# Patient Record
Sex: FEMALE | Race: BLACK OR AFRICAN AMERICAN | NOT HISPANIC OR LATINO | Employment: FULL TIME | URBAN - METROPOLITAN AREA
[De-identification: names, ages, dates, MRNs, and addresses within clinical notes are randomized per-mention and may not be internally consistent; named-entity substitution may affect disease eponyms.]

---

## 2019-01-03 ENCOUNTER — HOSPITAL ENCOUNTER (OUTPATIENT)
Dept: RADIOLOGY | Facility: HOSPITAL | Age: 49
Discharge: HOME | End: 2019-01-03
Attending: NURSE PRACTITIONER
Payer: COMMERCIAL

## 2019-01-03 ENCOUNTER — OFFICE VISIT (OUTPATIENT)
Dept: URGENT CARE | Facility: CLINIC | Age: 49
End: 2019-01-03
Payer: COMMERCIAL

## 2019-01-03 VITALS
WEIGHT: 174.81 LBS | SYSTOLIC BLOOD PRESSURE: 114 MMHG | DIASTOLIC BLOOD PRESSURE: 84 MMHG | TEMPERATURE: 97 F | OXYGEN SATURATION: 100 % | HEART RATE: 64 BPM

## 2019-01-03 DIAGNOSIS — R10.84 GENERALIZED ABDOMINAL PAIN: ICD-10-CM

## 2019-01-03 DIAGNOSIS — R10.9 ABDOMINAL PAIN, UNSPECIFIED ABDOMINAL LOCATION: ICD-10-CM

## 2019-01-03 DIAGNOSIS — R10.9 ABDOMINAL PAIN, UNSPECIFIED ABDOMINAL LOCATION: Primary | ICD-10-CM

## 2019-01-03 LAB
BILIRUB SERPL-MCNC: NEGATIVE MG/DL
BLOOD URINE, POC: NEGATIVE
COLOR, POC UA: ABNORMAL
GLUCOSE UR QL STRIP: NORMAL
KETONES UR QL STRIP: NEGATIVE
LEUKOCYTE ESTERASE URINE, POC: NEGATIVE
NITRITE, POC UA: NEGATIVE
PH, POC UA: 7
PROTEIN, POC: ABNORMAL
SPECIFIC GRAVITY, POC UA: 1
UROBILINOGEN, POC UA: NORMAL

## 2019-01-03 PROCEDURE — 99999 PR PBB SHADOW E&M-NEW PATIENT-LVL V: ICD-10-PCS | Mod: PBBFAC,,, | Performed by: NURSE PRACTITIONER

## 2019-01-03 PROCEDURE — 74019 RADEX ABDOMEN 2 VIEWS: CPT | Mod: TC,FY,PO

## 2019-01-03 PROCEDURE — 81001 POCT URINALYSIS, DIPSTICK OR TABLET REAGENT, AUTOMATED, WITH MICROSCOP: ICD-10-PCS | Mod: S$GLB,,, | Performed by: NURSE PRACTITIONER

## 2019-01-03 PROCEDURE — 74019 RADEX ABDOMEN 2 VIEWS: CPT | Mod: 26,,, | Performed by: RADIOLOGY

## 2019-01-03 PROCEDURE — 99204 OFFICE O/P NEW MOD 45 MIN: CPT | Mod: 25,S$GLB,, | Performed by: NURSE PRACTITIONER

## 2019-01-03 PROCEDURE — 81001 URINALYSIS AUTO W/SCOPE: CPT | Mod: S$GLB,,, | Performed by: NURSE PRACTITIONER

## 2019-01-03 PROCEDURE — 99999 PR PBB SHADOW E&M-NEW PATIENT-LVL V: CPT | Mod: PBBFAC,,, | Performed by: NURSE PRACTITIONER

## 2019-01-03 PROCEDURE — 87086 URINE CULTURE/COLONY COUNT: CPT

## 2019-01-03 PROCEDURE — 74019 XR ABDOMEN FLAT AND ERECT: ICD-10-PCS | Mod: 26,,, | Performed by: RADIOLOGY

## 2019-01-03 PROCEDURE — 99204 PR OFFICE/OUTPT VISIT, NEW, LEVL IV, 45-59 MIN: ICD-10-PCS | Mod: 25,S$GLB,, | Performed by: NURSE PRACTITIONER

## 2019-01-03 RX ORDER — OXYCODONE AND ACETAMINOPHEN 7.5; 325 MG/1; MG/1
TABLET ORAL
COMMUNITY
Start: 2015-05-09 | End: 2023-07-13 | Stop reason: SDUPTHER

## 2019-01-03 RX ORDER — TRIAMCINOLONE ACETONIDE 1 MG/G
OINTMENT TOPICAL
COMMUNITY
Start: 2017-06-09

## 2019-01-03 RX ORDER — KETOCONAZOLE 20 MG/G
CREAM TOPICAL
COMMUNITY
Start: 2017-06-09

## 2019-01-03 RX ORDER — FLUTICASONE PROPIONATE 50 MCG
SPRAY, SUSPENSION (ML) NASAL
COMMUNITY

## 2019-01-03 RX ORDER — ASPIRIN 81 MG/1
81 TABLET ORAL
COMMUNITY

## 2019-01-03 RX ORDER — PROPRANOLOL HYDROCHLORIDE 20 MG/1
TABLET ORAL
COMMUNITY

## 2019-01-03 RX ORDER — CYCLOBENZAPRINE HCL 10 MG
TABLET ORAL
COMMUNITY
Start: 2015-04-16 | End: 2023-07-13 | Stop reason: SDUPTHER

## 2019-01-03 NOTE — PROGRESS NOTES
Subjective:      Patient ID: Altagracia Islas is a 48 y.o. female.    Chief Complaint: Flank Pain      Flank Pain   This is a new problem. The current episode started 1 to 4 weeks ago. The problem occurs constantly. The problem has been gradually worsening since onset. The quality of the pain is described as aching (dull consistent). The pain does not radiate. The pain is at a severity of 7/10. The pain is the same all the time. The symptoms are aggravated by bending, sitting and urinating (bowel movements). Associated symptoms include abdominal pain (LLQ). Pertinent negatives include no bladder incontinence, bowel incontinence, chest pain, dysuria, fever, headaches, leg pain, paresis, paresthesias, pelvic pain, perianal numbness, tingling, weakness or weight loss. Risk factors include obesity. She has tried NSAIDs for the symptoms. The treatment provided mild relief.     Review of Systems   Constitutional: Negative.  Negative for activity change, appetite change, chills, diaphoresis, fatigue, fever and weight loss.   HENT: Negative.    Eyes: Negative.    Respiratory: Negative.  Negative for chest tightness and shortness of breath.    Cardiovascular: Negative.  Negative for chest pain and palpitations.   Gastrointestinal: Positive for abdominal pain (LLQ). Negative for abdominal distention, anal bleeding, blood in stool, bowel incontinence, constipation, diarrhea, nausea, rectal pain and vomiting.   Endocrine: Negative.  Negative for cold intolerance and heat intolerance.   Genitourinary: Negative for bladder incontinence, difficulty urinating, dyspareunia, dysuria, flank pain and pelvic pain.   Musculoskeletal: Negative for myalgias.   Skin: Negative for rash.   Allergic/Immunologic: Negative.  Negative for environmental allergies.   Neurological: Negative.  Negative for dizziness, tingling, weakness, light-headedness, headaches and paresthesias.   Hematological: Negative.  Negative for adenopathy.    Psychiatric/Behavioral: Negative.  Negative for agitation.        Objective:     Vitals:    01/03/19 1257   BP: 114/84   Pulse: 64   Temp: 96.9 °F (36.1 °C)     Physical Exam   Constitutional: She is oriented to person, place, and time. Vital signs are normal. She appears well-developed and well-nourished. She is cooperative. She does not appear ill. No distress.   HENT:   Head: Normocephalic.   Right Ear: Hearing and external ear normal.   Left Ear: Hearing and external ear normal.   Nose: Nose normal.   Eyes: Conjunctivae and EOM are normal. Pupils are equal, round, and reactive to light. Right eye exhibits no discharge. Left eye exhibits no discharge.   Neck: Normal range of motion and full passive range of motion without pain. Neck supple.   Cardiovascular: Normal rate, regular rhythm, S1 normal, S2 normal and normal heart sounds.   Pulmonary/Chest: Effort normal and breath sounds normal. No accessory muscle usage. No tachypnea and no bradypnea. No respiratory distress. She exhibits no tenderness.   Abdominal: Soft. Normal appearance and bowel sounds are normal. She exhibits no shifting dullness, no distension, no pulsatile liver, no fluid wave, no abdominal bruit, no ascites, no pulsatile midline mass and no mass. There is no hepatosplenomegaly. There is tenderness in the left lower quadrant. There is no rigidity, no rebound, no guarding, no CVA tenderness, no tenderness at McBurney's point and negative Pantoja's sign.   Genitourinary:   Genitourinary Comments: Abnormal findings In House Urinalysis: Trace Protein   Musculoskeletal: Normal range of motion.   Neurological: She is alert and oriented to person, place, and time.   Skin: Skin is warm, dry and intact. Capillary refill takes less than 2 seconds. No bruising, no ecchymosis, no lesion, no petechiae and no rash noted. No erythema.   Nursing note and vitals reviewed.      Assessment:     1. Abdominal pain, unspecified abdominal location    2. Generalized  abdominal pain        Plan:       Altagracia was seen today for flank pain.    Diagnoses and all orders for this visit:    Abdominal pain, unspecified abdominal location  -     POCT URINE DIPSTICK WITH MICROSCOPE, AUTOMATED  -     X-Ray Abdomen Flat And Erect; Future  -     Urine culture  -     CBC auto differential; Future  -     Sedimentation rate; Future  -     C-reactive protein; Future  -     X-Ray Abdomen Flat And Erect; Future    Generalized abdominal pain  -     CT Abdomen Without Contrast; Future  -     Urine culture  -     CBC auto differential; Future  -     Sedimentation rate; Future  -     C-reactive protein; Future  -     X-Ray Abdomen Flat And Erect; Future      If your condition worsens or fails to improve we recommend that you receive another evaluation at the ER immediately or contact your PCP to discuss your concerns or return here. You must understand that you've received an urgent care treatment only and that you may be released before all your medical problems are known or treated. You the patient will arrange for followup care as instructed.   01/10/19-Spoke with patient by phone regarding CT results, noting 3.8 cm enlargement of left ovary per radiology report, who reports understanding and reported will follow up with GYN Dr. CATARINA Abernathy, APRN, FNP-C

## 2019-01-03 NOTE — PATIENT INSTRUCTIONS
Abdominal Pain    Abdominal pain is pain in the stomach or belly area. Everyone has this pain from time to time. In many cases it goes away on its own. But abdominal pain can sometimes be due to a serious problem, such as appendicitis. So its important to know when to seek help.  Causes of abdominal pain  There are many possible causes of abdominal pain. Common causes in adults include:  · Constipation, diarrhea, or gas  · Stomach acid flowing back up into the esophagus (acid reflux or heartburn)  · Severe acid reflux, called GERD (gastroesophageal reflux disease)  · A sore in the lining of the stomach or small intestine (peptic ulcer)  · Inflammation of the gallbladder, liver, or pancreas  · Gallstones or kidney stones  · Appendicitis   · Intestinal blockage   · An internal organ pushing through a muscle or other tissue (hernia)  · Urinary tract infections  · In women, menstrual cramps, fibroids, or endometriosis  · Inflammation or infection of the intestines  Diagnosing the cause of abdominal pain  Your healthcare provider will do a physical exam help find the cause of your pain. If needed, tests will be ordered. Belly pain has many possible causes. So it can be hard to find the reason for your pain. Giving details about your pain can help. Tell your provider where and when you feel the pain, and what makes it better or worse. Also let your provider know if you have other symptoms such as:  · Fever  · Tiredness  · Upset stomach (nausea)  · Vomiting  · Changes in bathroom habits  Treating abdominal pain  Some causes of pain need emergency medical treatment right away. These include appendicitis or a bowel blockage. Other problems can be treated with rest, fluids, or medicines. Your healthcare provider can give you specific instructions for treatment or self-care based on what is causing your pain.  If you have vomiting or diarrhea, sip water or other clear fluids. When you are ready to eat solid foods again,  start with small amounts of easy-to-digest, low-fat foods. These include apple sauce, toast, or crackers.   When to seek medical care  Call 911 or go to the hospital right away if you:  · Cant pass stool and are vomiting  · Are vomiting blood or have bloody diarrhea or black, tarry diarrhea  · Have chest, neck, or shoulder pain  · Feel like you might pass out  · Have pain in your shoulder blades with nausea  · Have sudden, severe belly pain  · Have new, severe pain unlike any you have felt before  · Have a belly that is rigid, hard, and tender to touch  Call your healthcare provider if you have:  · Pain for more than 5 days  · Bloating for more than 2 days  · Diarrhea for more than 5 days  · A fever of 100.4°F (38.0°C) or higher, or as directed by your provider  · Pain that gets worse  · Weight loss for no reason  · Continued lack of appetite  · Blood in your stool  How to prevent abdominal pain  Here are some tips to help prevent abdominal pain:  · Eat smaller amounts of food at one time.  · Avoid greasy, fried, or other high-fat foods.  · Avoid foods that give you gas.  · Exercise regularly.  · Drink plenty of fluids.  To help prevent GERD symptoms:  · Quit smoking.  · Reduce alcohol and certain foods that increase stomach acid.  · Avoid aspirin and over-the-counter pain and fever medicines (NSAIDS or nonsteroidal anti-inflammatory drugs), if possible  · Lose extra weight.  · Finish eating at least 2 hours before you go to bed or lie down.  · Raise the head of your bed.  Date Last Reviewed: 7/1/2016  © 8714-0330 [employer]. [address] [place] [address], [place] [address]. All rights reserved. This information is not intended as a substitute for professional medical care. Always follow your healthcare professional's instructions.        Unknown Causes of Abdominal Pain (Female)    The exact cause of your abdominal (stomach) pain is not clear. This does not mean that this is something to worry about.  Everyone likes to know the exact cause of the problem, but sometimes with abdominal pain, there is no clear-cut cause, and this could be a good thing. The good news is that your symptoms can be treated, and you will feel better.   Your condition does not seem serious now; however, sometimes the signs of a serious problem may take more time to appear. For this reason, it is important for you to watch for any new symptoms, problems, or worsening of your condition.  Over the next few days, the abdominal pain may come and go, or be continuous. Other common symptoms can include nausea and vomiting. Sometimes it can be difficult to tell if you feel nauseous, you may just feel bad and not associate that feeling with nausea. Constipation, diarrhea, and a fever may go along with the pain.  The pain may continue even if treated correctly over the following days. Depending on how things go, sometimes the cause can become clear and may require further or different treatment. Additional evaluations, medications, or tests may also be needed.  Home care  Your healthcare provider may prescribe medicine for pain, symptoms, or an infection.  Follow the healthcare provider's instructions for taking these medicines.  General care  · Rest as much as you can until your next exam. No strenuous activities.  · Try to find positions that ease discomfort. A small pillow placed on the abdomen may help relieve pain.  · Something warm on your abdomen (such as a heating pad) may help, but be careful not to burn yourself.  Diet  · Do not force yourself to eat, especially if having cramps, vomiting, or diarrhea.  · Water is important so you do not get dehydrated. Soup may also be good. Sports drinks may also help, especially if they are not too acidic. Make sure you don't drink sugary drinks as this can make things worse. Take liquids in small amounts. Do not guzzle them.  · Caffeine sometimes makes the pain and cramping worse.  · Avoid dairy  products if you have vomiting or diarrhea.  · Don't eat large amounts at a time. Wait a few minutes between bites.  · Eat a diet low in fiber (called a low-residue diet). Foods allowed include refined breads, white rice, fruit and vegetable juices without pulp, tender meats. These foods will pass more easily through the intestine.  · Avoid whole-grain foods, whole fruits and vegetables, meats, seeds and nuts, fried or fatty foods, dairy, alcohol and spicy foods until your symptoms go away.  Follow-up care  Follow up with your healthcare provider, or as advised, if your pain does not begin to improve in the next 24 hours.  Call 911  Call 911 if any of these occur:  · Trouble breathing  · Confusion  · Fainting or loss of consciousness  · Rapid heart rate  · Seizure  When to seek medical advice  Call your healthcare provider right away if any of these occur:  · Pain gets worse or moves to the right lower abdomen  · New or worsening vomiting or diarrhea  · Swelling of the abdomen  · Unable to pass stool for more than 3 days  · Fever of 100.4ºF (38ºC) or higher, or as directed by your healthcare provider.  · Blood in vomit or bowel movements (dark red or black color)  · Jaundice (yellow color of eyes and skin)  · Weakness, dizziness  · Chest, arm, back, neck or jaw pain  · Unexpected vaginal bleeding or missed period  · Can't keep down liquids or water and are getting dehydrated  Date Last Reviewed: 12/30/2015  © 6417-8277 [employer]. [address] [place] [address], [place] [address]. All rights reserved. This information is not intended as a substitute for professional medical care. Always follow your healthcare professional's instructions.

## 2019-01-05 LAB — BACTERIA UR CULT: NORMAL

## 2019-01-06 ENCOUNTER — TELEPHONE (OUTPATIENT)
Dept: URGENT CARE | Facility: CLINIC | Age: 49
End: 2019-01-06

## 2019-01-06 NOTE — TELEPHONE ENCOUNTER
Spoke with client by phone after 2 patient identifiers, regarding abdominal x-ray results and labs from previous visit, who reports understanding.  Also reports feeling better and has appointment on Tuesday at 11:10am at [facility] for more Radiology test.  Advised to follow up with PCP for further evaluation regarding abnormal lab/x-ray results, who reports understanding

## 2019-01-07 ENCOUNTER — TELEPHONE (OUTPATIENT)
Dept: RADIOLOGY | Facility: HOSPITAL | Age: 49
End: 2019-01-07

## 2019-01-08 ENCOUNTER — HOSPITAL ENCOUNTER (OUTPATIENT)
Dept: RADIOLOGY | Facility: HOSPITAL | Age: 49
Discharge: HOME | End: 2019-01-08
Attending: NURSE PRACTITIONER
Payer: COMMERCIAL

## 2019-01-08 DIAGNOSIS — R10.84 GENERALIZED ABDOMINAL PAIN: ICD-10-CM

## 2019-01-08 PROCEDURE — 25500020 PHARM REV CODE 255: Mod: PO | Performed by: NURSE PRACTITIONER

## 2019-01-08 PROCEDURE — 74177 CT ABDOMEN PELVIS WITH CONTRAST: ICD-10-PCS | Mod: 26,,, | Performed by: RADIOLOGY

## 2019-01-08 PROCEDURE — 74177 CT ABD & PELVIS W/CONTRAST: CPT | Mod: TC,PO

## 2019-01-08 PROCEDURE — 74177 CT ABD & PELVIS W/CONTRAST: CPT | Mod: 26,,, | Performed by: RADIOLOGY

## 2019-01-08 RX ADMIN — IOHEXOL 75 ML: 350 INJECTION, SOLUTION INTRAVENOUS at 12:01

## 2019-01-08 RX ADMIN — IOHEXOL 16 ML: 350 INJECTION, SOLUTION INTRAVENOUS at 10:01

## 2019-12-13 ENCOUNTER — OFFICE VISIT (OUTPATIENT)
Dept: URGENT CARE | Facility: CLINIC | Age: 49
End: 2019-12-13
Payer: COMMERCIAL

## 2019-12-13 VITALS
HEART RATE: 76 BPM | SYSTOLIC BLOOD PRESSURE: 153 MMHG | DIASTOLIC BLOOD PRESSURE: 81 MMHG | TEMPERATURE: 98 F | OXYGEN SATURATION: 99 %

## 2019-12-13 DIAGNOSIS — J32.9 BACTERIAL SINUSITIS: Primary | ICD-10-CM

## 2019-12-13 DIAGNOSIS — R05.9 COUGH: ICD-10-CM

## 2019-12-13 DIAGNOSIS — B96.89 BACTERIAL SINUSITIS: Primary | ICD-10-CM

## 2019-12-13 LAB
CTP QC/QA: YES
FLUAV AG NPH QL: NEGATIVE
FLUBV AG NPH QL: NEGATIVE

## 2019-12-13 PROCEDURE — 96372 THER/PROPH/DIAG INJ SC/IM: CPT | Mod: S$GLB,,, | Performed by: NURSE PRACTITIONER

## 2019-12-13 PROCEDURE — 87804 INFLUENZA ASSAY W/OPTIC: CPT | Mod: QW,S$GLB,, | Performed by: NURSE PRACTITIONER

## 2019-12-13 PROCEDURE — 87804 POCT INFLUENZA A/B: ICD-10-PCS | Mod: QW,S$GLB,, | Performed by: NURSE PRACTITIONER

## 2019-12-13 PROCEDURE — 99214 OFFICE O/P EST MOD 30 MIN: CPT | Mod: 25,S$GLB,, | Performed by: NURSE PRACTITIONER

## 2019-12-13 PROCEDURE — 96372 PR INJECTION,THERAP/PROPH/DIAG2ST, IM OR SUBCUT: ICD-10-PCS | Mod: S$GLB,,, | Performed by: NURSE PRACTITIONER

## 2019-12-13 PROCEDURE — 99214 PR OFFICE/OUTPT VISIT, EST, LEVL IV, 30-39 MIN: ICD-10-PCS | Mod: 25,S$GLB,, | Performed by: NURSE PRACTITIONER

## 2019-12-13 RX ORDER — BETAMETHASONE SODIUM PHOSPHATE AND BETAMETHASONE ACETATE 3; 3 MG/ML; MG/ML
6 INJECTION, SUSPENSION INTRA-ARTICULAR; INTRALESIONAL; INTRAMUSCULAR; SOFT TISSUE
Status: COMPLETED | OUTPATIENT
Start: 2019-12-13 | End: 2019-12-13

## 2019-12-13 RX ORDER — LORATADINE 10 MG/1
10 TABLET ORAL DAILY
Qty: 30 TABLET | Refills: 0 | Status: SHIPPED | OUTPATIENT
Start: 2019-12-13 | End: 2020-09-04

## 2019-12-13 RX ORDER — AZITHROMYCIN 250 MG/1
TABLET, FILM COATED ORAL
Qty: 6 TABLET | Refills: 0 | Status: SHIPPED | OUTPATIENT
Start: 2019-12-13 | End: 2019-12-18

## 2019-12-13 RX ORDER — FLUTICASONE PROPIONATE 50 MCG
1 SPRAY, SUSPENSION (ML) NASAL DAILY
Qty: 9.9 ML | Refills: 0 | Status: SHIPPED | OUTPATIENT
Start: 2019-12-13 | End: 2019-12-23

## 2019-12-13 RX ORDER — GUAIFENESIN 600 MG/1
600 TABLET, EXTENDED RELEASE ORAL 2 TIMES DAILY PRN
Qty: 14 TABLET | Refills: 0 | Status: SHIPPED | OUTPATIENT
Start: 2019-12-13 | End: 2019-12-20

## 2019-12-13 RX ADMIN — BETAMETHASONE SODIUM PHOSPHATE AND BETAMETHASONE ACETATE 6 MG: 3; 3 INJECTION, SUSPENSION INTRA-ARTICULAR; INTRALESIONAL; INTRAMUSCULAR; SOFT TISSUE at 03:12

## 2019-12-13 NOTE — PATIENT INSTRUCTIONS
Sinusitis (Antibiotic Treatment)    The sinuses are air-filled spaces within the bones of the face. They connect to the inside of the nose. Sinusitis is an inflammation of the tissue lining the sinus cavity. Sinus inflammation can occur during a cold. It can also be due to allergies to pollens and other particles in the air. Sinusitis can cause symptoms of sinus congestion and fullness. A sinus infection causes fever, headache and facial pain. There is often green or yellow drainage from the nose or into the back of the throat (post-nasal drip). You have been given antibiotics to treat this condition.  Home care:  · Take the full course of antibiotics as instructed. Do not stop taking them, even if you feel better.  · Drink plenty of water, hot tea, and other liquids. This may help thin mucus. It also may promote sinus drainage.  · Heat may help soothe painful areas of the face. Use a towel soaked in hot water. Or,  the shower and direct the hot spray onto your face. Using a vaporizer along with a menthol rub at night may also help.   · An expectorant containing guaifenesin may help thin the mucus and promote drainage from the sinuses.  · Over-the-counter decongestants may be used unless a similar medicine was prescribed. Nasal sprays work the fastest. Use one that contains phenylephrine or oxymetazoline. First blow the nose gently. Then use the spray. Do not use these medicines more often than directed on the label or symptoms may get worse. You may also use tablets containing pseudoephedrine. Avoid products that combine ingredients, because side effects may be increased. Read labels. You can also ask the pharmacist for help. (NOTE: Persons with high blood pressure should not use decongestants. They can raise blood pressure.)  · Over-the-counter antihistamines may help if allergies contributed to your sinusitis.    · Do not use nasal rinses or irrigation during an acute sinus infection, unless told to by  your health care provider. Rinsing may spread the infection to other sinuses.  · Use acetaminophen or ibuprofen to control pain, unless another pain medicine was prescribed. (If you have chronic liver or kidney disease or ever had a stomach ulcer, talk with your doctor before using these medicines. Aspirin should never be used in anyone under 18 years of age who is ill with a fever. It may cause severe liver damage.)  · Don't smoke. This can worsen symptoms.  Follow-up care  Follow up with your healthcare provider or our staff if you are not improving within the next week.  When to seek medical advice  Call your healthcare provider if any of these occur:  · Facial pain or headache becoming more severe  · Stiff neck  · Unusual drowsiness or confusion  · Swelling of the forehead or eyelids  · Vision problems, including blurred or double vision  · Fever of 100.4ºF (38ºC) or higher, or as directed by your healthcare provider  · Seizure  · Breathing problems  · Symptoms not resolving within 10 days  Date Last Reviewed: 4/13/2015  © 5327-0605 [employer]. [address] [place] [address], [place] [address]. All rights reserved. This information is not intended as a substitute for professional medical care. Always follow your healthcare professional's instructions.

## 2019-12-13 NOTE — PROGRESS NOTES
Subjective:       Patient ID: Altagracia Islas is a 49 y.o. female.    Vitals:  oral temperature is 98.1 °F (36.7 °C). Her blood pressure is 153/81 (abnormal) and her pulse is 76. Her oxygen saturation is 99%.     Chief Complaint: URI    URI x2 days// DX of strep last week   Was treated for that/// No OTC meds attempted    URI    This is a new problem. The current episode started yesterday. The problem has been gradually worsening. There has been no fever. Associated symptoms include congestion, coughing and sinus pain. Pertinent negatives include no ear pain, nausea, rash, vomiting or wheezing. The treatment provided no relief.       Constitution: Positive for chills, sweating, fatigue and fever.   HENT: Positive for congestion, sinus pain and sinus pressure. Negative for ear pain and voice change.    Neck: Negative for painful lymph nodes.   Eyes: Negative for eye redness.   Respiratory: Positive for cough. Negative for chest tightness, sputum production, bloody sputum, COPD, shortness of breath, stridor, wheezing and asthma.    Gastrointestinal: Negative for nausea and vomiting.   Musculoskeletal: Positive for muscle ache.   Skin: Negative for rash.   Allergic/Immunologic: Negative for seasonal allergies and asthma.   Hematologic/Lymphatic: Negative for swollen lymph nodes.       Objective:      Physical Exam   Constitutional: She is oriented to person, place, and time. She appears well-developed and well-nourished. She is cooperative.  Non-toxic appearance. She does not have a sickly appearance. She does not appear ill. No distress.   HENT:   Head: Normocephalic and atraumatic.   Right Ear: Hearing, external ear and ear canal normal. A middle ear effusion is present.   Left Ear: Hearing, external ear and ear canal normal. A middle ear effusion is present.   Nose: Mucosal edema, rhinorrhea and purulent discharge present. No nasal deformity. No epistaxis. Right sinus exhibits maxillary sinus tenderness. Right sinus  exhibits no frontal sinus tenderness. Left sinus exhibits maxillary sinus tenderness. Left sinus exhibits no frontal sinus tenderness.   Mouth/Throat: Uvula is midline and mucous membranes are normal. No trismus in the jaw. Normal dentition. No uvula swelling. Posterior oropharyngeal erythema present. No oropharyngeal exudate or posterior oropharyngeal edema.   Eyes: Conjunctivae and lids are normal. No scleral icterus.   Neck: Trachea normal, full passive range of motion without pain and phonation normal. Neck supple. No neck rigidity. No edema and no erythema present.   Cardiovascular: Normal rate, regular rhythm, normal heart sounds, intact distal pulses and normal pulses.   Pulmonary/Chest: Effort normal and breath sounds normal. No respiratory distress. She has no decreased breath sounds. She has no rhonchi.   Abdominal: Normal appearance.   Musculoskeletal: Normal range of motion. She exhibits no edema or deformity.   Lymphadenopathy:        Head (right side): No submandibular and no tonsillar adenopathy present.        Head (left side): No submandibular and no tonsillar adenopathy present.     She has no cervical adenopathy.   Neurological: She is alert and oriented to person, place, and time. She exhibits normal muscle tone. Coordination normal.   Skin: Skin is warm, dry, intact, not diaphoretic, not pale and no rash. Capillary refill takes less than 2 seconds.   Psychiatric: She has a normal mood and affect. Her speech is normal and behavior is normal. Judgment and thought content normal. Cognition and memory are normal.   Nursing note and vitals reviewed.        Assessment:       1. Bacterial sinusitis    2. Cough        Plan:         Bacterial sinusitis  -     azithromycin (Z-VIET) 250 MG tablet; Take 2 tablets by mouth on day 1; Take 1 tablet by mouth on days 2-5  Dispense: 6 tablet; Refill: 0  -     guaiFENesin (MUCINEX) 600 mg 12 hr tablet; Take 1 tablet (600 mg total) by mouth 2 (two) times daily as  needed.  Dispense: 14 tablet; Refill: 0  -     loratadine (CLARITIN) 10 mg tablet; Take 1 tablet (10 mg total) by mouth once daily.  Dispense: 30 tablet; Refill: 0  -     fluticasone propionate (FLONASE) 50 mcg/actuation nasal spray; 1 spray (50 mcg total) by Each Nostril route once daily. for 10 days  Dispense: 9.9 mL; Refill: 0  -     betamethasone acetate-betamethasone sodium phosphate injection 6 mg    Cough  -     POCT Influenza A/B  -     guaiFENesin (MUCINEX) 600 mg 12 hr tablet; Take 1 tablet (600 mg total) by mouth 2 (two) times daily as needed.  Dispense: 14 tablet; Refill: 0         Results for orders placed or performed in visit on 12/13/19   POCT Influenza A/B   Result Value Ref Range    Rapid Influenza A Ag Negative Negative    Rapid Influenza B Ag Negative Negative     Acceptable Yes      Take antibiotics for entire course.  Do not save medications for later, all medications must be taken for full regimen.   · Getting plenty of rest is very important to fighting infections.  · Increase fluids.   · May apply warm compresses as needed for facial pain and congestion.   · Saline nasal spray to loosen nasal congestion.  · Flonase or Nasacort to reduce inflammation in the sinus cavities.  · You may take an over the counter antihistamine for allergy symptoms such as sneezing, itchy/watery eyes, scratchy throat, or congestion.  · Take Tylenol or Ibuprofen as needed for sore throat, body aches, or fever.  · Don't drive, drink alcohol, or take any other medication or substance that causes sedation while taking cough syrup.   · Follow up with your primary care provider if symptoms persist >10 days or sooner for any new or worsening symptoms.   Go to the ER for any fever that does not improve with Tylenol/Ibuprofen, neck stiffness, rash, severe headache, vision changes, shortness of breath, chest pain, facial swelling, severe facial pain, or any other new and concerning symptoms.

## 2020-09-01 ENCOUNTER — OFFICE VISIT (OUTPATIENT)
Dept: OTOLARYNGOLOGY | Facility: CLINIC | Age: 50
End: 2020-09-01
Payer: COMMERCIAL

## 2020-09-01 ENCOUNTER — LAB VISIT (OUTPATIENT)
Dept: OTOLARYNGOLOGY | Facility: CLINIC | Age: 50
End: 2020-09-01
Payer: COMMERCIAL

## 2020-09-01 ENCOUNTER — HOSPITAL ENCOUNTER (OUTPATIENT)
Dept: RADIOLOGY | Facility: HOSPITAL | Age: 50
Discharge: HOME | End: 2020-09-01
Attending: OTOLARYNGOLOGY
Payer: COMMERCIAL

## 2020-09-01 VITALS
HEART RATE: 75 BPM | SYSTOLIC BLOOD PRESSURE: 138 MMHG | TEMPERATURE: 99 F | BODY MASS INDEX: 32.1 KG/M2 | DIASTOLIC BLOOD PRESSURE: 97 MMHG | WEIGHT: 170 LBS | HEIGHT: 61 IN

## 2020-09-01 DIAGNOSIS — Z01.818 PRE-PROCEDURAL EXAMINATION: ICD-10-CM

## 2020-09-01 DIAGNOSIS — R22.1 LOCALIZED SWELLING, MASS AND LUMP, NECK: ICD-10-CM

## 2020-09-01 DIAGNOSIS — Z01.818 PRE-PROCEDURAL EXAMINATION: Primary | ICD-10-CM

## 2020-09-01 DIAGNOSIS — R49.0 HOARSENESS: ICD-10-CM

## 2020-09-01 PROCEDURE — 3008F BODY MASS INDEX DOCD: CPT | Mod: CPTII,S$GLB,, | Performed by: OTOLARYNGOLOGY

## 2020-09-01 PROCEDURE — 71130 XR STERNOCLAVICULAR JOINTS MIN 3 VIEW: ICD-10-PCS | Mod: 26,,, | Performed by: RADIOLOGY

## 2020-09-01 PROCEDURE — U0003 INFECTIOUS AGENT DETECTION BY NUCLEIC ACID (DNA OR RNA); SEVERE ACUTE RESPIRATORY SYNDROME CORONAVIRUS 2 (SARS-COV-2) (CORONAVIRUS DISEASE [COVID-19]), AMPLIFIED PROBE TECHNIQUE, MAKING USE OF HIGH THROUGHPUT TECHNOLOGIES AS DESCRIBED BY CMS-2020-01-R: HCPCS

## 2020-09-01 PROCEDURE — 71130 X-RAY STRENOCLAVIC JT 3/>VWS: CPT | Mod: 26,,, | Performed by: RADIOLOGY

## 2020-09-01 PROCEDURE — 3008F PR BODY MASS INDEX (BMI) DOCUMENTED: ICD-10-PCS | Mod: CPTII,S$GLB,, | Performed by: OTOLARYNGOLOGY

## 2020-09-01 PROCEDURE — 99203 PR OFFICE/OUTPT VISIT, NEW, LEVL III, 30-44 MIN: ICD-10-PCS | Mod: S$GLB,,, | Performed by: OTOLARYNGOLOGY

## 2020-09-01 PROCEDURE — 73000 X-RAY EXAM OF COLLAR BONE: CPT | Mod: TC,50

## 2020-09-01 PROCEDURE — 71130 X-RAY STRENOCLAVIC JT 3/>VWS: CPT | Mod: TC

## 2020-09-01 PROCEDURE — 99203 OFFICE O/P NEW LOW 30 MIN: CPT | Mod: S$GLB,,, | Performed by: OTOLARYNGOLOGY

## 2020-09-01 PROCEDURE — 73000 X-RAY EXAM OF COLLAR BONE: CPT | Mod: 26,,, | Performed by: RADIOLOGY

## 2020-09-01 PROCEDURE — 99999 PR PBB SHADOW E&M-EST. PATIENT-LVL III: ICD-10-PCS | Mod: PBBFAC,,, | Performed by: OTOLARYNGOLOGY

## 2020-09-01 PROCEDURE — 73000 XR CLAVICLE BILATERAL: ICD-10-PCS | Mod: 26,,, | Performed by: RADIOLOGY

## 2020-09-01 PROCEDURE — 99999 PR PBB SHADOW E&M-EST. PATIENT-LVL III: CPT | Mod: PBBFAC,,, | Performed by: OTOLARYNGOLOGY

## 2020-09-01 NOTE — PROGRESS NOTES
"  Referring Provider:    Esperanza Self  No address on file  Subjective:   Patient: Altagracia Islas [id number], :1970   Visit date:2020 12:55 PM    Chief Complaint:  Other (lump in throat that has been there for the last several years. She stated that she had it checked out in the past and it wasnt anything but it has gotten worse and has just now caused her hoarsness over the last few weeks. )    HPI:  Altagracia is a 50 y.o. female who I was asked to see in consultation for evaluation of the following issue(s):     Anterior neck mass/lump.  Present for several years. Now somewhat enlarged  Mild discomfort.  No smoking hx  Fluctuating hoarseness for months  No GERD or allergy affirmed.    Review of Systems:  -     Allergic/Immunologic: has No Known Allergies..  -     Constitutional: Current temp: 98.8 °F (37.1 °C) (Temporal)      Her meds, allergies, medical, surgical, social & family histories were reviewed & updated:  -     She has a current medication list which includes the following prescription(s): aspirin, cyclobenzaprine, fluticasone propionate, ketoconazole, oxycodone-acetaminophen, propranolol, triamcinolone acetonide 0.1%, and loratadine.  -     She  has a past medical history of Heart attack, heart artery stent, Migraine, and Skin abnormalities.   -     She does not have a problem list on file.   -     She  has a past surgical history that includes Hysterectomy;  section; and Breast surgery.  -     She  reports that she has never smoked. She has never used smokeless tobacco. She reports that she does not drink alcohol or use drugs.  -     Her family history is not on file.  -     She has No Known Allergies.    Objective:     Physical Exam:  Vitals:  BP (!) 138/97   Pulse 75   Temp 98.8 °F (37.1 °C) (Temporal)   Ht 5' 1" (1.549 m)   Wt 77.1 kg (170 lb)   BMI 32.12 kg/m²   General appearance:  Well developed, well nourished    Eyes:  Extraocular motions intact, " PERRL    Communication:  Mildly raspy voice    Ears:  Otoscopy of external auditory canals and tympanic membranes was normal, clinical speech reception thresholds grossly intact, no mass/lesion of auricle.  Nose:  No masses/lesions of external nose, nasal mucosa, septum, and turbinates were within normal limits.  Mouth:  No mass/lesion of lips, teeth, gums, hard/soft palate, tongue, tonsils, or oropharynx.    Cardiovascular:  No pedal edema; Radial Pulses +2     Neck & Lymphatics:  No cervical lymphadenopathy, no neck mass/crepitus/ asymmetry, trachea is midline, no thyroid enlargement/tenderness/mass.  Near sternoclavicular joint on the left, there is significant fullness.  Difficult to determine if this is a firm mass behind the joint or the joint itself.    Psych: Oriented x3,  Alert with normal mood and affect.     Respiration/Chest:  Symmetric expansion during respiration, normal respiratory effort.    Skin:  Warm and intact. No ulcerations of face, scalp, neck.      Assessment & Plan:   Pre-procedural examination  -     COVID-19 Routine Screening; Future; Expected date: 09/01/2020    Localized swelling, mass and lump, neck  -     US Soft Tissue Head Neck Thyroid; Future; Expected date: 09/01/2020  -     X-Ray Sternoclavicular Joints Min 3 View; Future; Expected date: 09/01/2020  -     X-Ray Clavicle Bilateral; Future; Expected date: 09/01/2020    Hoarseness    RTC to review results and for fiberoptic laryngoscopy        Thank you for allowing me to participate in the care of Altagracia.       Brandin Hayden MD, FACS  [facility]   [facility]  [address]  [place] [address]  P: [phone]  F: [phone]

## 2020-09-02 ENCOUNTER — HOSPITAL ENCOUNTER (OUTPATIENT)
Dept: RADIOLOGY | Facility: HOSPITAL | Age: 50
Discharge: HOME | End: 2020-09-02
Attending: OTOLARYNGOLOGY
Payer: COMMERCIAL

## 2020-09-02 DIAGNOSIS — R22.1 LOCALIZED SWELLING, MASS AND LUMP, NECK: ICD-10-CM

## 2020-09-02 LAB — SARS-COV-2 RNA RESP QL NAA+PROBE: NOT DETECTED

## 2020-09-02 PROCEDURE — 76536 US EXAM OF HEAD AND NECK: CPT | Mod: TC

## 2020-09-03 NOTE — PROGRESS NOTES
Referring Provider:    No referring provider defined for this encounter.  Subjective:   Patient: Altagracia Islas [id number], :1970   Visit date:2020 12:55 PM    Chief Complaint:  No chief complaint on file.    HPI:  Altagracia is a 50 y.o. female who I was asked to see in f/u of the following issue(s):     Anterior neck mass/lump.  Present for several years. Now somewhat enlarged  Mild discomfort.  No smoking hx  Fluctuating hoarseness for months  No GERD or allergy affirmed.    Review of Systems:  -     Allergic/Immunologic: has No Known Allergies..  -     Constitutional: Current temp:        Her meds, allergies, medical, surgical, social & family histories were reviewed & updated:  -     She has a current medication list which includes the following prescription(s): aspirin, cyclobenzaprine, fluticasone propionate, ketoconazole, loratadine, oxycodone-acetaminophen, propranolol, and triamcinolone acetonide 0.1%.  -     She  has a past medical history of Heart attack, heart artery stent, Migraine, and Skin abnormalities.   -     She does not have a problem list on file.   -     She  has a past surgical history that includes Hysterectomy;  section; and Breast surgery.  -     She  reports that she has never smoked. She has never used smokeless tobacco. She reports that she does not drink alcohol or use drugs.  -     Her family history is not on file.  -     She has No Known Allergies.    Objective:     Physical Exam:  Vitals:  There were no vitals taken for this visit.  General appearance:  Well developed, well nourished    Eyes:  Extraocular motions intact, PERRL    Communication:  Mildly raspy voice    Ears:  Otoscopy of external auditory canals and tympanic membranes was normal, clinical speech reception thresholds grossly intact, no mass/lesion of auricle.  Nose:  No masses/lesions of external nose, nasal mucosa, septum, and turbinates were within normal limits.  Mouth:  No mass/lesion of lips,  teeth, gums, hard/soft palate, tongue, tonsils, or oropharynx.    Cardiovascular:  No pedal edema; Radial Pulses +2     Neck & Lymphatics:  No cervical lymphadenopathy, no neck mass/crepitus/ asymmetry, trachea is midline, no thyroid enlargement/tenderness/mass.  Near sternoclavicular joint on the left, there is significant fullness.  Difficult to determine if this is a firm mass behind the joint or the joint itself.    Psych: Oriented x3,  Alert with normal mood and affect.     Respiration/Chest:  Symmetric expansion during respiration, normal respiratory effort.    Skin:  Warm and intact. No ulcerations of face, scalp, neck.      US SOFT TISSUE HEAD NECK THYROID     CLINICAL HISTORY:  Localized swelling, mass and lump, neck     COMPARISON:  No comparison studies are available.     FINDINGS:  The right thyroid lobe measures 5.2 x 1.6 x 1.6 cm, and the left thyroid lobe measures 3.9 x 1.8 x 1.4 cm. The isthmus measures 5 mm in thickness.     There is a 3 mm colloid cyst within the inferior right thyroid gland.     The adjacent soft tissues are normal.     Impression:     1.  There is asymmetry to the size of the thyroid lobes, left right greater than left, without a definite focal solid mass seen.  3 mm colloid cyst in the inferior right thyroid gland noted.     2.  Otherwise, normal study    Assessment & Plan:         Normal fiberoptic exam.  Discussed that what she is feeling is likely just asymmetry of the thyroid gland. She does have some difficulty with passage of solids.  Will schedule MBS. We discussed her medical conditions, treatments and plan.   Furthermore, I counseled Altagracia that while at this point I do not see any evidence of head and neck cancer, this does not eliminate the possibility of a very small malignancy that is currently undetectable on examination or developing head and neck cancer in the future.  I advised them of the signs of head and neck cancer and that they should alert my office  should they occur for more than 10-14 days (unintentional weight loss, pain or difficulty with swallowing, bleeding in the oral cavity or throat, coughing or spitting up blood, pain with mouth opening, persistent ulceration of the skin or mucosa, persistent or enlarging neck masses/swelling).  Altagracia seemed to have a clear understanding of the concerning nature of these conditions.  Altagracia should return to clinic if any issues arise (symptoms worsen or persist), otherwise we will see her back in the clinic only as needed.                  Brandin Hayden MD, FACS  [facility]   [facility]  [address]  [place] [address]  P: [phone]  F: [phone]        Patient: Altagracia Islas [id number], :1970  Procedure date:2020  Patient's medications, allergies, past medical, surgical, social and family histories were reviewed and updated as appropriate.  Chief Complaint:  No chief complaint on file.    HPI:  Altagracia is a 50 y.o. female with the history of present illness as discussed in the clinic note from today.    Procedure: Risks, benefits, and alternatives of the procedure were discussed with the patient, and the patient consented to the fiberoptic examination.  We applied a topical nasal decongestant and analgesic.  After adequate anesthesia was obtained, the flexible fiberoptic scope was passed into each nostril independently.  Each nasal cavity, the entire pharynx (nasopharynx to hypopharynx) and the larynx were visualized. At the end of the examination, the scope was removed. The patient tolerated the procedure well with no complications.     Findings:  -     Laryngeal mucosa is normal  -     Posterior commissure has mild hypertrophy  -     Lingual tonsils have no hypertrophy  -     Adenoids have no  hypertrophy  -     Right vocal fold: normal mobility     mass/lesion: none  -     Left vocal fold: normal mobility     mass/lesion: none  -     Other findings:  none    Assessment & Plan:  - see today's clinic note

## 2020-09-04 ENCOUNTER — OFFICE VISIT (OUTPATIENT)
Dept: OTOLARYNGOLOGY | Facility: CLINIC | Age: 50
End: 2020-09-04
Payer: COMMERCIAL

## 2020-09-04 VITALS
SYSTOLIC BLOOD PRESSURE: 142 MMHG | BODY MASS INDEX: 33.49 KG/M2 | HEART RATE: 69 BPM | DIASTOLIC BLOOD PRESSURE: 89 MMHG | WEIGHT: 177.25 LBS

## 2020-09-04 DIAGNOSIS — R13.10 DYSPHAGIA, UNSPECIFIED TYPE: Primary | ICD-10-CM

## 2020-09-04 PROCEDURE — 3008F PR BODY MASS INDEX (BMI) DOCUMENTED: ICD-10-PCS | Mod: CPTII,S$GLB,, | Performed by: OTOLARYNGOLOGY

## 2020-09-04 PROCEDURE — 99999 PR PBB SHADOW E&M-EST. PATIENT-LVL III: CPT | Mod: PBBFAC,,, | Performed by: OTOLARYNGOLOGY

## 2020-09-04 PROCEDURE — 99999 PR PBB SHADOW E&M-EST. PATIENT-LVL III: ICD-10-PCS | Mod: PBBFAC,,, | Performed by: OTOLARYNGOLOGY

## 2020-09-04 PROCEDURE — 31575 PR LARYNGOSCOPY, FLEXIBLE; DIAGNOSTIC: ICD-10-PCS | Mod: S$GLB,,, | Performed by: OTOLARYNGOLOGY

## 2020-09-04 PROCEDURE — 3008F BODY MASS INDEX DOCD: CPT | Mod: CPTII,S$GLB,, | Performed by: OTOLARYNGOLOGY

## 2020-09-04 PROCEDURE — 99214 OFFICE O/P EST MOD 30 MIN: CPT | Mod: 25,S$GLB,, | Performed by: OTOLARYNGOLOGY

## 2020-09-04 PROCEDURE — 31575 DIAGNOSTIC LARYNGOSCOPY: CPT | Mod: S$GLB,,, | Performed by: OTOLARYNGOLOGY

## 2020-09-04 PROCEDURE — 99214 PR OFFICE/OUTPT VISIT, EST, LEVL IV, 30-39 MIN: ICD-10-PCS | Mod: 25,S$GLB,, | Performed by: OTOLARYNGOLOGY

## 2020-09-04 NOTE — PATIENT INSTRUCTIONS
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  He receives no payments or other incentives to participate in the program. Their website offers patients excellent informational videos and assists in coordination of care.  To visit this site, please visit:    [email or web address]        WHAT IS THE THYROID GLAND?  The thyroid gland is a butterfly-shaped endocrine gland that is normally located in the lower front of the neck. The thyroids job is to make thyroid hormones, which are secreted into the blood and then carried to every tissue in the body. Thyroid hormone helps the body use energy, stay warm and keep the brain, heart, muscles, and other organs working as they should.    WHAT IS A THYROID NODULE?  The term thyroid nodule refers to an abnormal growth of thyroid cells that forms a lump within the thyroid gland. Although the vast majority of thyroid nodules are benign (noncancerous), a small proportion of thyroid nodules do contain thyroid cancer. In order to diagnose and treat thyroid cancer at the earliest stage, most thyroid nodules need some type of evaluation.        WHAT ARE THE SYMPTOMS OF A THYROID NODULE?  Most thyroid nodules do not cause symptoms. Often, thyroid nodules are discovered incidentally during a routine physical examination or on imaging tests like CT scans or neck ultrasound done for completely unrelated reasons. Occasionally, patients themselves find thyroid nodules by noticing a lump in their neck while looking in a mirror, buttoning their collar, or fastening a necklace. Abnormal thyroid function tests may occasionally be the reason a thyroid nodule is found. Thyroid nodules may produce excess amounts of thyroid hormone causing hyperthyroidism (see the Hyperthyroidism brochure). However, most thyroid nodules, including those that cancerous, are actually non-functioning, meaning tests like TSH are normal. Rarely, patients with thyroid nodules may  complain of pain in the neck, jaw, or ear. If a nodule is large enough to compress the windpipe or esophagus, it may cause difficulty with breathing, swallowing, or cause a tickle in the throat. Even less commonly, hoarseness can be caused if the nodule invades the nerve that controls the vocal cords but this is usually related to thyroid cancer.    The important points to remember are the following:    Thyroid nodules generally do not cause symptoms.  Thyroid tests are most typically normal--even when cancer is present in a nodule.  The best way to find a thyroid nodule is to make sure your doctor checks your neck!    WHAT CAUSES THYROID NODULES AND HOW COMMON ARE THEY?  We do not know what causes most thyroid nodules but they are extremely common. By age 60, about one-half of all people have a thyroid nodule that can be found either through examination or with imaging. Fortunately, over 90% of such nodules are benign. Hashimotos thyroiditis, which is the most common cause of hypothyroidism (see Hypothyroidism brochure), is associated with an increased risk of thyroid nodules. Iodine deficiency, which is very uncommon in the [place], is also known to cause thyroid nodules.    HOW IS A THYROID NODULE EVALUATED AND DIAGNOSED?  Once the nodule is discovered, your doctor will try to determine whether the rest of your thyroid is healthy or whether the entire thyroid gland has been affected by a more general condition such as hyperthyroidism or hypothyroidism. Your physician will feel the thyroid to see whether the entire gland is enlarged and whether a single or multiple nodules are present. The initial laboratory tests may include measurement of thyroid hormone (thyroxine, or T4) and thyroid-stimulating hormone (TSH) in your blood to determine whether your thyroid is functioning normally.    Since its usually not possible to determine whether a thyroid nodule is cancerous by physical examination and blood  tests alone, the evaluation of the thyroid nodules often includes specialized tests such as thyroid ultrasonography and fine needle biopsy.    THYROID ULTRASOUND:  Thyroid ultrasound is a key tool for thyroid nodule evaluation. It uses high-frequency sound waves to obtain a picture of the thyroid. This very accurate test can easily determine if a nodule is solid or fluid filled (cystic), and it can determine the precise size of the nodule. Ultrasound can help identify suspicious nodules since some ultrasound characteristics of thyroid nodules are more frequent in thyroid cancer than in noncancerous nodules. Thyroid ultrasound can identify nodules that are too small to feel during a physical examination. Ultrasound can also be used to accurately guide a needle directly into a nodule when your doctor thinks a fine needle biopsy is needed. Once the initial evaluation is completed, thyroid ultrasound can be used to keep an eye on thyroid nodules that do not require surgery to determine if they are growing or shrinking over time. The ultrasound is a painless test.    THYROID FINE NEEDLE ASPIRATION BIOPSY (FNA OR FNAB):  A fine needle biopsy of a thyroid nodule may sound frightening, but the needle used is very small and a local anesthetic may not even be necessary. This simple procedure is often done in the doctors office or by a radiologist using ultrasound. Sometimes, medications like blood thinners may need to be stopped for a few days before to the procedure. Otherwise, the biopsy does not usually require any other special preparation (no fasting). Patients typically return home or to work after the biopsy without even needing a band-aid! For a fine needle biopsy, your doctor will use a very thin needle to withdraw cells from the thyroid nodule. Ordinarily, several samples will be taken from different parts of the nodule to give your doctor the best chance of finding cancerous cells if they are present. The cells  are then examined under a microscope by a pathologist.    The report of a thyroid fine needle biopsy will usually indicate one of the following findings:    1. The nodule is benign (noncancerous).  This result is obtained in up to 80% of biopsies. The risk of overlooking a cancer when the biopsy is benign is generally less than 3 in 100 tests or 3% unless the nodule is very large. We know that patients with benign needle biopsies in nodules 3cm or larger actually have thyroid cancer about 13% of the time. Smaller benign thyroid nodules do not need to be removed unless they are causing symptoms like choking or difficulty swallowing. Follow up ultrasound exams are important. Occasionally, another biopsy may be required in the future, especially if the nodule grows over time.    2. The nodule is malignant (cancerous) or suspicious for malignancy .  malignant result is obtained in about 5% of biopsies and is most often due to papillary cancer, which is the most common type of thyroid cancer. A suspicious biopsy has a 50-75% risk of cancer in the nodule. These diagnoses require surgical removal of the thyroid    3. The nodule is indeterminate. This is actually a group of several diagnoses that may occur in up to 20% of cases. An Indeterminate finding means that even though an adequate number of cells was removed during the fine needle biopsy, examination with a microscope cannot reliably classify the result as benign or cancer.  The biopsy may be indeterminate because the nodule is described as a Follicular Lesion. These nodules are cancerous 20- 30% of the time. However, the diagnosis can only be made by surgery. Since the odds that the nodule is not a cancer are much better here (70-80%), only the side of the thyroid with the nodule is usually removed. If a cancer is found, the remaining thyroid gland usually must be removed as well. If the surgery confirms that no cancer is present, no additional surgery to complete  the thyroidectomy is necessary.    4. The biopsy may also be indeterminate because the cells from the nodule have features that cannot be placed in one of the other diagnostic categories. This diagnosis is called atypia, or a follicular lesion of undetermined significance. Diagnoses in this category will contain cancer rarely, so repeat evaluation with FNA or surgical biopsy to remove half of the thyroid containing the nodule is usually recommended.    5.  The biopsy may also be nondiagnostic or inadequate.   This result is obtained in less than 5% of cases when an ultrasound is used to guide the FNA. This result indicates that not enough cells were obtained to make a diagnosis but is a common result if the nodule is a cyst. These nodules may require reevaluation with second fine needle biopsy, or may need to be removed surgically depending on the clinical judgment of your doctor.        HOW ARE THYROID NODULES TREATED?  All thyroid nodules that are found to contain a thyroid cancer, or that are highly suspicious of containing a cancer, should be removed surgically by an experienced thyroid surgeon. Most thyroid cancers are curable and rarely cause life-threatening problems . Thyroid nodules that are benign by FNA or too small to biopsy should still be watched closely with ultrasound examination on a schedule your doctor will discuss with you. Surgery may still be recommended even for a nodule that is benign by FNA if it continues to grow, or develops worrisome features on ultrasound over the course of follow up.

## 2021-04-29 ENCOUNTER — PATIENT MESSAGE (OUTPATIENT)
Dept: RESEARCH | Facility: HOSPITAL | Age: 51
End: 2021-04-29

## 2022-07-27 ENCOUNTER — OFFICE VISIT (OUTPATIENT)
Dept: URGENT CARE | Facility: CLINIC | Age: 52
End: 2022-07-27
Payer: COMMERCIAL

## 2022-07-27 VITALS
BODY MASS INDEX: 33.42 KG/M2 | RESPIRATION RATE: 16 BRPM | DIASTOLIC BLOOD PRESSURE: 72 MMHG | TEMPERATURE: 98 F | HEIGHT: 61 IN | WEIGHT: 177 LBS | HEART RATE: 89 BPM | OXYGEN SATURATION: 100 % | SYSTOLIC BLOOD PRESSURE: 139 MMHG

## 2022-07-27 DIAGNOSIS — U07.1 COVID-19 VIRUS INFECTION: Primary | ICD-10-CM

## 2022-07-27 DIAGNOSIS — R05.9 COUGH: ICD-10-CM

## 2022-07-27 LAB
CTP QC/QA: YES
SARS-COV-2 RDRP RESP QL NAA+PROBE: POSITIVE

## 2022-07-27 PROCEDURE — 99214 PR OFFICE/OUTPT VISIT, EST, LEVL IV, 30-39 MIN: ICD-10-PCS | Mod: S$GLB,,, | Performed by: PHYSICIAN ASSISTANT

## 2022-07-27 PROCEDURE — 3008F BODY MASS INDEX DOCD: CPT | Mod: CPTII,S$GLB,, | Performed by: PHYSICIAN ASSISTANT

## 2022-07-27 PROCEDURE — 3075F SYST BP GE 130 - 139MM HG: CPT | Mod: CPTII,S$GLB,, | Performed by: PHYSICIAN ASSISTANT

## 2022-07-27 PROCEDURE — U0002: ICD-10-PCS | Mod: QW,S$GLB,, | Performed by: PHYSICIAN ASSISTANT

## 2022-07-27 PROCEDURE — 99214 OFFICE O/P EST MOD 30 MIN: CPT | Mod: S$GLB,,, | Performed by: PHYSICIAN ASSISTANT

## 2022-07-27 PROCEDURE — 1159F MED LIST DOCD IN RCRD: CPT | Mod: CPTII,S$GLB,, | Performed by: PHYSICIAN ASSISTANT

## 2022-07-27 PROCEDURE — 3008F PR BODY MASS INDEX (BMI) DOCUMENTED: ICD-10-PCS | Mod: CPTII,S$GLB,, | Performed by: PHYSICIAN ASSISTANT

## 2022-07-27 PROCEDURE — 3078F DIAST BP <80 MM HG: CPT | Mod: CPTII,S$GLB,, | Performed by: PHYSICIAN ASSISTANT

## 2022-07-27 PROCEDURE — 3075F PR MOST RECENT SYSTOLIC BLOOD PRESS GE 130-139MM HG: ICD-10-PCS | Mod: CPTII,S$GLB,, | Performed by: PHYSICIAN ASSISTANT

## 2022-07-27 PROCEDURE — U0002 COVID-19 LAB TEST NON-CDC: HCPCS | Mod: QW,S$GLB,, | Performed by: PHYSICIAN ASSISTANT

## 2022-07-27 PROCEDURE — 1159F PR MEDICATION LIST DOCUMENTED IN MEDICAL RECORD: ICD-10-PCS | Mod: CPTII,S$GLB,, | Performed by: PHYSICIAN ASSISTANT

## 2022-07-27 PROCEDURE — 3078F PR MOST RECENT DIASTOLIC BLOOD PRESSURE < 80 MM HG: ICD-10-PCS | Mod: CPTII,S$GLB,, | Performed by: PHYSICIAN ASSISTANT

## 2022-07-27 RX ORDER — BENZONATATE 100 MG/1
200 CAPSULE ORAL 3 TIMES DAILY PRN
Qty: 30 CAPSULE | Refills: 0 | Status: SHIPPED | OUTPATIENT
Start: 2022-07-27

## 2022-07-27 NOTE — PATIENT INSTRUCTIONS
You have tested positive for COVID-19 today.      Please note that patients who test positive for COVID-19 are required by the CDC to undergo isolation for 5 days after their symptoms first began.   - If you tested positive and do not have symptoms, you must isolate for 5 days starting on the day of the positive test.   - If you tested positive and have symptoms, you must isolate for 5 days starting on the day of the first symptoms,  not the day of the positive test.    This is the most important part, both the CDC and the [facility] emphasize that you do not test out of isolation.  In fact, we do not retest if you were positive in the last 90 days.    After 5 days, if your symptoms have improved and you have not had fever on day 5, you can return to the community on day 6- NO TESTING REQUIRED!     After your 5 days of isolation are completed, the CDC recommends strict mask use for the first 5 days that you come out of isolation.     During quarantine:   Separate yourself from other people and animals in your home.  Call ahead before visiting your doctor.  Wear a facemask.  Cover your coughs and sneezes.  Wash your hands often with soap and water; hand  can be used, too.  Avoid sharing personal household items.  Wipe down surfaces used daily.  Monitor your symptoms. Seek prompt medical attention if your illness is worsening (e.g., difficulty breathing).   Before seeking care, call your healthcare provider.  If you have a medical emergency and need to call 911, notify the dispatch personnel that you have, or are being evaluated for COVID-19. If possible, put on a facemask before emergency medical services arrive.         CDC Testing and Quarantine Guidelines for household members, intimate partners, and caregivers in a home setting of a known-positive COVID-19 person:    ·     A 'close exposure' is defined as anyone who has had an exposure (masked or unmasked) to a known COVID -19 positive person within 6 feet of  someone for a cumulative total of 15 minutes or more over a 24-hour period.    ·     Vaccinated: patients who have been boosted or completed the primary series of Pfizer or Moderna vaccine within the last 6 months or completed the primary series of J&J vaccine within the last 2 months and/or had a positive test within 90 days   - do NOT need to quarantine after contact with someone who had COVID-19 unless they have symptoms.   - should get tested 3-5 days after their exposure (if they have not had a positive test within the last 90 days), even if they don't have symptoms and wear a mask indoors in public for 10 days following exposure or until their test result is negative on day 5.  - If you develop symptoms test and quarantine.    ·     Unvaccinated, or are more than six months out from their second mRNA dose (or more than 2 months after the J&J vaccine) and not yet boosted,  and/or NOT had a positive test within 90 days and meet 'close exposure'  - you are required by CDC guidelines to quarantine for at least 5 days from time of exposure followed by 5 days of strict masking. It is recommended, but not required to test after 5 days, unless you develop symptoms, in which case you should test at that time.  - If you do decide to test at 5 days and are asymptomatic, the risk is that if you test without symptoms (on Day 5 for example) and you are positive, your 5 day isolation begins on that day, and you turned your 5 day quarantine into 10 days.  - If your exposure does not meet the above definition, you can return to your normal daily activities to include social distancing, wearing a mask and frequent handwashing.       Close contacts should also follow these recommendations:  Make sure that you understand and can help the patient follow their provider's instructions for medication(s) and care. You should help the patient with basic needs in the home and provide support for getting groceries, prescriptions, and  other personal needs.  Monitor the patient's symptoms. If the patient is getting sicker, call his or her healthcare provider and tell them that the patient has laboratory-confirmed COVID-19. If the patient has a medical emergency and you need to call 911, notify the dispatch personnel that the patient has, or is being evaluated for COVID-19.  Household members should stay in another room or be  from the patient. Household members should use a separate bedroom and bathroom, if available.  Prohibit visitors.  Household members should care for any pets in the home.  Make sure that shared spaces in the home have good air flow, such as by an air conditioner or an opened window, weather permitting.  Perform hand hygiene frequently. Wash your hands often with soap and water for at least 20 seconds or use an alcohol-based hand  (that contains > 60% alcohol) covering all surfaces of your hands and rubbing them together until they feel dry. Soap and water should be used preferentially.  Avoid touching your eyes, nose, and mouth.  The patient should wear a facemask. If the patient is not able to wear a facemask (for example, because it causes trouble breathing), caregivers should wear a mask when they are in the same room as the patient.  Wear a disposable facemask and gloves when you touch or have contact with the patient's blood, stool, or body fluids, such as saliva, sputum, nasal mucus, vomit, urine.  Throw out disposable facemasks and gloves after using them. Do not reuse.  When removing personal protective equipment, first remove and dispose of gloves. Then, immediately clean your hands with soap and water or alcohol-based hand . Next, remove and dispose of facemask, and immediately clean your hands again with soap and water or alcohol-based hand .  You should not share dishes, drinking glasses, cups, eating utensils, towels, bedding, or other items with the patient. After the patient  uses these items, you should wash them thoroughly (see below Wash laundry thoroughly).  Clean all high-touch surfaces, such as counters, tabletops, doorknobs, bathroom fixtures, toilets, phones, keyboards, tablets, and bedside tables, every day. Also, clean any surfaces that may have blood, stool, or body fluids on them.  Use a household cleaning spray or wipe, according to the label instructions. Labels contain instructions for safe and effective use of the cleaning product including precautions you should take when applying the product, such as wearing gloves and making sure you have good ventilation during use of the product.  Wash laundry thoroughly.  Immediately remove and wash clothes or bedding that have blood, stool, or body fluids on them.  Wear disposable gloves while handling soiled items and keep soiled items away from your body. Clean your hands (with soap and water or an alcohol-based hand ) immediately after removing your gloves.  Read and follow directions on labels of laundry or clothing items and detergent. In general, using a normal laundry detergent according to washing machine instructions and dry thoroughly using the warmest temperatures recommended on the clothing label.  Place all used disposable gloves, facemasks, and other contaminated items in a lined container before disposing of them with other household waste. Clean your hands (with soap and water or an alcohol-based hand ) immediately after handling these items. Soap and water should be used preferentially if hands are visibly dirty.  Discuss any additional questions with your state or local health department or healthcare provider. Check available hours when contacting your local health department.     You must understand that you've received an Urgent Care treatment only and that you may be released before all your medical problems are known or treated. You, the patient, will arrange for follow up care as  instructed. Follow up with your PCP or specialty clinic as directed within 2-5 days if not improved or as needed.  You can call [phone] to schedule an appointment with the appropriate provider.  If your condition worsens we recommend that you receive another evaluation at the emergency room immediately or contact your primary medical clinics after hours call service to discuss your concerns.  Please return here or go to the Emergency Department for any concerns or worsening of condition.       If we discussed the COVID surveillance/home monitoring program, you will also get a call from [facility] pharmacy at the [place] or [facility] location to get a pulse oximeter to monitor your blood oxygen level.  This will be followed by a COVID surveillance team daily through [email or web address] (available on computer or through mobile luann).

## 2022-07-27 NOTE — PROGRESS NOTES
"Subjective:       Patient ID: Altagracia Islas is a 52 y.o. female.    Vitals:  height is 5' 1" (1.549 m) and weight is 80.3 kg (177 lb). Her oral temperature is 98.4 °F (36.9 °C). Her blood pressure is 139/72 and her pulse is 89. Her respiration is 16 and oxygen saturation is 100%.     Chief Complaint: Cough (Nasal congestion, fatigue, body aches)    Pt presents to the clinic today with cough, runny nose,fatigue, and body aches that began on Monday.    Cough  This is a new problem. The current episode started in the past 7 days (3 days ago). The problem has been gradually worsening. The problem occurs every few minutes. The cough is non-productive. Associated symptoms include myalgias, nasal congestion and postnasal drip. Pertinent negatives include no chest pain, fever, headaches, sore throat, shortness of breath or wheezing. Associated symptoms comments: Fatigue, body aches. Nothing aggravates the symptoms. She has tried OTC cough suppressant (Percocet at 9:30 this morning.) for the symptoms. The treatment provided moderate relief.       Constitution: Positive for fatigue. Negative for fever.   HENT: Positive for postnasal drip. Negative for sore throat.    Cardiovascular: Negative for chest pain.   Respiratory: Positive for cough. Negative for shortness of breath and wheezing.    Gastrointestinal: Negative.    Musculoskeletal: Positive for muscle ache.   Skin: Negative.    Neurological: Negative for headaches, numbness and tingling.       Objective:      Physical Exam   Constitutional: She appears well-developed.  Non-toxic appearance. She does not appear ill. No distress.   HENT:   Head: Normocephalic and atraumatic.   Ears:   Right Ear: Tympanic membrane, external ear and ear canal normal.   Left Ear: External ear normal.   Nose: Congestion present.   Eyes: Conjunctivae and EOM are normal.   Neck: Neck supple.   Pulmonary/Chest: Effort normal and breath sounds normal.   Abdominal: Normal appearance. "   Musculoskeletal: Normal range of motion.         General: Normal range of motion.   Neurological: no focal deficit. She is alert. She displays no weakness. Gait normal.   Skin: Skin is warm, dry, not diaphoretic, not pale and no rash.   Psychiatric: Her behavior is normal.         Results for orders placed or performed in visit on 07/27/22   POCT COVID-19 Rapid Screening   Result Value Ref Range    POC Rapid COVID Positive (A) Negative     Acceptable Yes        Assessment:       1. COVID-19 virus infection    2. Cough          Plan:       - Rapid COVID-19 positive    - Covid Risk Score: 1 Pt is considered low risk (score < 3) and therefore does not meet criteria for Paxlovid.  - Advised patient to stay home and self quarantine for 5 days from onset of symptoms, or 5 days from positive test if asymptomatic. Advised must be fever free for at least 24 hours to discontinue isolation.  -Tylenol as needed for fever control. OTC medications prn for cold symptoms. Tessalon perles prn for cough.  - Strict ED precautions given for any emergent symptoms.    COVID-19 virus infection  -     benzonatate (TESSALON PERLES) 100 MG capsule; Take 2 capsules (200 mg total) by mouth 3 (three) times daily as needed for Cough.  Dispense: 30 capsule; Refill: 0    Cough  -     POCT COVID-19 Rapid Screening  -     benzonatate (TESSALON PERLES) 100 MG capsule; Take 2 capsules (200 mg total) by mouth 3 (three) times daily as needed for Cough.  Dispense: 30 capsule; Refill: 0

## 2022-07-27 NOTE — LETTER
[id number] ? [place], [address] ? Phone [phone] ? Fax             Return to Work/School    Patient: Altagracia Islas  YOB: 1970   Date: 07/27/2022      To Whom It May Concern:     Altagracia Islas was in contact with/seen in my office on 07/27/2022. COVID-19 is present in our communities across the [facility]. Not all patients are eligible or appropriate to be tested. In this situation, your employee meets the following criteria:     Altagracia Islas has met the criteria for COVID-19 testing and has a POSITIVE result. She can return to work once they are asymptomatic for 24 hours without the use of fever reducing medications AND at least five days from the start of symptoms (or from the first positive result if they have no symptoms).      If you have any questions or concerns, or if I can be of further assistance, please do not hesitate to contact me.     Sincerely,    Alisa Rodarte PA-C

## 2022-07-30 ENCOUNTER — TELEPHONE (OUTPATIENT)
Dept: URGENT CARE | Facility: CLINIC | Age: 52
End: 2022-07-30
Payer: COMMERCIAL

## 2022-08-09 ENCOUNTER — OFFICE VISIT (OUTPATIENT)
Dept: PRIMARY CARE CLINIC | Facility: CLINIC | Age: 52
End: 2022-08-09
Payer: COMMERCIAL

## 2022-08-09 ENCOUNTER — LAB VISIT (OUTPATIENT)
Dept: LAB | Facility: HOSPITAL | Age: 52
End: 2022-08-09
Attending: PHYSICIAN ASSISTANT
Payer: COMMERCIAL

## 2022-08-09 VITALS
HEART RATE: 78 BPM | RESPIRATION RATE: 18 BRPM | OXYGEN SATURATION: 98 % | SYSTOLIC BLOOD PRESSURE: 120 MMHG | WEIGHT: 178.56 LBS | DIASTOLIC BLOOD PRESSURE: 82 MMHG | BODY MASS INDEX: 33.74 KG/M2

## 2022-08-09 DIAGNOSIS — I25.10 CAD IN NATIVE ARTERY: Primary | ICD-10-CM

## 2022-08-09 DIAGNOSIS — I25.10 CAD IN NATIVE ARTERY: ICD-10-CM

## 2022-08-09 LAB
ALBUMIN SERPL BCP-MCNC: 3.7 G/DL (ref 3.5–5.2)
ALP SERPL-CCNC: 68 U/L (ref 55–135)
ALT SERPL W/O P-5'-P-CCNC: 17 U/L (ref 10–44)
ANION GAP SERPL CALC-SCNC: 8 MMOL/L (ref 8–16)
AST SERPL-CCNC: 16 U/L (ref 10–40)
BASOPHILS # BLD AUTO: 0.02 K/UL (ref 0–0.2)
BASOPHILS NFR BLD: 0.2 % (ref 0–1.9)
BILIRUB SERPL-MCNC: 0.6 MG/DL (ref 0.1–1)
BUN SERPL-MCNC: 7 MG/DL (ref 6–20)
CALCIUM SERPL-MCNC: 8.8 MG/DL (ref 8.7–10.5)
CHLORIDE SERPL-SCNC: 105 MMOL/L (ref 95–110)
CHOLEST SERPL-MCNC: 167 MG/DL (ref 120–199)
CHOLEST/HDLC SERPL: 2.5 {RATIO} (ref 2–5)
CO2 SERPL-SCNC: 25 MMOL/L (ref 23–29)
CREAT SERPL-MCNC: 0.7 MG/DL (ref 0.5–1.4)
DIFFERENTIAL METHOD: ABNORMAL
EOSINOPHIL # BLD AUTO: 0.2 K/UL (ref 0–0.5)
EOSINOPHIL NFR BLD: 2 % (ref 0–8)
ERYTHROCYTE [DISTWIDTH] IN BLOOD BY AUTOMATED COUNT: 12.9 % (ref 11.5–14.5)
EST. GFR  (NO RACE VARIABLE): >60 ML/MIN/1.73 M^2
ESTIMATED AVG GLUCOSE: 105 MG/DL (ref 68–131)
GLUCOSE SERPL-MCNC: 73 MG/DL (ref 70–110)
HBA1C MFR BLD: 5.3 % (ref 4–5.6)
HCT VFR BLD AUTO: 40.7 % (ref 37–48.5)
HDLC SERPL-MCNC: 66 MG/DL (ref 40–75)
HDLC SERPL: 39.5 % (ref 20–50)
HGB BLD-MCNC: 12.8 G/DL (ref 12–16)
IMM GRANULOCYTES # BLD AUTO: 0.02 K/UL (ref 0–0.04)
IMM GRANULOCYTES NFR BLD AUTO: 0.2 % (ref 0–0.5)
INSULIN COLLECTION INTERVAL: NORMAL
INSULIN SERPL-ACNC: 4.5 UU/ML
LDLC SERPL CALC-MCNC: 91.2 MG/DL (ref 63–159)
LYMPHOCYTES # BLD AUTO: 2.7 K/UL (ref 1–4.8)
LYMPHOCYTES NFR BLD: 31.6 % (ref 18–48)
MCH RBC QN AUTO: 29 PG (ref 27–31)
MCHC RBC AUTO-ENTMCNC: 31.4 G/DL (ref 32–36)
MCV RBC AUTO: 92 FL (ref 82–98)
MONOCYTES # BLD AUTO: 0.8 K/UL (ref 0.3–1)
MONOCYTES NFR BLD: 9.3 % (ref 4–15)
NEUTROPHILS # BLD AUTO: 4.8 K/UL (ref 1.8–7.7)
NEUTROPHILS NFR BLD: 56.7 % (ref 38–73)
NONHDLC SERPL-MCNC: 101 MG/DL
NRBC BLD-RTO: 0 /100 WBC
PLATELET # BLD AUTO: 283 K/UL (ref 150–450)
PMV BLD AUTO: 11.2 FL (ref 9.2–12.9)
POTASSIUM SERPL-SCNC: 3.7 MMOL/L (ref 3.5–5.1)
PROT SERPL-MCNC: 7.2 G/DL (ref 6–8.4)
RBC # BLD AUTO: 4.42 M/UL (ref 4–5.4)
SODIUM SERPL-SCNC: 138 MMOL/L (ref 136–145)
TRIGL SERPL-MCNC: 49 MG/DL (ref 30–150)
WBC # BLD AUTO: 8.41 K/UL (ref 3.9–12.7)

## 2022-08-09 PROCEDURE — 36415 COLL VENOUS BLD VENIPUNCTURE: CPT | Performed by: PHYSICIAN ASSISTANT

## 2022-08-09 PROCEDURE — 1159F MED LIST DOCD IN RCRD: CPT | Mod: CPTII,S$GLB,, | Performed by: PHYSICIAN ASSISTANT

## 2022-08-09 PROCEDURE — 3074F SYST BP LT 130 MM HG: CPT | Mod: CPTII,S$GLB,, | Performed by: PHYSICIAN ASSISTANT

## 2022-08-09 PROCEDURE — 80053 COMPREHEN METABOLIC PANEL: CPT | Performed by: PHYSICIAN ASSISTANT

## 2022-08-09 PROCEDURE — 80061 LIPID PANEL: CPT | Performed by: PHYSICIAN ASSISTANT

## 2022-08-09 PROCEDURE — 1159F PR MEDICATION LIST DOCUMENTED IN MEDICAL RECORD: ICD-10-PCS | Mod: CPTII,S$GLB,, | Performed by: PHYSICIAN ASSISTANT

## 2022-08-09 PROCEDURE — 99205 OFFICE O/P NEW HI 60 MIN: CPT | Mod: S$GLB,,, | Performed by: PHYSICIAN ASSISTANT

## 2022-08-09 PROCEDURE — 3074F PR MOST RECENT SYSTOLIC BLOOD PRESSURE < 130 MM HG: ICD-10-PCS | Mod: CPTII,S$GLB,, | Performed by: PHYSICIAN ASSISTANT

## 2022-08-09 PROCEDURE — 3044F PR MOST RECENT HEMOGLOBIN A1C LEVEL <7.0%: ICD-10-PCS | Mod: CPTII,S$GLB,, | Performed by: PHYSICIAN ASSISTANT

## 2022-08-09 PROCEDURE — 99999 PR PBB SHADOW E&M-EST. PATIENT-LVL IV: CPT | Mod: PBBFAC,,, | Performed by: PHYSICIAN ASSISTANT

## 2022-08-09 PROCEDURE — 3079F DIAST BP 80-89 MM HG: CPT | Mod: CPTII,S$GLB,, | Performed by: PHYSICIAN ASSISTANT

## 2022-08-09 PROCEDURE — 3044F HG A1C LEVEL LT 7.0%: CPT | Mod: CPTII,S$GLB,, | Performed by: PHYSICIAN ASSISTANT

## 2022-08-09 PROCEDURE — 3008F PR BODY MASS INDEX (BMI) DOCUMENTED: ICD-10-PCS | Mod: CPTII,S$GLB,, | Performed by: PHYSICIAN ASSISTANT

## 2022-08-09 PROCEDURE — 3079F PR MOST RECENT DIASTOLIC BLOOD PRESSURE 80-89 MM HG: ICD-10-PCS | Mod: CPTII,S$GLB,, | Performed by: PHYSICIAN ASSISTANT

## 2022-08-09 PROCEDURE — 85025 COMPLETE CBC W/AUTO DIFF WBC: CPT | Performed by: PHYSICIAN ASSISTANT

## 2022-08-09 PROCEDURE — 83036 HEMOGLOBIN GLYCOSYLATED A1C: CPT | Performed by: PHYSICIAN ASSISTANT

## 2022-08-09 PROCEDURE — 3008F BODY MASS INDEX DOCD: CPT | Mod: CPTII,S$GLB,, | Performed by: PHYSICIAN ASSISTANT

## 2022-08-09 PROCEDURE — 99205 PR OFFICE/OUTPT VISIT, NEW, LEVL V, 60-74 MIN: ICD-10-PCS | Mod: S$GLB,,, | Performed by: PHYSICIAN ASSISTANT

## 2022-08-09 PROCEDURE — 83525 ASSAY OF INSULIN: CPT | Performed by: PHYSICIAN ASSISTANT

## 2022-08-09 PROCEDURE — 99999 PR PBB SHADOW E&M-EST. PATIENT-LVL IV: ICD-10-PCS | Mod: PBBFAC,,, | Performed by: PHYSICIAN ASSISTANT

## 2022-08-09 RX ORDER — SEMAGLUTIDE 1.34 MG/ML
0.25 INJECTION, SOLUTION SUBCUTANEOUS
Qty: 1 PEN | Refills: 5 | Status: SHIPPED | OUTPATIENT
Start: 2022-08-09 | End: 2023-08-09

## 2022-08-09 NOTE — PROGRESS NOTES
Subjective:       Patient ID: Altagracia Islas is a 52 y.o. female.    HPI    Lifestyle related medical issues:     History of CAD/ or taksybo cardiomyopathy?  Past Medical History:   Diagnosis Date    Heart attack     Hx of heart artery stent     Migraine     Skin abnormalities        Past Surgical History:   Procedure Laterality Date    BREAST SURGERY       SECTION      HYSTERECTOMY      paritial     History reviewed. No pertinent family history.      Physical activity: moves around a good bit on a normal day,   Diet:   Skips meals , in afternoon starts grazing   Limits fast foods  Makes sandwichs at home   Cooks   Snacks late at night - varies but not fresh fruits/veggies, more packaged/processed things       Sleep: no issues  Stress: moderate   Overall wellbeing: good   Social support: good   Barriers to goals: time/ getting in a routine     Weight goal - 10% wt loss     Wt Readings from Last 3 Encounters:   22 81 kg (178 lb 9.2 oz)   22 80.3 kg (177 lb)   20 80.4 kg (177 lb 4 oz)           Current Outpatient Medications   Medication Instructions    aspirin (ECOTRIN) 81 mg, Oral    benzonatate (TESSALON PERLES) 200 mg, Oral, 3 times daily PRN    cyclobenzaprine (FLEXERIL) 10 MG tablet No dose, route, or frequency recorded.    fluticasone (FLONASE) 50 mcg/actuation nasal spray fluticasone 50 mcg/actuation nasal spray,suspension   Spray 2 sprays every day by intranasal route.    ketoconazole (NIZORAL) 2 % cream APPLY ON THE SKIN BID PRN.  USE ON THE FACE    loratadine (CLARITIN) 10 mg, Oral, Daily    oxyCODONE-acetaminophen (PERCOCET) 7.5-325 mg per tablet No dose, route, or frequency recorded.    OZEMPIC 0.25 mg, Subcutaneous, Every 7 days    propranolol (INDERAL) 20 MG tablet propranolol    triamcinolone acetonide 0.1% (KENALOG) 0.1 % ointment No dose, route, or frequency recorded.            Review of Systems   Constitutional: Negative for fatigue, fever and unexpected  weight change.   HENT: Negative for sore throat and trouble swallowing.    Respiratory: Negative for cough and shortness of breath.    Cardiovascular: Negative for chest pain.   Gastrointestinal: Negative for abdominal pain.   Hematological: Negative for adenopathy. Does not bruise/bleed easily.   All other systems reviewed and are negative.      Objective:   /82   Pulse 78   Resp 18   Wt 81 kg (178 lb 9.2 oz)   SpO2 98%   BMI 33.74 kg/m²      Physical Exam  Constitutional:       Appearance: Normal appearance.   HENT:      Head: Normocephalic and atraumatic.      Mouth/Throat:      Mouth: Mucous membranes are moist.   Eyes:      Pupils: Pupils are equal, round, and reactive to light.   Cardiovascular:      Rate and Rhythm: Normal rate and regular rhythm.      Pulses: Normal pulses.      Heart sounds: Normal heart sounds.   Pulmonary:      Effort: Pulmonary effort is normal.   Musculoskeletal:      Cervical back: Normal range of motion and neck supple.   Lymphadenopathy:      Cervical: No cervical adenopathy.   Neurological:      Mental Status: She is alert.           Lab Results   Component Value Date    WBC 8.41 08/09/2022    HGB 12.8 08/09/2022    HCT 40.7 08/09/2022     08/09/2022    CHOL 167 08/09/2022    TRIG 49 08/09/2022    HDL 66 08/09/2022    ALT 17 08/09/2022    AST 16 08/09/2022     08/09/2022    K 3.7 08/09/2022     08/09/2022    CREATININE 0.7 08/09/2022    BUN 7 08/09/2022    CO2 25 08/09/2022    HGBA1C 5.3 08/09/2022       Assessment:       1. CAD in native artery    2. BMI 33.0-33.9,adult        Plan:   CAD in native artery  -     Insulin, random; Future; Expected date: 08/09/2022  -     Lipid Panel; Future; Expected date: 02/09/2023  -     Hemoglobin A1C; Future; Expected date: 11/09/2022  -     Comprehensive Metabolic Panel; Future; Expected date: 02/09/2023  -     CBC Auto Differential; Future; Expected date: 08/09/2022    BMI 33.0-33.9,adult  -     Insulin, random;  Future; Expected date: 08/09/2022  -     Lipid Panel; Future; Expected date: 02/09/2023  -     Hemoglobin A1C; Future; Expected date: 11/09/2022  -     Comprehensive Metabolic Panel; Future; Expected date: 02/09/2023  -     CBC Auto Differential; Future; Expected date: 08/09/2022    Other orders  -     semaglutide (OZEMPIC) 0.25 mg or 0.5 mg(2 mg/1.5 mL) pen injector; Inject 0.25 mg into the skin every 7 days.  Dispense: 1 pen; Refill: 5          Patient request ozempic ( or glp-1) discussed insurance may not cover  Will order labs to assess overall health   Diet changes encouraged, shopping guide, food logs, freq fueling, snack ideas, etc given   Increase exercise.  Want to engage in 100-150 mins per week!     Follow up 1 month     Time spent: 60 minutes in face to face and with review prior and after of chart notes from specialists, in regards to diagnosis, prognosis, review of lab and test results, benefits of treatment as well as management of disease, counseling of patient and coordination of care between various health care providers .

## 2022-08-09 NOTE — PATIENT INSTRUCTIONS
"      PRODUCE  [] All fresh fruit   [] All fresh vegetables   [] All fresh herbs  [] All herb purees + pastes  [] Pre-spiralized vegetable noodles   [] Steam-In-The-Bag begetables  [] Riced cauliflower  [] Jicama sticks  [] Love Beets  all varieties  [] Wholly Guacamole  all varieties  [] Hummus  all varieties, chickpea + vegetable  [] Tofu Shirataki noodles    [] Tofu  all varieties  [] Tempeh  all varieties    PROTEIN  CHICKEN   [] Boneless, skinless breasts  [] Boneless, skinless thighs  [] Ground chicken breast, at least 93% lean  [] Chicken breast cutlet  [] Aidell's  Chicken Sausage + Chicken Meatballs    TURKEY   [] Turkey breast tenderloin   [] Ground turkey breast, at least 93% lean  [] Phelan Naturals  Turkey Sausage    BEEF  [] Tenderloin  [] Sirloin  [] Top Loin  [] Flank Steak  [] Round Steak  [] Filet  [] Lean ground beef, at least 93% lean + grass-fed preferable    PORK  [] Tenderloin  [] Pork Chop  [] Center Cut  [] Phelan Naturals  No-Sugar Jacobs    BISON  [] [place]  90 - 95% lean    SEAFOOD  [] All fresh fish + seafood; locally sourced when possible  [] Smoked salmon    HEAT + EAT ENTREES   [] Michoacano's Natural Foods  Chicken, Pork, Beef  [] Teto  "All Natural" Grilled Chicken Breast + Strips, all varieties    SAUCES SPREADS + DIPS  [] Bitchin Sauce  Original, Chipotle, Cilantro [place]  [] Lorie's Kitchen  Tzatziki Yogurt Dip, Babaganoush, Hummus  [] Wholly Guacamole  all varieties  [] Hummus  all varieties  [] [place] Gringo Salsa  all varieties  [] Mrs. Madie's Salsa  all varieties  [] Stubb's All Natural BBQ Sauce  [] Primal Kitchen  Singh, Ketchup, BBQ Sauce  [] Primal Kitchen Pasta Sauce  Roasted Garlic, Tomato Basil, no-dairy Vodka Sauce  [] Sal & Valentina's  HeartSmart Pasta Sauce    DAIRY/DAIRY SUBSTITUTES/EGGS  EGGS   [] All eggs  cage-free, pasture-raise preferable  [] Crekelseyi  egg wraps  [] Vital Farms  Pasture-Raised Egg Bites  [] JUST Egg [vegan]     CREAMERS "   [] Califia  Better Half, original + vanilla unsweetened  [] NutPods  all varieties    MILK   [] Horizon Organic  all varieties except chocolate  [] Organic Valley  all varieties except chocolate  [] Organic Valley  ultra-filtered, reduced fat milk     PLANT_BASED MILK ALTERNATIVES  [] All unsweetened almond milks  original, vanilla + chocolate  [] Ripple  unsweetened   [] Milkadamia  original +_ vanilla, unsweetened   [] Forager  original + vanilla, unsweetened   [] Silk Organic  soy milk, unsweetened  [] Oatly  unsweetened  [] Califia  regular + protein-fortified oat milk, unsweetened     CHEESES  [] Regular or reduced fat cheeses  [] BelGioso  Fresh Mozzarella Snack Packs, Parmesan Power-full Snack   [] Goat cheese  [] Fresh mozzarella  [] String cheese  all varieties  [] Jihan Cottage Cheese  [] Crystal's Cultured Cottage Cheese  [] Magy Life 'Just Like Mozzarella'  plant-based shreds and other varieties  [] Parmela Creamery  plant-based shredded cheese    YOGURT  [] Fage  2% low-fat, plain  [] Siggi's  plain, vanilla  [] Chobani Greek  nonfat + whole milk yogurt, plain   [] Chobani Less Sugar  all flavored varieties   [] Oikos Greek  nonfat, plain  [] Two Good  all varieties   [] [facility] Provisions  plain  [] [place] Organic  low-fat + nonfat, plain  [] [facility]  goat milk yogurt, plain  [] Kefit  unsweetened, plain  [] Forager  Greek style unsweetened, plain [dairy-free]  [] [place]  unsweetened Greek style, plain [dairy-free]  [] [facility]  almond milk yogurt, vanilla or plain, unsweetened [dairy-free]    FREEZER SECTION  FROZEN VEGGIES  [] All plain frozen veggies + greens [e.g. broccoli, brussels, carrots, okra, mushrooms, zucchini, yellow squash, butternut squash, kale, spinach, jessica greens]  [] Riced veggies [e.g. cauliflower, broccoli, butternut squash]  [] Edamame  all varieties  [] Green Giant  [] Veggie Spirals  [] Marinated Veggies [e.g. eggplant, peppers,  zucchini]  [] Simply Steam [place] Sprouts  [] Birds Eye  [] Power Blend Italian Style  lentils, broccoli, kale, zucchini  []  [place] Sprouts & Carrots  [] Oven Roasters Broccoli & Cauliflower  [] [place] Blend  [] Tattooed   [] Green Bean Blend  [] Farmer's Market Ratatouille  [] Butter Balsamic Glazed Vegetables  [] Riced Cauliflower & Quinoa Mediterranean Style  [] Zita's Good Life  Southern Style Greens [sauteed kale + onion]    FROZEN FRUITS  [] All unsweetened frozen fruits  all varieties  [] Dole Fruits & Veggie Blends  Berries 'n Kale  [] Dole Mix-ins  Triple Berry     FROZEN ENTREES  [] The Good Kitchen meals  all varieties [ e.g. Chili Lime Chicken Over Riced Cauliflower]  [] Premium Paleo  Not Sylvester Momma's Meatloaf  [] Primal Kitchen  Chicken Pesto + Steak Fajitas w/ Peppers & Onions  [] Eating Well Frozen Entrees  Butter Chicken Masala, Steak Carne Asada, Creamy Pesto Chicken, Chicken + Wild Rice Stroganoff, Yellow Haynes Chicken, Sun-dried Tomato Chicken, Chicken Lo Mein  [] Realgood Entree Bowls  Jordanian Inspired Beef Bowl over Riced Cauliflower, Chicken Burrito Bowl   [] Great Karma Coconut [place]  [] Rhona's  [place] Massimo with Black Beans, Vegetable Lasagna  [] Kashi Mayan [place] Bake  [] Healthy Choice  Simply Steamers Chicken Fried Rice  [] Basil Pesto Chicken & Mosotho Style Pork Power Bowls  [] Tattooed   Enchilada Bowl  [] Dolly Farms  Spicy Black Bean Burgers    FROZEN PIZZAS  [] Cauli'flour Foods  Cauliflower Pizza Crusts  [] Outer Aisle  Cauliflower Crust  [] Rhona's  Veggie Crust Cheese Pizza  [] Quest Pizza     VEGETARIAN PRODUCTS  [] Beyond Meat  ground 'meat' + grilled 'chicken' strips  [] Tofurkey  Original Italian Sausage + Original Tempeh  [] Gardein  Beefless Ground + Meatless Meatballs  [] Dolly Farms Grillers  Original Burger, Crumbles, Meatballs    ICE CREAMS + FROZEN DESSERTS  [] Halo Top  regular + keto series, pops  [] Dino   ice cream + dessert sandwiches  [] Enlightened  ice cream + bars  [] Nightfood  ice cream  [] Realgood  ice cream  [] Arctic Zero Fit  frozen pint  [] The Frozen Farmer  sorbets  [] Wholly Rollies  Protein Balls, all varieties  [] Dream Pops  Coconut Latte    FROZEN BREAKFASTS  [] Realgood  Breakfast Sandwiches on Cauliflower Cheesy Bread  [] Rebel  ice cream + dessert sandwiches  [] Enlightened  ice cream + bars  [] Nightfood  ice cream  [] Realgood  ice cream  [] Arctic Zero Fit  frozen pint  [] The Frozen Farmer  sorbets  [] Wholly Rollies  Protein Balls, all varieties  [] Dream Pops  Coconut Latte    BREADS/BUNS/WRAPS  [] Giancarlo Bread: All Types - In Freezer Section   [] Flat Out Light Wraps - All Varieties   [] Flat Out Protein Up Carb Down Flat Bread   [] Kontos Whole Wheat Pocket Rosana   [] Davi % Whole Wheat Tortillas   [] LaTortSpunkmobile Factory Tortillas - Smart & Delicious; 50 or 80-calorie   [] Nature's Own 100% Whole Wheat Bread   [] Orowheat Healthful - 100% Whole Wheat Slice Bread and [place] Thins   [] Orowheat Healthful - Whole Wheat Nuts & Grain Bread; Flax & Seed Bread   [] Pepperidge Farm Natural Whole Grain 15 Bread   [] Pepperidge Farm Natural Whole Grain English Muffin - 100% Whole Wheat   [] Pepperidge Farm Very Thin 100% Whole Wheat   [] Dorinda Duron 45 Calories and Delightful   [] Della 100% Whole Wheat Thin-Sliced Bagels and English Muffins   [] Western Bagel: Perfect 10     GLUTEN FREE  [] Juan A's Gluten Free Bread   [] Knox Bakehouse 7-Grain Gluten Free Bread     LEGUME PASTA   [] Explore Asian Organic Black Bean Spaghetti   [] Modern Table   [] Tolerant Foods       NUT BUTTERS & JELLIES    NUT BUTTERS   [] Better'n Chocolate: Coconut Chocolate Peanut Butter Spread   [] Better'n Peanut Butter - All Types   [] Earth Balance Coconut and Peanut Spread   [] Phuc's Nut [place]   [] MaraNatha: All Natural Roasted Cashew Butter - [place] or Creamy   [] MaraNatha: Roasted  Peanut Butter   [] Nuts 'N More Peanut [place] - All Flavors   [] PB2 Powder - Original or Chocolate   [] Skippy Natural - Creamy, Super Chunk   [] Smart Balance Peanut Butter - [place] or Creamy   [] Peanut Butter & Company:   [] Smooth , Crunch Time, The Heat Is On, Old Fashioned Smooth, Mighty Nut- Powdered Peanut Butter, Squeeze Pack   [] Smucker's Natural Peanut Butter - [place] or Creamy   [] Sunbutter Nut Butter   [] Wild Friends Protein Peanut Butter/[place] o Butter - Vanilla or Chocolate     JELLIES  o Polaner's All Fruit   o Clearly Organic Best Choice: Strawberry Fruit Spread       SNACKS    BARS  [] Kashi Bars - Chewy or Crunchy; Honey [place] o Flax or Peanut Butter   [] KIND Bars - 5 Grams of Sugar or Less   [] KIND Protein Bars - Strong and KIND   [] Nature Valley Protein Bar - All Varieties   [] Nature Valley Roasted Nut Crunch - [place] Crunch; Peanut Crunch   [] [facility] [place] Simple Nut Bar - Roasted Peanut & Honey   [] [facility] Simple Nut Bar - [place], Cashew & Sea Salt   [] [facility] Nut Ritchie Bar - Salted Caramel Peanut   [] Think Thin Protein Bars   [] Quest Bars, Power Crunch Bars, Pure Protein Bars     BEEF JERKY - NITRATE FREE   [] Game On   [] Grass Run Farms   [] Krave   [] Ostrim   [] Perky Jerky   [] Primal Strips Meatless Vegan Jerky   [] [place]     CHIPS   [] Beanitos Chips   [] Fruit Crisps - e.g. Brother's-All-Natural, Bare Fruit, Yoga Chips   [] Margareth's Soy Crisps: 1.3 ounce bag   [] Quest Protein Chips   [] Wasa Whole Wheat Crisp Bread     CRACKERS  [] Chiquis's Gone Crackers   [] Nabisco Triscuit: Regular and Thin Crisp Crackers   [] Vans Say Cheese Crackers (G-F)     POPCORN/NUTS   [] Jc Dotson's Smart Pop Popcorn - Single Serving   [] 100-Calorie Pack of Nuts - All Varieties     PROTEIN POWDERS & DRINKS  []  Protein -  Whey Protein Powder   [] Garden of Life Raw Protein Powder   [] Iconic Ready-To-Drink Protein Drink   [] Hicks One Protein  Powder   [] VegaSport Protein Powder     SALSA/HUMMUS/DIPS   [] Eat Well Embrace Life: Zesty Sriracha Carrot o Hummus   [] Pre-Portioned Guacamole Packs   [] Garcia's   [] Tostitos Restaurant Style Salsa       SOUPS   [] Rhona's Organic Soups - Lentil, Vegetable, Split Pea, Low-Sodium     CANNED GOODS   [] 100% Pure Pumpkin   [] BlueRunner Creole Cream-Style Red Beans or Navy Beans   [] Cajun Power Chicken Gumbo Base   [] Chicken of the Sea Claude Burke   [] Roselia Fresh Cut Sliced Beets   [] Hormel Breast of Chicken in Water   [] LeSuer Tender Baby Whole Carrots   [] Norm Tabasco [place] Starter   [] Pérezist: Chunk Lite Tuna in Water, Gourmet Select Pouches   [] Pérezist: Yellowfin Tuna Fillets   [] Trappey's: Kidney, Butter, Yi, Black Eye, Field, and Black Beans   [] SHELLY Louis's Turnip Greens or Andry Spinach     CONDIMENTS/ SAUCES/SPREADS/ SPICES  [] Jacobo Gutierrez's Magic Seasonings - Regular or Salt Free   [] Quan Aaron's Sauces - All Flavors   [] Laughing Cow Light - All Flavors   [] Dash Salt-Free Marinade - All Flavors   [] Lavon & Valentina's: Heart Smart Pasta Sauces   [] Tabasco     SALAD DRESSINGS  [] Opal's Naturals: Lite Honey Mustard   [] Jaycob's Own: Lighten Up Salad Dressing - All Varieties   [] OPA Greek Yogurt Dressings - Ranch, Blue o Cheese, Caesar, Feta Dill     SWEETENERS  [] Sweet Lee Center Sweetener   [] Swerve   [] Truvia     BEVERAGES  [] Coconut Water   [] Crystal Light PURE - All Flavors   [] Honest Tea: Just Green Tea, Unsweetened   [] Kombucha Tea   [] [place]   [] [place] Sisters Bloody Chiquis Mix   [] Metromint - Zero-Sugar; All Natural Flavored   [] Nathaniel - Plain or Flavored   [] Gissell Torres   [] Steaz - Zero-Sugar, All-Natural, Sparkling Tea   [] Tea Bags: Any Brand - e.g. Panchito, Yogi, Tazzo, Celestial   [] V8 100% Vegetable Juice   [] Vitamin Water Zero   [] Water   [] Zevia - Stevia Sweetened Soft Drink     BEER/ANTONINO/LIQUORS  []Cox's Premier Light 64 Calories   [] Bud  Select - 55 Calories   [] [place] Sisters Bloody Chiquis Mix   [] Downs Genuine Draft - 64 Calories   [] Red or White Wine - All Varieties     CEREALS: HOT/COLD   [] Elizabeth's Giovaniffin's Original Cereal  [] Filomena's Mill Oat Bran Hot Cereal - Cracked Wheat, Multi-Grain  [] Kashi GoLean Cereal  [] Kashi GoLean Hot Cereal packets - Vanilla; Honey Cinnamon  [] Moon's Special K Protein Cereal  [] Eleni's Steel Cut Mongolian Oatmeal  [] Nature's Path Smart Bran  [] Lutheran Instant Oatmeal packet, Original  [] Lutheran Old Fashioned Lutheran Oats  [] P[employer] Studios Dave's Whole Wheat & Flaxseed Original Cereal       150 mins a week exercise  FIBER   Lean proteins   Good fats (plant)   Whole grains, complex carbs

## 2022-11-10 ENCOUNTER — PATIENT MESSAGE (OUTPATIENT)
Dept: PRIMARY CARE CLINIC | Facility: CLINIC | Age: 52
End: 2022-11-10
Payer: COMMERCIAL

## 2023-01-18 ENCOUNTER — OFFICE VISIT (OUTPATIENT)
Dept: OTOLARYNGOLOGY | Facility: CLINIC | Age: 53
End: 2023-01-18
Payer: COMMERCIAL

## 2023-01-18 DIAGNOSIS — R22.1 NECK MASS: Primary | ICD-10-CM

## 2023-01-18 PROCEDURE — 99999 PR PBB SHADOW E&M-EST. PATIENT-LVL III: CPT | Mod: PBBFAC,,, | Performed by: ORTHOPAEDIC SURGERY

## 2023-01-18 PROCEDURE — 1159F MED LIST DOCD IN RCRD: CPT | Mod: CPTII,S$GLB,, | Performed by: ORTHOPAEDIC SURGERY

## 2023-01-18 PROCEDURE — 99213 OFFICE O/P EST LOW 20 MIN: CPT | Mod: S$GLB,,, | Performed by: ORTHOPAEDIC SURGERY

## 2023-01-18 PROCEDURE — 99213 PR OFFICE/OUTPT VISIT, EST, LEVL III, 20-29 MIN: ICD-10-PCS | Mod: S$GLB,,, | Performed by: ORTHOPAEDIC SURGERY

## 2023-01-18 PROCEDURE — 1159F PR MEDICATION LIST DOCUMENTED IN MEDICAL RECORD: ICD-10-PCS | Mod: CPTII,S$GLB,, | Performed by: ORTHOPAEDIC SURGERY

## 2023-01-18 PROCEDURE — 99999 PR PBB SHADOW E&M-EST. PATIENT-LVL III: ICD-10-PCS | Mod: PBBFAC,,, | Performed by: ORTHOPAEDIC SURGERY

## 2023-01-18 NOTE — PROGRESS NOTES
Subjective:      Patient ID: Altagracia Islas is a 52 y.o. female.    Chief Complaint: Other (Lump in throat near thyroid )    Patient is a very pleasant 52 year old female seen today for evaluation of swelling of her neck over her sternum.  She feels that she may also have some asymmetry in her clavicles.  She has seen Dr. Hayden in the past, and has had a normal scope.  She felt that this lesion has increased in size over the last year.  She had a scope at the time of her visit, normal exam.  She has noted coarseness to her voice at times.  She sometimes has a globus sensation.  She does not smoke.        Review of Systems   HENT:  Negative for sore throat and trouble swallowing.    Musculoskeletal:  Negative for neck pain and neck stiffness.     Objective:       Physical Exam  Constitutional:       General: She is not in acute distress.     Appearance: She is well-developed.   HENT:      Head: Normocephalic and atraumatic.      Right Ear: Tympanic membrane, ear canal and external ear normal.      Left Ear: Tympanic membrane, ear canal and external ear normal.      Nose: Nose normal. No nasal deformity, septal deviation, mucosal edema or rhinorrhea.      Right Sinus: No maxillary sinus tenderness or frontal sinus tenderness.      Left Sinus: No maxillary sinus tenderness or frontal sinus tenderness.      Mouth/Throat:      Mouth: Mucous membranes are not pale and not dry.      Dentition: No dental caries.      Pharynx: Uvula midline. No oropharyngeal exudate or posterior oropharyngeal erythema.   Eyes:      General: Lids are normal. No scleral icterus.     Extraocular Movements:      Right eye: Normal extraocular motion and no nystagmus.      Left eye: Normal extraocular motion and no nystagmus.      Conjunctiva/sclera: Conjunctivae normal.      Right eye: Right conjunctiva is not injected. No chemosis.     Left eye: Left conjunctiva is not injected. No chemosis.     Pupils: Pupils are equal, round, and reactive to  light.   Neck:      Thyroid: No thyroid mass or thyromegaly.      Trachea: Trachea and phonation normal. No tracheal tenderness or tracheal deviation.      Comments: Normal exam, fatty tissue over sternal notch, soft, no discrete mass palpated  Pulmonary:      Effort: Pulmonary effort is normal. No respiratory distress.      Breath sounds: No stridor.   Abdominal:      General: There is no distension.   Lymphadenopathy:      Head:      Right side of head: No submental, submandibular, preauricular, posterior auricular or occipital adenopathy.      Left side of head: No submental, submandibular, preauricular, posterior auricular or occipital adenopathy.      Cervical: No cervical adenopathy.   Skin:     General: Skin is warm and dry.      Findings: No erythema or rash.   Neurological:      Mental Status: She is alert and oriented to person, place, and time.      Cranial Nerves: No cranial nerve deficit.   Psychiatric:         Behavior: Behavior normal.         US THYROID:  FINDINGS:  The right thyroid lobe measures 5.2 x 1.6 x 1.6 cm, and the left thyroid lobe measures 3.9 x 1.8 x 1.4 cm. The isthmus measures 5 mm in thickness.     There is a 3 mm colloid cyst within the inferior right thyroid gland.     The adjacent soft tissues are normal.     Impression:     1.  There is asymmetry to the size of the thyroid lobes, left right greater than left, without a definite focal solid mass seen.  3 mm colloid cyst in the inferior right thyroid gland noted.     2.  Otherwise, normal study    Assessment:       1. Neck mass        Plan:     Neck mass  -     US Soft Tissue Head Neck Thyroid; Future; Expected date: 01/18/2023    Patient has concern regarding tissue over sternal notch.  Discussed that exam is most consistent with adipose tissue, and she is comfortable with that explanation but would like imaging to compare to previous.  US ordered, will call with results.

## 2023-01-24 ENCOUNTER — HOSPITAL ENCOUNTER (OUTPATIENT)
Dept: RADIOLOGY | Facility: HOSPITAL | Age: 53
Discharge: HOME | End: 2023-01-24
Attending: ORTHOPAEDIC SURGERY
Payer: COMMERCIAL

## 2023-01-24 DIAGNOSIS — R22.1 NECK MASS: ICD-10-CM

## 2023-01-24 PROCEDURE — 76536 US SOFT TISSUE HEAD NECK THYROID: ICD-10-PCS | Mod: 26,,, | Performed by: RADIOLOGY

## 2023-01-24 PROCEDURE — 76536 US EXAM OF HEAD AND NECK: CPT | Mod: TC

## 2023-01-24 PROCEDURE — 76536 US EXAM OF HEAD AND NECK: CPT | Mod: 26,,, | Performed by: RADIOLOGY

## 2023-07-12 ENCOUNTER — TELEPHONE (OUTPATIENT)
Dept: FAMILY MEDICINE | Facility: CLINIC | Age: 53
End: 2023-07-12
Payer: COMMERCIAL

## 2023-07-12 NOTE — TELEPHONE ENCOUNTER
----- Message from Isabela Austin sent at 7/12/2023 12:11 PM CDT -----  Contact: Altagracia Howard is suffering from migraines and called in to schedule an appt. Wants to be seen before [facility] 1st available 7/17. Please call her back at [phone].    Thanks  TS

## 2023-07-12 NOTE — TELEPHONE ENCOUNTER
----- Message from Isabela Austin sent at 7/12/2023 12:11 PM CDT -----  Contact: Altagracia Howard is suffering from migraines and called in to schedule an appt. Wants to be seen before Daily[facility] 1st available 7/17. Please call her back at [phone].    Thanks  TS

## 2023-07-13 ENCOUNTER — OFFICE VISIT (OUTPATIENT)
Dept: FAMILY MEDICINE | Facility: CLINIC | Age: 53
End: 2023-07-13
Payer: COMMERCIAL

## 2023-07-13 VITALS
HEART RATE: 87 BPM | RESPIRATION RATE: 18 BRPM | DIASTOLIC BLOOD PRESSURE: 92 MMHG | HEIGHT: 61 IN | OXYGEN SATURATION: 97 % | WEIGHT: 175.5 LBS | TEMPERATURE: 99 F | BODY MASS INDEX: 33.14 KG/M2 | SYSTOLIC BLOOD PRESSURE: 124 MMHG

## 2023-07-13 DIAGNOSIS — R09.81 SINUS CONGESTION: ICD-10-CM

## 2023-07-13 DIAGNOSIS — G43.911 INTRACTABLE MIGRAINE WITH STATUS MIGRAINOSUS, UNSPECIFIED MIGRAINE TYPE: ICD-10-CM

## 2023-07-13 DIAGNOSIS — Z00.00 ROUTINE ADULT HEALTH MAINTENANCE: ICD-10-CM

## 2023-07-13 DIAGNOSIS — Z76.89 ESTABLISHING CARE WITH NEW DOCTOR, ENCOUNTER FOR: Primary | ICD-10-CM

## 2023-07-13 PROCEDURE — 3074F SYST BP LT 130 MM HG: CPT | Mod: CPTII,S$GLB,, | Performed by: NURSE PRACTITIONER

## 2023-07-13 PROCEDURE — 1160F PR REVIEW ALL MEDS BY PRESCRIBER/CLIN PHARMACIST DOCUMENTED: ICD-10-PCS | Mod: CPTII,S$GLB,, | Performed by: NURSE PRACTITIONER

## 2023-07-13 PROCEDURE — 3074F PR MOST RECENT SYSTOLIC BLOOD PRESSURE < 130 MM HG: ICD-10-PCS | Mod: CPTII,S$GLB,, | Performed by: NURSE PRACTITIONER

## 2023-07-13 PROCEDURE — 1159F PR MEDICATION LIST DOCUMENTED IN MEDICAL RECORD: ICD-10-PCS | Mod: CPTII,S$GLB,, | Performed by: NURSE PRACTITIONER

## 2023-07-13 PROCEDURE — 3080F PR MOST RECENT DIASTOLIC BLOOD PRESSURE >= 90 MM HG: ICD-10-PCS | Mod: CPTII,S$GLB,, | Performed by: NURSE PRACTITIONER

## 2023-07-13 PROCEDURE — 3008F PR BODY MASS INDEX (BMI) DOCUMENTED: ICD-10-PCS | Mod: CPTII,S$GLB,, | Performed by: NURSE PRACTITIONER

## 2023-07-13 PROCEDURE — 1160F RVW MEDS BY RX/DR IN RCRD: CPT | Mod: CPTII,S$GLB,, | Performed by: NURSE PRACTITIONER

## 2023-07-13 PROCEDURE — 99999 PR PBB SHADOW E&M-EST. PATIENT-LVL V: CPT | Mod: PBBFAC,,, | Performed by: NURSE PRACTITIONER

## 2023-07-13 PROCEDURE — 3080F DIAST BP >= 90 MM HG: CPT | Mod: CPTII,S$GLB,, | Performed by: NURSE PRACTITIONER

## 2023-07-13 PROCEDURE — 99204 PR OFFICE/OUTPT VISIT, NEW, LEVL IV, 45-59 MIN: ICD-10-PCS | Mod: S$GLB,,, | Performed by: NURSE PRACTITIONER

## 2023-07-13 PROCEDURE — 3008F BODY MASS INDEX DOCD: CPT | Mod: CPTII,S$GLB,, | Performed by: NURSE PRACTITIONER

## 2023-07-13 PROCEDURE — 1159F MED LIST DOCD IN RCRD: CPT | Mod: CPTII,S$GLB,, | Performed by: NURSE PRACTITIONER

## 2023-07-13 PROCEDURE — 99999 PR PBB SHADOW E&M-EST. PATIENT-LVL V: ICD-10-PCS | Mod: PBBFAC,,, | Performed by: NURSE PRACTITIONER

## 2023-07-13 PROCEDURE — 99204 OFFICE O/P NEW MOD 45 MIN: CPT | Mod: S$GLB,,, | Performed by: NURSE PRACTITIONER

## 2023-07-13 RX ORDER — OXYCODONE AND ACETAMINOPHEN 7.5; 325 MG/1; MG/1
1 TABLET ORAL EVERY 6 HOURS PRN
Qty: 10 TABLET | Refills: 0 | Status: SHIPPED | OUTPATIENT
Start: 2023-07-13

## 2023-07-13 RX ORDER — CYCLOBENZAPRINE HCL 10 MG
10 TABLET ORAL 3 TIMES DAILY
Qty: 10 TABLET | Refills: 0 | Status: SHIPPED | OUTPATIENT
Start: 2023-07-13 | End: 2024-01-09 | Stop reason: SDUPTHER

## 2023-07-13 RX ORDER — DEXBROMPHENIRAMINE MALEATE, PHENYLEPHRINE HYDROCHLORIDE 2; 7.5 MG/1; MG/1
1 TABLET ORAL EVERY 6 HOURS PRN
Qty: 30 TABLET | Refills: 2 | Status: SHIPPED | OUTPATIENT
Start: 2023-07-13

## 2023-07-13 RX ORDER — METHYLPREDNISOLONE 4 MG/1
TABLET ORAL
Qty: 1 EACH | Refills: 0 | Status: SHIPPED | OUTPATIENT
Start: 2023-07-13

## 2023-07-13 NOTE — PROGRESS NOTES
Altagracia Islas  2023  [id number]    Primary Doctor Atiya  Patient Care Team:  Primary Doctor No as PCP - General  Amberly Harris, NP as Nurse Practitioner (Family Medicine)          Visit Type:an urgent visit for a new problem    Chief Complaint:  Chief Complaint   Patient presents with    Migraine       History of Present Illness:   53 year old female presents establishing care.  Pt has a c/o migraine for the past four days. Pt states she has a routine she follows but this time the pain was unrelieved.  Pt states she takes percocet for her migraines and she took the last one she had last night wit mild relief. PT also reports sinus congestions which she thinks triggered the migraine.   Pt was seeing pain management but stopped because her cardiologist was refilling her narcotics. Pt is requesting to resume pain management.   It was explained to the patient this is a one time refill. I will not approve any narcotic refills in the future. Pt verbalized understanding.     History:  Past Medical History:   Diagnosis Date    Heart attack     Hx of heart artery stent     Migraine     Skin abnormalities      Past Surgical History:   Procedure Laterality Date    BREAST SURGERY       SECTION      HYSTERECTOMY      paritial     History reviewed. No pertinent family history.  Social History     Socioeconomic History    Marital status:    Tobacco Use    Smoking status: Never    Smokeless tobacco: Never   Substance and Sexual Activity    Alcohol use: No    Drug use: No     There is no problem list on file for this patient.    Review of patient's allergies indicates:  No Known Allergies    The following were reviewed at this visit: active problem list, medication list, allergies, family history, social history, and health maintenance.    Medications:  Current Outpatient Medications on File Prior to Visit   Medication Sig Dispense Refill    aspirin (ECOTRIN) 81 MG EC tablet Take 81 mg by mouth.       propranolol (INDERAL) 20 MG tablet propranolol      triamcinolone acetonide 0.1% (KENALOG) 0.1 % ointment       [DISCONTINUED] cyclobenzaprine (FLEXERIL) 10 MG tablet       [DISCONTINUED] oxyCODONE-acetaminophen (PERCOCET) 7.5-325 mg per tablet       benzonatate (TESSALON PERLES) 100 MG capsule Take 2 capsules (200 mg total) by mouth 3 (three) times daily as needed for Cough. (Patient not taking: Reported on 1/18/2023) 30 capsule 0    fluticasone (FLONASE) 50 mcg/actuation nasal spray fluticasone 50 mcg/actuation nasal spray,suspension   Spray 2 sprays every day by intranasal route.      ketoconazole (NIZORAL) 2 % cream APPLY ON THE SKIN BID PRN.  USE ON THE FACE      loratadine (CLARITIN) 10 mg tablet Take 1 tablet (10 mg total) by mouth once daily. 30 tablet 0    semaglutide (OZEMPIC) 0.25 mg or 0.5 mg(2 mg/1.5 mL) pen injector Inject 0.25 mg into the skin every 7 days. (Patient not taking: Reported on 1/18/2023) 1 pen 5     No current facility-administered medications on file prior to visit.       Medications have been reviewed and reconciled with patient at this visit.  Barriers to medications reviewed with patient.    Adverse reactions to current medications reviewed with patient..    Over the counter medications reviewed and reconciled with patient.    Exam:  Wt Readings from Last 3 Encounters:   07/13/23 79.6 kg (175 lb 7.8 oz)   08/09/22 81 kg (178 lb 9.2 oz)   07/27/22 80.3 kg (177 lb)     Temp Readings from Last 3 Encounters:   07/13/23 98.6 °F (37 °C) (Oral)   07/27/22 98.4 °F (36.9 °C) (Oral)   09/01/20 98.8 °F (37.1 °C) (Temporal)     BP Readings from Last 3 Encounters:   07/13/23 (!) 124/92   08/09/22 120/82   07/27/22 139/72     Pulse Readings from Last 3 Encounters:   07/13/23 87   08/09/22 78   07/27/22 89     Body mass index is 33.16 kg/m².      Review of Systems   Constitutional:  Negative for fever.   HENT:  Positive for congestion.    Respiratory:  Negative for cough, shortness of breath and  wheezing.    Cardiovascular:  Negative for chest pain and palpitations.   Gastrointestinal:  Negative for nausea.   Neurological:  Positive for headaches. Negative for speech change and weakness.   All other systems reviewed and are negative.  Physical Exam  Vitals and nursing note reviewed.   Constitutional:       Appearance: Normal appearance. She is obese.   HENT:      Head: Normocephalic and atraumatic.      Right Ear: Tympanic membrane, ear canal and external ear normal.      Left Ear: Tympanic membrane, ear canal and external ear normal.      Nose: Congestion present.      Mouth/Throat:      Mouth: Mucous membranes are moist.      Pharynx: Oropharynx is clear.   Eyes:      Extraocular Movements: Extraocular movements intact.      Conjunctiva/sclera: Conjunctivae normal.      Pupils: Pupils are equal, round, and reactive to light.   Cardiovascular:      Rate and Rhythm: Normal rate and regular rhythm.      Pulses: Normal pulses.      Heart sounds: Normal heart sounds.   Pulmonary:      Effort: Pulmonary effort is normal.      Breath sounds: Normal breath sounds.   Abdominal:      General: Bowel sounds are normal.      Palpations: Abdomen is soft.   Musculoskeletal:         General: Normal range of motion.      Cervical back: Normal range of motion and neck supple.   Skin:     General: Skin is warm and dry.      Capillary Refill: Capillary refill takes less than 2 seconds.   Neurological:      General: No focal deficit present.      Mental Status: She is alert and oriented to person, place, and time.   Psychiatric:         Mood and Affect: Mood normal.         Behavior: Behavior normal.         Thought Content: Thought content normal.         Judgment: Judgment normal.       Laboratory Reviewed ({Yes)  Lab Results   Component Value Date    WBC 8.41 08/09/2022    HGB 12.8 08/09/2022    HCT 40.7 08/09/2022     08/09/2022    CHOL 167 08/09/2022    TRIG 49 08/09/2022    HDL 66 08/09/2022    ALT 17 08/09/2022     AST 16 08/09/2022     08/09/2022    K 3.7 08/09/2022     08/09/2022    CREATININE 0.7 08/09/2022    BUN 7 08/09/2022    CO2 25 08/09/2022    HGBA1C 5.3 08/09/2022       Altagracia was seen today for migraine.    Diagnoses and all orders for this visit:    Establishing care with new doctor, encounter for    Intractable migraine with status migrainosus, unspecified migraine type  -     oxyCODONE-acetaminophen (PERCOCET) 7.5-325 mg per tablet; Take 1 tablet by mouth every 6 (six) hours as needed for Pain.  -     Ambulatory referral/consult to Physical/Occupational Therapy; Future  -     Ambulatory referral/consult to Pain Clinic; Future  -     ubrogepant (UBRELVY) 50 mg tablet; Take one pill by mouth every 72 hours for migraine. Maximum: 200 mg per 24 hours  -     cyclobenzaprine (FLEXERIL) 10 MG tablet; Take 1 tablet (10 mg total) by mouth 3 (three) times daily.    Routine adult health maintenance  -     Mammo Digital Screening Bilat w/ Carlos; Future    Sinus congestion  -     methylPREDNISolone (MEDROL DOSEPACK) 4 mg tablet; TAKE AS DIRECTED  -     dexbrompheniramine-phenyleph (ALAHIST PE) 2-7.5 mg Tab; Take 1 tablet by mouth every 6 (six) hours as needed (sinus congestion).          Care Plan/Goals: Reviewed    Goals    None     Altagracia was seen today for migraine.    Diagnoses and all orders for this visit:    Establishing care with new doctor, encounter for    Intractable migraine with status migrainosus, unspecified migraine type  -     oxyCODONE-acetaminophen (PERCOCET) 7.5-325 mg per tablet; Take 1 tablet by mouth every 6 (six) hours as needed for Pain.  -     Ambulatory referral/consult to Physical/Occupational Therapy; Future  -     Ambulatory referral/consult to Pain Clinic; Future  -     ubrogepant (UBRELVY) 50 mg tablet; Take one pill by mouth every 72 hours for migraine. Maximum: 200 mg per 24 hours  -     cyclobenzaprine (FLEXERIL) 10 MG tablet; Take 1 tablet (10 mg total) by mouth 3 (three)  times daily.    Routine adult health maintenance  -     Mammo Digital Screening Bilat w/ Carlos; Future    Sinus congestion  -     methylPREDNISolone (MEDROL DOSEPACK) 4 mg tablet; TAKE AS DIRECTED  -     dexbrompheniramine-phenyleph (ALAHIST PE) 2-7.5 mg Tab; Take 1 tablet by mouth every 6 (six) hours as needed (sinus congestion).         Follow up: No follow-ups on file.    After visit summary was printed and given to patient upon discharge today.  Patient goals and care plan are included in After Visit Summary.

## 2023-07-18 ENCOUNTER — HOSPITAL ENCOUNTER (OUTPATIENT)
Dept: RADIOLOGY | Facility: HOSPITAL | Age: 53
Discharge: HOME | End: 2023-07-18
Attending: NURSE PRACTITIONER
Payer: COMMERCIAL

## 2023-07-18 DIAGNOSIS — Z00.00 ROUTINE ADULT HEALTH MAINTENANCE: ICD-10-CM

## 2023-07-18 PROCEDURE — 77067 SCR MAMMO BI INCL CAD: CPT | Mod: 26,,, | Performed by: RADIOLOGY

## 2023-07-18 PROCEDURE — 77067 MAMMO DIGITAL SCREENING BILAT WITH TOMO: ICD-10-PCS | Mod: 26,,, | Performed by: RADIOLOGY

## 2023-07-18 PROCEDURE — 77067 SCR MAMMO BI INCL CAD: CPT | Mod: TC,PO

## 2023-07-18 PROCEDURE — 77063 BREAST TOMOSYNTHESIS BI: CPT | Mod: 26,,, | Performed by: RADIOLOGY

## 2023-07-18 PROCEDURE — 77063 MAMMO DIGITAL SCREENING BILAT WITH TOMO: ICD-10-PCS | Mod: 26,,, | Performed by: RADIOLOGY

## 2023-07-21 ENCOUNTER — TELEPHONE (OUTPATIENT)
Dept: PAIN MEDICINE | Facility: CLINIC | Age: 53
End: 2023-07-21
Payer: COMMERCIAL

## 2023-07-24 ENCOUNTER — OFFICE VISIT (OUTPATIENT)
Dept: PAIN MEDICINE | Facility: CLINIC | Age: 53
End: 2023-07-24
Payer: COMMERCIAL

## 2023-07-24 VITALS
BODY MASS INDEX: 32.46 KG/M2 | SYSTOLIC BLOOD PRESSURE: 139 MMHG | DIASTOLIC BLOOD PRESSURE: 85 MMHG | RESPIRATION RATE: 17 BRPM | WEIGHT: 171.94 LBS | HEART RATE: 78 BPM | HEIGHT: 61 IN

## 2023-07-24 DIAGNOSIS — G43.911 INTRACTABLE MIGRAINE WITH STATUS MIGRAINOSUS, UNSPECIFIED MIGRAINE TYPE: ICD-10-CM

## 2023-07-24 PROCEDURE — 1159F PR MEDICATION LIST DOCUMENTED IN MEDICAL RECORD: ICD-10-PCS | Mod: CPTII,S$GLB,, | Performed by: ANESTHESIOLOGY

## 2023-07-24 PROCEDURE — 1159F MED LIST DOCD IN RCRD: CPT | Mod: CPTII,S$GLB,, | Performed by: ANESTHESIOLOGY

## 2023-07-24 PROCEDURE — 3075F SYST BP GE 130 - 139MM HG: CPT | Mod: CPTII,S$GLB,, | Performed by: ANESTHESIOLOGY

## 2023-07-24 PROCEDURE — 1160F RVW MEDS BY RX/DR IN RCRD: CPT | Mod: CPTII,S$GLB,, | Performed by: ANESTHESIOLOGY

## 2023-07-24 PROCEDURE — 3079F DIAST BP 80-89 MM HG: CPT | Mod: CPTII,S$GLB,, | Performed by: ANESTHESIOLOGY

## 2023-07-24 PROCEDURE — 3008F BODY MASS INDEX DOCD: CPT | Mod: CPTII,S$GLB,, | Performed by: ANESTHESIOLOGY

## 2023-07-24 PROCEDURE — 3075F PR MOST RECENT SYSTOLIC BLOOD PRESS GE 130-139MM HG: ICD-10-PCS | Mod: CPTII,S$GLB,, | Performed by: ANESTHESIOLOGY

## 2023-07-24 PROCEDURE — 99204 OFFICE O/P NEW MOD 45 MIN: CPT | Mod: S$GLB,,, | Performed by: ANESTHESIOLOGY

## 2023-07-24 PROCEDURE — 3008F PR BODY MASS INDEX (BMI) DOCUMENTED: ICD-10-PCS | Mod: CPTII,S$GLB,, | Performed by: ANESTHESIOLOGY

## 2023-07-24 PROCEDURE — 1160F PR REVIEW ALL MEDS BY PRESCRIBER/CLIN PHARMACIST DOCUMENTED: ICD-10-PCS | Mod: CPTII,S$GLB,, | Performed by: ANESTHESIOLOGY

## 2023-07-24 PROCEDURE — 99999 PR PBB SHADOW E&M-EST. PATIENT-LVL V: CPT | Mod: PBBFAC,,, | Performed by: ANESTHESIOLOGY

## 2023-07-24 PROCEDURE — 99204 PR OFFICE/OUTPT VISIT, NEW, LEVL IV, 45-59 MIN: ICD-10-PCS | Mod: S$GLB,,, | Performed by: ANESTHESIOLOGY

## 2023-07-24 PROCEDURE — 99999 PR PBB SHADOW E&M-EST. PATIENT-LVL V: ICD-10-PCS | Mod: PBBFAC,,, | Performed by: ANESTHESIOLOGY

## 2023-07-24 PROCEDURE — 3079F PR MOST RECENT DIASTOLIC BLOOD PRESSURE 80-89 MM HG: ICD-10-PCS | Mod: CPTII,S$GLB,, | Performed by: ANESTHESIOLOGY

## 2023-07-24 NOTE — PROGRESS NOTES
"New Patient Chronic Pain Note (Initial Visit)    Referring Physician: Amberly Harris,*    PCP: Primary Doctor No    Chief Complaint:   Chief Complaint   Patient presents with    Migraine        SUBJECTIVE:    Altagracia Islas is a 53 y.o. female with past medical history significant for migraine HA, CAD s/p MI s/p PTCA who presents to the clinic for the evaluation of headaches.  Patient reports diagnosis of migraines exceeding 20 years in duration.  Patient reports her migraines occur at least 2-3 days per week and lasts in between 3-5 days in duration.  She reports greater than 15 days per month with migraine headaches.  Pain is typically in the frontotemporal, retro-orbital and periauricular territories.  Patient reports when present headaches are typically unilateral but can vary from right to left.  Pain today is rated 0/10 but at its worse is a 10/10.  Pain is described as dull and throbbing and pounding in nature.  Headaches are compounded by diplopia, nausea, photophobia, phonophobia and neck stiffness.  Headaches can be brought on by weather or stress.  Pain is marginally improved with rest as well as taking propranolol 3 times daily.  Patient has failed to have improvement with Imitrex and Topamax in the past.  Of note patient reports complex prior history of having trifecta of dizziness, migraines and chest pain with echo and stress test unremarkable.  Patient had a heart catheterization demonstrating a blood clot in the right coronary artery." Since that time patient reports she has been advised to avoid migraine medications and birth control, which may have been contributing to her symptoms as well.  Today she denies more distal radiculopathy into the upper extremities or hands, compromise in hand  strength or dexterity.  Of note patient has not had a follow-up with Neurology since 2019 during her cardiac workup.  Patient has performed physician directed cervical traction and strengthening " exercises over the last 8 weeks without meaningful improvement in her headaches or pain.    Patient denies night fever/night sweats, urinary incontinence, bowel incontinence, significant weight loss, significant motor weakness, and loss of sensations.    Pain Disability Index Review:     Last 3 PDI Scores 2023   Pain Disability Index (PDI) 48       Non-Pharmacologic Treatments:  Physical Therapy/Home Exercise: yes  Ice/Heat:yes  TENS: no  Acupuncture: no  Massage: no  Chiropractic: no    Other: no      Pain Medications:  - Opioids: Percocet (Oxycodone/Acetaminophen)  - Adjuvant Medications: Cyclobenzaprine (Flexeril)  - Anti-Coagulants: Aspirin    Pain Procedures:   None    Past Medical History:   Diagnosis Date    Heart attack     Hx of heart artery stent     Migraine     Skin abnormalities      Past Surgical History:   Procedure Laterality Date    BREAST SURGERY       SECTION      HYSTERECTOMY      paritial    TOTAL REDUCTION MAMMOPLASTY       Review of patient's allergies indicates:   Allergen Reactions    Latex      Other reaction(s): Rash, facial       Current Outpatient Medications   Medication Sig    aspirin (ECOTRIN) 81 MG EC tablet Take 81 mg by mouth.    benzonatate (TESSALON PERLES) 100 MG capsule Take 2 capsules (200 mg total) by mouth 3 (three) times daily as needed for Cough.    cyclobenzaprine (FLEXERIL) 10 MG tablet Take 1 tablet (10 mg total) by mouth 3 (three) times daily.    dexbrompheniramine-phenyleph (ALAHIST PE) 2-7.5 mg Tab Take 1 tablet by mouth every 6 (six) hours as needed (sinus congestion).    fluticasone (FLONASE) 50 mcg/actuation nasal spray fluticasone 50 mcg/actuation nasal spray,suspension   Spray 2 sprays every day by intranasal route.    ketoconazole (NIZORAL) 2 % cream APPLY ON THE SKIN BID PRN.  USE ON THE FACE    methylPREDNISolone (MEDROL DOSEPACK) 4 mg tablet TAKE AS DIRECTED    oxyCODONE-acetaminophen (PERCOCET) 7.5-325 mg per tablet Take 1 tablet by mouth every  "6 (six) hours as needed for Pain.    propranolol (INDERAL) 20 MG tablet propranolol    semaglutide (OZEMPIC) 0.25 mg or 0.5 mg(2 mg/1.5 mL) pen injector Inject 0.25 mg into the skin every 7 days.    triamcinolone acetonide 0.1% (KENALOG) 0.1 % ointment     ubrogepant (UBRELVY) 50 mg tablet Take one pill by mouth every 72 hours for migraine. Maximum: 200 mg per 24 hours    loratadine (CLARITIN) 10 mg tablet Take 1 tablet (10 mg total) by mouth once daily.     Current Facility-Administered Medications   Medication    onabotulinumtoxina injection 200 Units       Review of Systems     GENERAL:  No weight loss, malaise or fevers.  HEENT:   No recent changes in vision or hearing  NECK:  Negative for lumps, no difficulty with swallowing.  RESPIRATORY:  Negative for cough, wheezing or shortness of breath, patient denies any recent URI.  CARDIOVASCULAR:  Negative for chest pain or palpitations.  GI:  Negative for abdominal discomfort, blood in stools or black stools or change in bowel habits.  MUSCULOSKELETAL:  See HPI.  SKIN:  Negative for lesions, rash, and itching.  PSYCH:  No mood disorder or recent psychosocial stressors.   HEMATOLOGY/LYMPHOLOGY:  Negative for prolonged bleeding, bruising easily or swollen nodes.    NEURO:   No history of syncope, paralysis, seizures or tremors.  All other reviewed and negative other than HPI.    OBJECTIVE:    /85   Pulse 78   Resp 17   Ht 5' 1" (1.549 m)   Wt 78 kg (171 lb 15.3 oz)   BMI 32.49 kg/m²       Physical Exam    GENERAL: Well appearing, in no acute distress, alert and oriented x3.  PSYCH:  Mood and affect appropriate.  SKIN: Skin color, texture, turgor normal, no rashes or lesions.  HEAD/FACE:  Normocephalic, atraumatic. Cranial nerves grossly intact.    NECK: No pain to palpation over the cervical paraspinous muscles. Spurling Negative. No pain with neck flexion, extension, or lateral flexion.   Normaltesting biceps, triceps and brachioradialis bilaterally.  "   NegativeHoffmann's bilaterally.    5/5 strength testing deltoid, biceps, triceps, wrist extensor, wrist flexor and ulnar intrinsics bilaterally.    Normal  strength bilaterally    CV: RRR with palpation of the radial artery.  PULM: No evidence of respiratory difficulty, symmetric chest rise.  GI:  Soft and non-tender.    NEURO: Bilateral upper and lower extremity coordination and muscle stretch reflexes are physiologic and symmetric. No loss of sensation is noted.  GAIT: normal.    Imaging:   None      ASSESSMENT: 53 y.o. year old female with head pain, consistent with     1. Intractable migraine with status migrainosus, unspecified migraine type  Ambulatory referral/consult to Pain Clinic    onabotulinumtoxina injection 200 Units    Prior authorization Order    Ambulatory referral/consult to Neurology            PLAN:   - Interventions:  Patient is a candidate for Botox treatment secondary to greater than 15 migraine headaches per month, lasting 3-5 days in duration with failed response to agents including nonsteroidal anti-inflammatory medications, Imitrex, Topamax and propranolol.    Pre-Op Diagnosis: chronic intractable migraine    Post-Op Diagnosis:  Same     Anticipated Botox mapping:        - muscles:  5 units bilaterally, 10 units total  -Procerus muscle: 5 units  -Frontalis muscle:  4 sites, 5 units each, 20 units total  -Temporalis muscle: 5 units , 4 sites bilaterally, 20 units total  -Occipitalis muscle: 5 units, 3 sites bilaterally, 30 units total  -Trapezius muscles:  5 units, 3 sites bilaterally, 30 units total  -cervical paraspinous:  5 units, 2 sites bilaterally, 20 units total    Total: 135 Units  Waste: 65 Units      - Anticoagulation use: Yes aspirin     report:  Reviewed and consistent with medication use as prescribed.    - Medications:  -continue propranolol, Ubrelvy per Ms. Kimberly NP    - Therapy:   -We discussed continuing physician directed at home physical therapy to  help manage the patient/s painful condition. The patient was counseled that muscle strengthening will improve the long term prognosis in regards to pain and may also help increase range of motion and mobility. .    - Consults/Referrals:  -Neurology: est care; migraine HA    - Follow up visit: return to clinic in 4-6 weeks      The above plan and management options were discussed at length with patient. Patient is in agreement with the above and verbalized understanding.    - I discussed the goals of interventional chronic pain management with the patient on today's visit. We discussed a multimodal and systematic approach to pain.  This includes diagnostic and therapeutic injections, adjuvant pharmacologic treatment, physical therapy, and at times psychiatry.  I emphasized the importance of regular exercise, core strengthening and stretching, diet and weight loss as a cornerstone of long-term pain management.    - This condition does not require this patient to take time off of work, and the primary goal of our Pain Management services is to improve the patient's functional capacity.  - Patient Questions: Answered all of the patient's questions regarding diagnoses, therapy, treatment and next steps        Kale Jean-Baptiste MD  Interventional Pain Management  [facility]    Disclaimer:  This note was prepared using voice recognition system and is likely to have sound alike errors that may have been overlooked even after proof reading.  Please call me with any questions

## 2023-07-25 ENCOUNTER — CLINICAL SUPPORT (OUTPATIENT)
Dept: REHABILITATION | Facility: HOSPITAL | Age: 53
End: 2023-07-25
Payer: COMMERCIAL

## 2023-07-25 ENCOUNTER — OFFICE VISIT (OUTPATIENT)
Dept: NEUROLOGY | Facility: CLINIC | Age: 53
End: 2023-07-25
Payer: COMMERCIAL

## 2023-07-25 VITALS
DIASTOLIC BLOOD PRESSURE: 80 MMHG | WEIGHT: 174.19 LBS | HEART RATE: 69 BPM | BODY MASS INDEX: 32.89 KG/M2 | HEIGHT: 61 IN | SYSTOLIC BLOOD PRESSURE: 129 MMHG

## 2023-07-25 DIAGNOSIS — G44.86 CERVICOGENIC HEADACHE: Primary | ICD-10-CM

## 2023-07-25 DIAGNOSIS — M53.82 DECREASED ROM OF INTERVERTEBRAL DISCS OF CERVICAL SPINE: ICD-10-CM

## 2023-07-25 DIAGNOSIS — G43.911 INTRACTABLE MIGRAINE WITH STATUS MIGRAINOSUS, UNSPECIFIED MIGRAINE TYPE: ICD-10-CM

## 2023-07-25 DIAGNOSIS — G47.00 INSOMNIA, UNSPECIFIED TYPE: Primary | ICD-10-CM

## 2023-07-25 DIAGNOSIS — M62.50 MUSCLE WASTING AND ATROPHY, NOT ELSEWHERE CLASSIFIED, UNSPECIFIED SITE: ICD-10-CM

## 2023-07-25 DIAGNOSIS — G43.709 CHRONIC MIGRAINE W/O AURA W/O STATUS MIGRAINOSUS, NOT INTRACTABLE: ICD-10-CM

## 2023-07-25 PROCEDURE — 99999 PR PBB SHADOW E&M-EST. PATIENT-LVL IV: CPT | Mod: PBBFAC,,, | Performed by: PSYCHIATRY & NEUROLOGY

## 2023-07-25 PROCEDURE — 3074F SYST BP LT 130 MM HG: CPT | Mod: CPTII,S$GLB,, | Performed by: PSYCHIATRY & NEUROLOGY

## 2023-07-25 PROCEDURE — 97161 PT EVAL LOW COMPLEX 20 MIN: CPT

## 2023-07-25 PROCEDURE — 3008F BODY MASS INDEX DOCD: CPT | Mod: CPTII,S$GLB,, | Performed by: PSYCHIATRY & NEUROLOGY

## 2023-07-25 PROCEDURE — 97140 MANUAL THERAPY 1/> REGIONS: CPT

## 2023-07-25 PROCEDURE — 3079F PR MOST RECENT DIASTOLIC BLOOD PRESSURE 80-89 MM HG: ICD-10-PCS | Mod: CPTII,S$GLB,, | Performed by: PSYCHIATRY & NEUROLOGY

## 2023-07-25 PROCEDURE — 97110 THERAPEUTIC EXERCISES: CPT

## 2023-07-25 PROCEDURE — 3074F PR MOST RECENT SYSTOLIC BLOOD PRESSURE < 130 MM HG: ICD-10-PCS | Mod: CPTII,S$GLB,, | Performed by: PSYCHIATRY & NEUROLOGY

## 2023-07-25 PROCEDURE — 99999 PR PBB SHADOW E&M-EST. PATIENT-LVL IV: ICD-10-PCS | Mod: PBBFAC,,, | Performed by: PSYCHIATRY & NEUROLOGY

## 2023-07-25 PROCEDURE — 99205 PR OFFICE/OUTPT VISIT, NEW, LEVL V, 60-74 MIN: ICD-10-PCS | Mod: S$GLB,,, | Performed by: PSYCHIATRY & NEUROLOGY

## 2023-07-25 PROCEDURE — 99205 OFFICE O/P NEW HI 60 MIN: CPT | Mod: S$GLB,,, | Performed by: PSYCHIATRY & NEUROLOGY

## 2023-07-25 PROCEDURE — 3008F PR BODY MASS INDEX (BMI) DOCUMENTED: ICD-10-PCS | Mod: CPTII,S$GLB,, | Performed by: PSYCHIATRY & NEUROLOGY

## 2023-07-25 PROCEDURE — 3079F DIAST BP 80-89 MM HG: CPT | Mod: CPTII,S$GLB,, | Performed by: PSYCHIATRY & NEUROLOGY

## 2023-07-25 RX ORDER — ZOLPIDEM TARTRATE 5 MG/1
5 TABLET ORAL NIGHTLY PRN
Qty: 10 TABLET | Refills: 0 | Status: SHIPPED | OUTPATIENT
Start: 2023-07-25 | End: 2024-01-23

## 2023-07-25 RX ORDER — GABAPENTIN 100 MG/1
100 CAPSULE ORAL 2 TIMES DAILY
Qty: 60 CAPSULE | Refills: 11 | Status: SHIPPED | OUTPATIENT
Start: 2023-07-25 | End: 2024-07-24

## 2023-07-25 RX ORDER — UBROGEPANT 100 MG/1
100 TABLET ORAL
Qty: 9 TABLET | Refills: 12 | Status: SHIPPED | OUTPATIENT
Start: 2023-07-25 | End: 2024-03-28 | Stop reason: SDUPTHER

## 2023-07-25 NOTE — PROGRESS NOTES
"Subjective     Patient ID: Altagracia Islas is a 53 y.o. female.    Chief Complaint: Intractable migraine with status migrainosus, unspecified    HPI  Works as      Robinson:   Altagracia Islas is a 53 y.o. female with past medical history significant for migraine HA, CAD s/p MI s/p PTCA who presents to the clinic for the evaluation of headaches.  Patient reports diagnosis of migraines exceeding 20 years in duration.  Patient reports her migraines occur at least 2-3 days per week and lasts in between 3-5 days in duration.  She reports greater than 15 days per month with migraine headaches.  Pain is typically in the frontotemporal, retro-orbital and periauricular territories.  Patient reports when present headaches are typically unilateral but can vary from right to left.  Pain today is rated 0/10 but at its worse is a 10/10.  Pain is described as dull and throbbing and pounding in nature.  Headaches are compounded by diplopia, nausea, photophobia, phonophobia and neck stiffness.  Headaches can be brought on by weather or stress.  Pain is marginally improved with rest as well as taking propranolol 3 times daily.  Patient has failed to have improvement with Imitrex and Topamax in the past.  Of note patient reports complex prior history of having trifecta of dizziness, migraines and chest pain with echo and stress test unremarkable.  Patient had a heart catheterization demonstrating a blood clot in the right coronary artery." Since that time patient reports she has been advised to avoid migraine medications and birth control, which may have been contributing to her symptoms as well.  Today she denies more distal radiculopathy into the upper extremities or hands, compromise in hand  strength or dexterity.  Of note patient has not had a follow-up with Neurology since 2019 during her cardiac workup.  Patient has performed physician directed cervical traction and strengthening exercises over the last 8 weeks " without meaningful improvement in her headaches or pain.    Headache history:   Age of onset -  teens, worse in last 3 months   Location - occipital, frontal   Nature of pain -  throbbing   Prodrome - no   Aura -  no   Duration of headache - > 4 hrs   Time to peak intensity - hrs   Pain scale - range of intensity -  10/10  Frequency -  2-3/m  Status Migrainosus history - yes   Time of day of most headaches- anytime     Associated symptoms with the headache:   Meningeal symptoms - photophobia, phonophobia, exercise intolerance +   Nausea/vomitting +   Nasal drainage   Visual blurriness +    Pallor/flushing  Dizziness +   Vertigo  Confusion  Impaired concentration +   Pain worsened with physical activity +   Neck pain +     Cluster headache symptoms: none     Headache Triggers: stress      Other comorbid conditions:  Anxiety - no   Motion sickness symptom - no     Treatment history:  Resolution of headache with sleep - yes   Meds tried:  Propranolol - helps   Ubrelvy - not tried   Triptans contraindicated - heart failure   Topamax - not help     Medication List with Changes/Refills   New Medications    GABAPENTIN (NEURONTIN) 100 MG CAPSULE    Take 1 capsule (100 mg total) by mouth 2 (two) times daily.       Start Date: 7/25/2023 End Date: 7/24/2024    UBROGEPANT (UBRELVY) 100 MG TABLET    Take 1 tablet (100 mg total) by mouth as needed for Migraine. If symptoms persist or return, may repeat dose after 2 hours. Maximum: 200 mg per 24 hours       Start Date: 7/25/2023 End Date: --   Current Medications    ASPIRIN (ECOTRIN) 81 MG EC TABLET    Take 81 mg by mouth.       Start Date: --        End Date: --    BENZONATATE (TESSALON PERLES) 100 MG CAPSULE    Take 2 capsules (200 mg total) by mouth 3 (three) times daily as needed for Cough.       Start Date: 7/27/2022 End Date: --    CYCLOBENZAPRINE (FLEXERIL) 10 MG TABLET    Take 1 tablet (10 mg total) by mouth 3 (three) times daily.       Start Date: 7/13/2023 End Date: --     DEXBROMPHENIRAMINE-PHENYLEPH (ALAHIST PE) 2-7.5 MG TAB    Take 1 tablet by mouth every 6 (six) hours as needed (sinus congestion).       Start Date: 7/13/2023 End Date: --    FLUTICASONE (FLONASE) 50 MCG/ACTUATION NASAL SPRAY    fluticasone 50 mcg/actuation nasal spray,suspension   Spray 2 sprays every day by intranasal route.       Start Date: --        End Date: --    KETOCONAZOLE (NIZORAL) 2 % CREAM    APPLY ON THE SKIN BID PRN.  USE ON THE FACE       Start Date: 6/9/2017  End Date: --    LORATADINE (CLARITIN) 10 MG TABLET    Take 1 tablet (10 mg total) by mouth once daily.       Start Date: 12/13/2019End Date: 9/4/2020    METHYLPREDNISOLONE (MEDROL DOSEPACK) 4 MG TABLET    TAKE AS DIRECTED       Start Date: 7/13/2023 End Date: --    OXYCODONE-ACETAMINOPHEN (PERCOCET) 7.5-325 MG PER TABLET    Take 1 tablet by mouth every 6 (six) hours as needed for Pain.       Start Date: 7/13/2023 End Date: --    PROPRANOLOL (INDERAL) 20 MG TABLET    propranolol       Start Date: --        End Date: --    SEMAGLUTIDE (OZEMPIC) 0.25 MG OR 0.5 MG(2 MG/1.5 ML) PEN INJECTOR    Inject 0.25 mg into the skin every 7 days.       Start Date: 8/9/2022  End Date: 8/9/2023    TRIAMCINOLONE ACETONIDE 0.1% (KENALOG) 0.1 % OINTMENT           Start Date: 6/9/2017  End Date: --   Discontinued Medications    UBROGEPANT (UBRELVY) 50 MG TABLET    Take one pill by mouth every 72 hours for migraine. Maximum: 200 mg per 24 hours       Start Date: 7/13/2023 End Date: 7/25/2023       Headache risk factors:   H/o TBI  - no   H/o Meningitis  - no   F/h Aneurysms  - no      Review of Systems   Constitutional: Negative.    HENT: Negative.     Eyes: Negative.    Respiratory: Negative.     Cardiovascular: Negative.    Gastrointestinal: Negative.    Endocrine: Negative.    Genitourinary: Negative.    Musculoskeletal: Negative.    Integumentary:  Negative.   Allergic/Immunologic: Negative.    Neurological: Negative.    Hematological: Negative.     Psychiatric/Behavioral:  Positive for sleep disturbance.         Objective     Physical Exam  Constitutional:       Appearance: Normal appearance.   HENT:      Head: Normocephalic and atraumatic.      Right Ear: External ear normal.      Left Ear: External ear normal.      Nose: Nose normal.      Mouth/Throat:      Mouth: Mucous membranes are moist.   Eyes:      Extraocular Movements: Extraocular movements intact.      Conjunctiva/sclera: Conjunctivae normal.      Pupils: Pupils are equal, round, and reactive to light.   Cardiovascular:      Rate and Rhythm: Normal rate.      Pulses: Normal pulses.   Pulmonary:      Effort: Pulmonary effort is normal.   Musculoskeletal:         General: Normal range of motion.      Cervical back: Normal range of motion.   Skin:     General: Skin is warm.   Neurological:      General: No focal deficit present.      Mental Status: She is alert and oriented to person, place, and time.      Cranial Nerves: No cranial nerve deficit.      Sensory: No sensory deficit.      Motor: No weakness.      Coordination: Coordination normal.      Gait: Gait normal.   Psychiatric:         Mood and Affect: Mood normal.         Behavior: Behavior normal.         Thought Content: Thought content normal.         Judgment: Judgment normal.       Headache Exam:  Optic disc is flat OU   Temples appear symmetric  No V1,V2,V3 distribution allodynia/hyperalgesia martha   No facial swelling  No gross TMJ abnormalities   No ptosis   No conj injection   No meningismus   No neck stiffness  No C spine tenderness  No Cervical facet tenderness on palpation martha   No tender points over trapezium   No supraorbital nerve exit point tenderness  No occipital nerve exit point tenderness  No auriculotemporal nerve tenderness      Assessment and Plan     1. Intractable migraine with status migrainosus, unspecified migraine type  -     Ambulatory referral/consult to Neurology  -     gabapentin (NEURONTIN) 100 MG capsule; Take 1  capsule (100 mg total) by mouth 2 (two) times daily.  Dispense: 60 capsule; Refill: 11  -     ubrogepant (UBRELVY) 100 mg tablet; Take 1 tablet (100 mg total) by mouth as needed for Migraine. If symptoms persist or return, may repeat dose after 2 hours. Maximum: 200 mg per 24 hours  Dispense: 9 tablet; Refill: 12    2. Chronic migraine w/o aura w/o status migrainosus, not intractable      1. Given long standing history of headaches with no change in character and no associated neurological symptoms, no reportable neurological symptoms in between episodes, and normal neuro exam today there is no indication for an MRI.   2. Given prescription for prophylactic medication - gabapentin. I discussed side effects of the medications. We will start at a lower dose. I asked her to stop the medication if she notices serious adverse effects like psychosis and seek immediate medical attention at an ER.   Cont propanolol  Consider mAbs  Breakthrough headache - percocet, ubrelvy   PRN ambien  Multiple alternative treatment options were offered to the patient  4. Patient education. Discussed prevention and treatment of migraine headaches. Discussed sleep hygiene, healthy diet, importance of minimizing caffeine intake to a maximum of 100-200 mg /day, importance of avoiding foods with MSG, Nutrasweet, and Aspartame.   Provided hand outs on this.   5. Refrain from over counter pain medications. Discussed medication overuse headache.   6. I urged the patient to keep a headache calender    Cont PT for neck pain     Patient verbalized understanding to plan. I answered all her questions to her satisfaction.         Follow up in about 2 months (around 9/25/2023).

## 2023-07-25 NOTE — PLAN OF CARE
[facility]   Physical Therapy Initial Evaluation        Date: 2023   Name: Altagracia Islas  Clinic Number: [id number]    Therapy Diagnosis:   Encounter Diagnoses   Name Primary?    Intractable migraine with status migrainosus, unspecified migraine type     Cervicogenic headache Yes    Decreased ROM of intervertebral discs of cervical spine     Muscle wasting and atrophy, not elsewhere classified, unspecified site      Physician: Amberly Harris,*    Physician Orders: PT Eval and Treat   Medical Diagnosis from Referral: Intractable Migraine with Status Migrainosus, Unspecified Migraine Type.   Evaluation Date: 2023  Authorization Period Expiration: 24  Plan of Care Expiration: 10/17/23  Visit # / Visits authorized:     Time In: 1:15 pm   Time Out: 2:00 pm  Total Appointment Time (timed & untimed codes): 45 minutes    Precautions: Standard    Surgery: none    Subjective   Date of onset: patient reports she has experienced migraines since she was a teenager; however, patient reports an increase in frequency and intensity of her migraines approximately 6 months ago.   History of current condition - Altagracia reports: an increase in her migraines which she is unsure as to why but think it may have to do with weather changes/allergies. Patient states her migraines begin with pain in her head and progress to blurry vision, neck tightness, upset stomach, etc. Patient states her migraines typically last for approximately 3-5 days.     Falls: none     Medical History:   Past Medical History:   Diagnosis Date    Heart attack     Hx of heart artery stent     Migraine     Skin abnormalities        Surgical History:   Altagracia Islas  has a past surgical history that includes Hysterectomy;  section; Breast surgery; and Total Reduction Mammoplasty.    Medications:   Altagracia has a current medication list which includes the following prescription(s): aspirin, benzonatate,  cyclobenzaprine, alahist pe, fluticasone propionate, gabapentin, ketoconazole, loratadine, methylprednisolone, oxycodone-acetaminophen, propranolol, ozempic, triamcinolone acetonide 0.1%, ubrelvy, and zolpidem, and the following Facility-Administered Medications: onabotulinumtoxina.    Allergies:   Review of patient's allergies indicates:   Allergen Reactions    Latex      Other reaction(s): Rash, facial        Imaging: none    Prior Therapy: none  Social History:  lives with their family  Occupation: not applicable   Prior Level of Function: Patient was independent with all ADL's.   Current Level of Function: Patient experiences quite a bit of difficulty with participation in their typical ADL's.     Pain:  Current 0/10, worst 10/10, best 0/10   Location: bilateral head  and neck   Description: Aching and Throbbing  Aggravating Factors: unknown  Easing Factors: rest    Objective       ROM  *denotes pain     Cervical ROM  (degrees) Right Left Goal   Cervical Flexion 60* 60   Cervical Extension 60 -   Cervical Sidebending 35*  (Contralateral) 35*  (Contralateral) 45   Cervical Rotation  70* (contralateral) 70* (contralateral) 70       Thoracic ROM  (Quality) Right Left Goal   Flexion  Dysfunctional Nonpainful  Funcitonal Nonpainful    Extension  Funcitonal Nonpainful  -   Rotation Funcitonal Nonpainful  Funcitonal Nonpainful  -         Strength  *denotes pain     MMT Right    Left Goal   Deep Neck Flexor Endurance Test NT 20 seconds   Shoulder Flexion 5/5 5/5 -   Shoulder Abduction 5/5 5/5 -   Elbow Flexion  5/5 5/5 -   Elbow Extension  5/5 5/5 -   Wrist Flexion  5/5 5/5 -   Wrist Extension  5/5 5/5 -   Thumb Extension 5/5 5/5 -           Intake Outcome Measure for FOTO Neck Survey    Therapist reviewed FOTO scores for Altagracia Islas on 7/25/2023.   FOTO documents entered into EPIC - see Media section.    Intake Score: 66%       TREATMENT         MANUAL THERAPY TECHNIQUES to improve pain, ROM for (15) minutes  "including:  Intervention Performed Today     Seated Thoracic HVLA x    Active Release Technique  x  Bilateral Upper Trapezius, levator scapulae, erector spinae, mulifidi, and suboccipitas    Suboccipital Release  x                 THERAPEUTIC EXERCISES to develop strength, endurance, ROM, flexibility, posture, and core stabilization for (15) minutes including:  Intervention Performed Today     Chin Tuck - supine  x  3" hold, 1x10   Scapular Retractions x  3" hold, 1x10    Upper Trapezius Stretch  x  30"x3 - bilateral    Levator Scapula Stretch  x  30"x3 - bilateral          Home Exercises and Patient Education Provided    Education provided:   - issued HEP for cervical stretching and strengthening.     Written Home Exercises Provided: Patient instructed to cont prior HEP.  Exercises were reviewed and Altagracia was able to demonstrate them prior to the end of the session.  Altagracia demonstrated good  understanding of the education provided.     See EMR under Patient Instructions for exercises provided 7/25/2023.    Assessment   Altagracia is a 53 y.o. female referred to outpatient Physical Therapy with a medical diagnosis of Intractable Migraine with Status Migrainosus, Unspecified Migraine Type. The patient presents with impairments which include impairments list: ROM and strength. Pt prognosis is Excellent due to personal factors and co-morbidities listed below. Pt will benefit from skilled outpatient Physical Therapy to address the deficits stated above and in the chart below, provide pt/family education, and to maximize pt's level of independence.     Plan of care discussed with patient: Yes  Pt's spiritual, cultural and educational needs considered and patient is agreeable to the plan of care and goals as stated below:     Anticipated Barriers for therapy: none    Medical Necessity is demonstrated by the following  History  Co-morbidities and personal factors that may impact the plan of care [x] LOW: no personal " factors / co-morbidities  [] MODERATE: 1-2 personal factors / co-morbidities  [] HIGH: 3+ personal factors / co-morbidities    Moderate / High Support Documentation: See Past Medical History     Examination  Body Structures and Functions, activity limitations and participation restrictions that may impact the plan of care [] LOW: addressing 1-2 elements  [x] MODERATE: 3+ elements  [] HIGH: 4+ elements (please support below)    Moderate / High Support Documentation: See Evaluation Above     Clinical Presentation [] LOW: stable  [x] MODERATE: Evolving  [] HIGH: Unstable     Decision Making/ Complexity Score: low       GOALS:    Short Term Goals:  6 weeks Progress      Patient will report decreased pain to <6/10 at worst on VAS to progress toward Prior Level of Function.    Patient will report improved function by a score of >75 out of 100 on FOTO.    Patient will improve AROM to 50% of stated goals in order to progress towards Prior Level of Function.    Patient will improve strength to 50% of stated goals in order to progress towards Prior Level of Function.      Long Term Goals:  12 weeks Progress     Patient will report decreased pain to <3/10 at worst on VAS to progress toward Prior Level of Function.      Patient will report improved function by a score of >90 out of 100 on FOTO.    Patient will improve ROM to stated goals to return to patient to Prior Level of Function.    Patient will improve strength to stated goals in order to return patient  to Prior Level of Function.          Plan   Plan of care Certification: 7/25/2023 to 10/17/23.    Outpatient Physical Therapy 3 times weekly for 12 weeks to include the following interventions: Electrical Stimulation  , Manual Therapy, Moist Heat/ Ice, Neuromuscular Re-ed, Therapeutic Activities, and Therapeutic Exercise.     Remberto Brennan, PT, DPT, SCS

## 2023-07-26 PROBLEM — M53.82 DECREASED ROM OF INTERVERTEBRAL DISCS OF CERVICAL SPINE: Status: ACTIVE | Noted: 2023-07-26

## 2023-07-26 PROBLEM — G44.86 CERVICOGENIC HEADACHE: Status: ACTIVE | Noted: 2023-07-26

## 2023-07-26 PROBLEM — M62.50 MUSCLE WASTING AND ATROPHY, NOT ELSEWHERE CLASSIFIED, UNSPECIFIED SITE: Status: ACTIVE | Noted: 2023-07-26

## 2023-08-20 NOTE — PROGRESS NOTES
Established Patient Chronic Pain Note     Referring Physician: Amberly Harris,*    PCP: No, Primary Doctor    Chief Complaint:   No chief complaint on file.       SUBJECTIVE:  Interval history 08/21/2023   Patient presents for Botox treatment. Patient reports diagnosis of migraines exceeding 20 years in duration.  Patient reports her migraines occur at least 2-3 days per week and lasts in between 3-5 days in duration.  She reports greater than 15 days per month with migraine headaches.  Pain is typically in the frontotemporal, retro-orbital and periauricular territories.  Patient reports when present headaches are typically unilateral but can vary from right to left.  Pain today is rated 0/10 but at its worse is a 10/10.  Pain is described as dull and throbbing and pounding in nature.  Headaches are compounded by diplopia, nausea, photophobia, phonophobia and neck stiffness.  Headaches can be brought on by weather or stress.  Pain is marginally improved with rest as well as taking propranolol 3 times daily.  Patient has failed to have improvement with Imitrex and Topamax in the past    PROCEDURE:  Informed consent was obtained and the procedure was described to the patient, a timeout was performed prior to starting the procedure.    Surgeon: Kale Jean-Baptiste MD      Pre-Op Diagnosis: chronic intractable migraine, atypical cluster headache    Post-Op Diagnosis:  Same    Procedure:  Botox injections for headache    EBL: None    Complications: None    Specimens: None    Description of Procedure:    The patient was placed in the sitting position and the areas over the planned injections were all prepped with alcohol.  botox was reconstituted in preservative free normal saline, 100 units was placed in 2 mLof saline on a 30-gauge needle.  5 Units each was placed into the following muscles:       Botox mapping:        - muscles:  5 units bilaterally, 10 units total  -Procerus muscle: 5 units  -Frontalis  "muscle:  4 sites, 5 units each, 20 units total  -Temporalis muscle: 5 units , 4 sites bilaterally, 20 units total  -Occipitalis muscle: 5 units, 3 sites bilaterally, 30 units total  -Trapezius muscles:  5 units, 3 sites bilaterally, 30 units total  -cervical paraspinous:  5 units, 2 sites bilaterally, 20 units total    Total: 135 Units  Waste: 65 Units    Patient tolerated the procedure well and bleeding was nil no Band-Aids were applied the patient was discharged in stable condition.        HPI 07/24/2023  Altagracia Islas is a 53 y.o. female with past medical history significant for migraine HA, CAD s/p MI s/p PTCA who presents to the clinic for the evaluation of headaches.  Patient reports diagnosis of migraines exceeding 20 years in duration.  Patient reports her migraines occur at least 2-3 days per week and lasts in between 3-5 days in duration.  She reports greater than 15 days per month with migraine headaches.  Pain is typically in the frontotemporal, retro-orbital and periauricular territories.  Patient reports when present headaches are typically unilateral but can vary from right to left.  Pain today is rated 0/10 but at its worse is a 10/10.  Pain is described as dull and throbbing and pounding in nature.  Headaches are compounded by diplopia, nausea, photophobia, phonophobia and neck stiffness.  Headaches can be brought on by weather or stress.  Pain is marginally improved with rest as well as taking propranolol 3 times daily.  Patient has failed to have improvement with Imitrex and Topamax in the past.  Of note patient reports complex prior history of having trifecta of dizziness, migraines and chest pain with echo and stress test unremarkable.  Patient had a heart catheterization demonstrating a blood clot in the right coronary artery." Since that time patient reports she has been advised to avoid migraine medications and birth control, which may have been contributing to her symptoms as well.  Today " she denies more distal radiculopathy into the upper extremities or hands, compromise in hand  strength or dexterity.  Of note patient has not had a follow-up with Neurology since 2019 during her cardiac workup.  Patient has performed physician directed cervical traction and strengthening exercises over the last 8 weeks without meaningful improvement in her headaches or pain.    Patient denies night fever/night sweats, urinary incontinence, bowel incontinence, significant weight loss, significant motor weakness, and loss of sensations.    Pain Disability Index Review:     Last 3 PDI Scores 2023   Pain Disability Index (PDI) 55 48       Non-Pharmacologic Treatments:  Physical Therapy/Home Exercise: yes  Ice/Heat:yes  TENS: no  Acupuncture: no  Massage: no  Chiropractic: no    Other: no      Pain Medications:  - Opioids: Percocet (Oxycodone/Acetaminophen)  - Adjuvant Medications: Cyclobenzaprine (Flexeril)  - Anti-Coagulants: Aspirin    Pain Procedures:   None    Past Medical History:   Diagnosis Date    Heart attack     Hx of heart artery stent     Migraine     Skin abnormalities      Past Surgical History:   Procedure Laterality Date    BREAST SURGERY       SECTION      HYSTERECTOMY      paritial    TOTAL REDUCTION MAMMOPLASTY       Review of patient's allergies indicates:   Allergen Reactions    Latex      Other reaction(s): Rash, facial       Current Outpatient Medications   Medication Sig    aspirin (ECOTRIN) 81 MG EC tablet Take 81 mg by mouth.    cyclobenzaprine (FLEXERIL) 10 MG tablet Take 1 tablet (10 mg total) by mouth 3 (three) times daily.    gabapentin (NEURONTIN) 100 MG capsule Take 1 capsule (100 mg total) by mouth 2 (two) times daily.    ketoconazole (NIZORAL) 2 % cream APPLY ON THE SKIN BID PRN.  USE ON THE FACE    oxyCODONE-acetaminophen (PERCOCET) 7.5-325 mg per tablet Take 1 tablet by mouth every 6 (six) hours as needed for Pain.    propranolol (INDERAL) 20 MG tablet  "propranolol    triamcinolone acetonide 0.1% (KENALOG) 0.1 % ointment     ubrogepant (UBRELVY) 100 mg tablet Take 1 tablet (100 mg total) by mouth as needed for Migraine. If symptoms persist or return, may repeat dose after 2 hours. Maximum: 200 mg per 24 hours    zolpidem (AMBIEN) 5 MG Tab Take 1 tablet (5 mg total) by mouth nightly as needed (insomnia).    benzonatate (TESSALON PERLES) 100 MG capsule Take 2 capsules (200 mg total) by mouth 3 (three) times daily as needed for Cough.    dexbrompheniramine-phenyleph (ALAHIST PE) 2-7.5 mg Tab Take 1 tablet by mouth every 6 (six) hours as needed (sinus congestion).    fluticasone (FLONASE) 50 mcg/actuation nasal spray fluticasone 50 mcg/actuation nasal spray,suspension   Spray 2 sprays every day by intranasal route.    loratadine (CLARITIN) 10 mg tablet Take 1 tablet (10 mg total) by mouth once daily.    methylPREDNISolone (MEDROL DOSEPACK) 4 mg tablet TAKE AS DIRECTED     Current Facility-Administered Medications   Medication    onabotulinumtoxina injection 200 Units       Review of Systems     GENERAL:  No weight loss, malaise or fevers.  HEENT:   No recent changes in vision or hearing  NECK:  Negative for lumps, no difficulty with swallowing.  RESPIRATORY:  Negative for cough, wheezing or shortness of breath, patient denies any recent URI.  CARDIOVASCULAR:  Negative for chest pain or palpitations.  GI:  Negative for abdominal discomfort, blood in stools or black stools or change in bowel habits.  MUSCULOSKELETAL:  See HPI.  SKIN:  Negative for lesions, rash, and itching.  PSYCH:  No mood disorder or recent psychosocial stressors.   HEMATOLOGY/LYMPHOLOGY:  Negative for prolonged bleeding, bruising easily or swollen nodes.    NEURO:   No history of syncope, paralysis, seizures or tremors.  All other reviewed and negative other than HPI.    OBJECTIVE:    /81   Pulse 70   Ht 5' 1" (1.549 m)   Wt 79.9 kg (176 lb 2.4 oz)   BMI 33.28 kg/m²       Physical " Exam    GENERAL: Well appearing, in no acute distress, alert and oriented x3.  PSYCH:  Mood and affect appropriate.  SKIN: Skin color, texture, turgor normal, no rashes or lesions.  HEAD/FACE:  Normocephalic, atraumatic. Cranial nerves grossly intact.    NECK: No pain to palpation over the cervical paraspinous muscles. Spurling Negative. No pain with neck flexion, extension, or lateral flexion.   Normaltesting biceps, triceps and brachioradialis bilaterally.    NegativeHoffmann's bilaterally.    5/5 strength testing deltoid, biceps, triceps, wrist extensor, wrist flexor and ulnar intrinsics bilaterally.    Normal  strength bilaterally    CV: RRR with palpation of the radial artery.  PULM: No evidence of respiratory difficulty, symmetric chest rise.  GI:  Soft and non-tender.    NEURO: Bilateral upper and lower extremity coordination and muscle stretch reflexes are physiologic and symmetric. No loss of sensation is noted.  GAIT: normal.    Imaging:   None      ASSESSMENT: 53 y.o. year old female with head pain, consistent with     1. Intractable migraine with status migrainosus, unspecified migraine type  onabotulinumtoxina injection 200 Units    Prior authorization Order              PLAN:   - Interventions:      Patient is a candidate for Botox treatment secondary to greater than 15 migraine headaches per month, lasting 3-5 days in duration with failed response to agents including nonsteroidal anti-inflammatory medications, Imitrex, Topamax and propranolol.    Pre-Op Diagnosis: chronic intractable migraine    Post-Op Diagnosis:  Same     Anticipated Botox mapping:        - muscles:  5 units bilaterally, 10 units total  -Procerus muscle: 5 units  -Frontalis muscle:  4 sites, 5 units each, 20 units total  -Temporalis muscle: 5 units , 4 sites bilaterally, 20 units total  -Occipitalis muscle: 5 units, 3 sites bilaterally, 30 units total  -Trapezius muscles:  5 units, 3 sites bilaterally, 30 units  total  -cervical paraspinous:  5 units, 2 sites bilaterally, 20 units total    Total: 135 Units  Waste: 65 Units    -Botox: 08/21/2023    - Anticoagulation use: Yes aspirin     report:  Reviewed and consistent with medication use as prescribed.    - Medications:  -continue propranolol, Ubrelvy per Ms. Kimberly NP    - Therapy:   -We discussed continuing physician directed at home physical therapy to help manage the patient/s painful condition. The patient was counseled that muscle strengthening will improve the long term prognosis in regards to pain and may also help increase range of motion and mobility. .    - Consults/Referrals:  -Neurology: est care; migraine HA    - Follow up visit: return to clinic in 3 months. Extended visit. Botox.       The above plan and management options were discussed at length with patient. Patient is in agreement with the above and verbalized understanding.    - I discussed the goals of interventional chronic pain management with the patient on today's visit. We discussed a multimodal and systematic approach to pain.  This includes diagnostic and therapeutic injections, adjuvant pharmacologic treatment, physical therapy, and at times psychiatry.  I emphasized the importance of regular exercise, core strengthening and stretching, diet and weight loss as a cornerstone of long-term pain management.    - This condition does not require this patient to take time off of work, and the primary goal of our Pain Management services is to improve the patient's functional capacity.  - Patient Questions: Answered all of the patient's questions regarding diagnoses, therapy, treatment and next steps        Kael Jean-Baptiste MD  Interventional Pain Management  [facility]    Disclaimer:  This note was prepared using voice recognition system and is likely to have sound alike errors that may have been overlooked even after proof reading.  Please call me with any questions

## 2023-08-21 ENCOUNTER — OFFICE VISIT (OUTPATIENT)
Dept: PAIN MEDICINE | Facility: CLINIC | Age: 53
End: 2023-08-21
Payer: COMMERCIAL

## 2023-08-21 VITALS
WEIGHT: 176.13 LBS | DIASTOLIC BLOOD PRESSURE: 81 MMHG | HEART RATE: 70 BPM | SYSTOLIC BLOOD PRESSURE: 136 MMHG | HEIGHT: 61 IN | BODY MASS INDEX: 33.25 KG/M2

## 2023-08-21 DIAGNOSIS — G43.911 INTRACTABLE MIGRAINE WITH STATUS MIGRAINOSUS, UNSPECIFIED MIGRAINE TYPE: Primary | ICD-10-CM

## 2023-08-21 PROCEDURE — 99999 PR PBB SHADOW E&M-EST. PATIENT-LVL IV: ICD-10-PCS | Mod: PBBFAC,,, | Performed by: ANESTHESIOLOGY

## 2023-08-21 PROCEDURE — 64615 PR CHEMODENERVATION OF MUSCLE FOR CHRONIC MIGRAINE: ICD-10-PCS | Mod: S$GLB,,, | Performed by: ANESTHESIOLOGY

## 2023-08-21 PROCEDURE — 3008F PR BODY MASS INDEX (BMI) DOCUMENTED: ICD-10-PCS | Mod: CPTII,S$GLB,, | Performed by: ANESTHESIOLOGY

## 2023-08-21 PROCEDURE — 3008F BODY MASS INDEX DOCD: CPT | Mod: CPTII,S$GLB,, | Performed by: ANESTHESIOLOGY

## 2023-08-21 PROCEDURE — 64615 CHEMODENERV MUSC MIGRAINE: CPT | Mod: S$GLB,,, | Performed by: ANESTHESIOLOGY

## 2023-08-21 PROCEDURE — 3079F DIAST BP 80-89 MM HG: CPT | Mod: CPTII,S$GLB,, | Performed by: ANESTHESIOLOGY

## 2023-08-21 PROCEDURE — 3075F PR MOST RECENT SYSTOLIC BLOOD PRESS GE 130-139MM HG: ICD-10-PCS | Mod: CPTII,S$GLB,, | Performed by: ANESTHESIOLOGY

## 2023-08-21 PROCEDURE — 3079F PR MOST RECENT DIASTOLIC BLOOD PRESSURE 80-89 MM HG: ICD-10-PCS | Mod: CPTII,S$GLB,, | Performed by: ANESTHESIOLOGY

## 2023-08-21 PROCEDURE — 1159F PR MEDICATION LIST DOCUMENTED IN MEDICAL RECORD: ICD-10-PCS | Mod: CPTII,S$GLB,, | Performed by: ANESTHESIOLOGY

## 2023-08-21 PROCEDURE — 99214 OFFICE O/P EST MOD 30 MIN: CPT | Mod: 25,S$GLB,, | Performed by: ANESTHESIOLOGY

## 2023-08-21 PROCEDURE — 99999 PR PBB SHADOW E&M-EST. PATIENT-LVL IV: CPT | Mod: PBBFAC,,, | Performed by: ANESTHESIOLOGY

## 2023-08-21 PROCEDURE — 99214 PR OFFICE/OUTPT VISIT, EST, LEVL IV, 30-39 MIN: ICD-10-PCS | Mod: 25,S$GLB,, | Performed by: ANESTHESIOLOGY

## 2023-08-21 PROCEDURE — 1159F MED LIST DOCD IN RCRD: CPT | Mod: CPTII,S$GLB,, | Performed by: ANESTHESIOLOGY

## 2023-08-21 PROCEDURE — 3075F SYST BP GE 130 - 139MM HG: CPT | Mod: CPTII,S$GLB,, | Performed by: ANESTHESIOLOGY

## 2023-09-05 ENCOUNTER — PATIENT MESSAGE (OUTPATIENT)
Dept: PAIN MEDICINE | Facility: CLINIC | Age: 53
End: 2023-09-05
Payer: COMMERCIAL

## 2023-09-06 ENCOUNTER — TELEPHONE (OUTPATIENT)
Dept: PAIN MEDICINE | Facility: CLINIC | Age: 53
End: 2023-09-06
Payer: COMMERCIAL

## 2023-09-06 NOTE — TELEPHONE ENCOUNTER
Attempt to call patient to confirm appointment. Patent did not answer, Left message on patients voice mail to call back at earliest convenience to confirm or reschedule p.t apt.     Danis Acosta  Medical Assistant

## 2023-09-06 NOTE — TELEPHONE ENCOUNTER
Called patient to confirm appointment . Patent Answered and did confirmed.     Danis Acosta  Medical Assistant

## 2023-09-27 ENCOUNTER — OFFICE VISIT (OUTPATIENT)
Dept: URGENT CARE | Facility: CLINIC | Age: 53
End: 2023-09-27
Payer: COMMERCIAL

## 2023-09-27 VITALS
OXYGEN SATURATION: 100 % | RESPIRATION RATE: 16 BRPM | BODY MASS INDEX: 31.97 KG/M2 | SYSTOLIC BLOOD PRESSURE: 164 MMHG | TEMPERATURE: 97 F | DIASTOLIC BLOOD PRESSURE: 77 MMHG | HEIGHT: 62 IN | HEART RATE: 78 BPM | WEIGHT: 173.75 LBS

## 2023-09-27 DIAGNOSIS — B34.9 VIRAL ILLNESS: ICD-10-CM

## 2023-09-27 DIAGNOSIS — R05.9 COUGH, UNSPECIFIED TYPE: Primary | ICD-10-CM

## 2023-09-27 LAB
CTP QC/QA: YES
SARS-COV-2 AG RESP QL IA.RAPID: NEGATIVE

## 2023-09-27 PROCEDURE — 99213 PR OFFICE/OUTPT VISIT, EST, LEVL III, 20-29 MIN: ICD-10-PCS | Mod: S$GLB,,, | Performed by: FAMILY MEDICINE

## 2023-09-27 PROCEDURE — 87811 SARS-COV-2 COVID19 W/OPTIC: CPT | Mod: QW,S$GLB,, | Performed by: FAMILY MEDICINE

## 2023-09-27 PROCEDURE — 87811 SARS CORONAVIRUS 2 ANTIGEN POCT, MANUAL READ: ICD-10-PCS | Mod: QW,S$GLB,, | Performed by: FAMILY MEDICINE

## 2023-09-27 PROCEDURE — 99213 OFFICE O/P EST LOW 20 MIN: CPT | Mod: S$GLB,,, | Performed by: FAMILY MEDICINE

## 2023-09-28 NOTE — PROGRESS NOTES
"Subjective:      Patient ID: Altagracia Islas is a 53 y.o. female.    Vitals:  height is 5' 2.48" (1.587 m) and weight is 78.8 kg (173 lb 11.6 oz). Her tympanic temperature is 97.4 °F (36.3 °C). Her blood pressure is 164/77 (abnormal) and her pulse is 78. Her respiration is 16 and oxygen saturation is 100%.     Chief Complaint: Sinus Problem    54 y/o female presents to clinic with c/o sinus pressure, headache, sinus drip, body aches, cough, and increased sneezing. Patient states symptoms started late Monday, but got really bad on yesterday. Patient states neck and right ear have pulsating pain, and head and nasal area is built up pressure. Patient states she has taken tylenol and benadryl for symptoms and got very minimal relief.      Sinus Problem  This is a new problem. The current episode started in the past 7 days. The problem has been gradually worsening since onset. There has been no fever. Her pain is at a severity of 8/10. The pain is moderate. Associated symptoms include chills, congestion, coughing, ear pain, headaches, neck pain, sinus pressure, sneezing and a sore throat. Pertinent negatives include no diaphoresis, hoarse voice, shortness of breath or swollen glands. Past treatments include acetaminophen (benadryl). The treatment provided mild relief.       Constitution: Positive for chills. Negative for sweating and fever.   HENT:  Positive for ear pain, congestion, sinus pressure and sore throat.    Neck: Positive for neck pain.   Respiratory:  Positive for cough. Negative for shortness of breath.    Allergic/Immunologic: Positive for sneezing.   Neurological:  Positive for headaches.      Objective:     Physical Exam   Constitutional: She is oriented to person, place, and time. She appears well-developed. She is cooperative.  Non-toxic appearance. She does not appear ill. No distress.   HENT:   Head: Normocephalic and atraumatic.   Ears:   Right Ear: Hearing, tympanic membrane, external ear and ear " canal normal.   Left Ear: Hearing, tympanic membrane, external ear and ear canal normal.   Nose: Congestion present. No mucosal edema, rhinorrhea or nasal deformity. No epistaxis. Right sinus exhibits no maxillary sinus tenderness and no frontal sinus tenderness. Left sinus exhibits no maxillary sinus tenderness and no frontal sinus tenderness.   Mouth/Throat: Uvula is midline, oropharynx is clear and moist and mucous membranes are normal. No trismus in the jaw. Normal dentition. No uvula swelling. No oropharyngeal exudate, posterior oropharyngeal edema or posterior oropharyngeal erythema.   Eyes: Conjunctivae and lids are normal.      Comments: Eyes puffy   Neck: Trachea normal and phonation normal. No edema present. No erythema present. No neck rigidity present.   Cardiovascular: Normal rate, regular rhythm, normal heart sounds and normal pulses.   Pulmonary/Chest: Effort normal and breath sounds normal. No respiratory distress. She has no decreased breath sounds. She has no rhonchi.   Abdominal: Normal appearance.   Lymphadenopathy:     She has no cervical adenopathy.   Neurological: She is alert and oriented to person, place, and time. She exhibits normal muscle tone.   Skin: Skin is warm, intact and not diaphoretic. Capillary refill takes less than 2 seconds.   Psychiatric: Her speech is normal.   Nursing note and vitals reviewed.      Assessment:     1. Cough, unspecified type    2. Viral illness      Results for orders placed or performed in visit on 09/27/23   SARS Coronavirus 2 Antigen, POCT Manual Read   Result Value Ref Range    SARS Coronavirus 2 Antigen Negative Negative     Acceptable Yes         Plan:       Cough, unspecified type  -     SARS Coronavirus 2 Antigen, POCT Manual Read    Viral illness    Rest. Hydrate.   Over the counter medications as needed to relieve symptoms:    1. Sore throat - lozenges and honey. Gargle with warm salt water. Ibuprofen or tylenol for pain and  discomfort  2. Acetaminophen and/or Ibuprofen for fever, bodyaches and headaches   3. Saline nasal drop or spray to help drain congested sinus  4. Claritin/Zytec/Allegra/flonase  for running nose, teary eyes, sinus drainage  5. Guaifenesin +/-  dextromethophan for wet cough     Follow up if no improvement in a few days, or symptoms worsen.

## 2023-09-28 NOTE — PATIENT INSTRUCTIONS
Rest. Hydrate.   Over the counter medications as needed to relieve symptoms:    1. Sore throat - lozenges and honey. Gargle with warm salt water. Ibuprofen or tylenol for pain and discomfort  2. Acetaminophen and/or Ibuprofen for fever, bodyaches and headaches   3. Saline nasal drop or spray to help drain congested sinus  4. Claritin/Zytec/Allegra/flonase  for running nose, teary eyes, sinus drainage  5. Guaifenesin +/-  dextromethophan for wet cough     Follow up if no improvement in a few days, or symptoms worsen.

## 2023-09-30 ENCOUNTER — TELEPHONE (OUTPATIENT)
Dept: URGENT CARE | Facility: CLINIC | Age: 53
End: 2023-09-30
Payer: COMMERCIAL

## 2023-10-18 DIAGNOSIS — G43.911 INTRACTABLE MIGRAINE WITH STATUS MIGRAINOSUS, UNSPECIFIED MIGRAINE TYPE: ICD-10-CM

## 2023-10-19 ENCOUNTER — PATIENT MESSAGE (OUTPATIENT)
Dept: NEUROLOGY | Facility: CLINIC | Age: 53
End: 2023-10-19
Payer: COMMERCIAL

## 2023-10-20 RX ORDER — UBROGEPANT 100 MG/1
100 TABLET ORAL
Qty: 9 TABLET | Refills: 12 | OUTPATIENT
Start: 2023-10-20

## 2023-10-23 ENCOUNTER — TELEPHONE (OUTPATIENT)
Dept: NEUROLOGY | Facility: CLINIC | Age: 53
End: 2023-10-23
Payer: COMMERCIAL

## 2023-10-23 NOTE — TELEPHONE ENCOUNTER
----- Message from Jessica Markos sent at 10/20/2023  4:33 PM CDT -----  Regarding: FW: Medication  Contact: [phone]  Approved until 01/20/2024  ----- Message -----  From: MarkosJessica sadler  Sent: 10/20/2023   1:28 PM CDT  To: Gwen Harvey MA  Subject: RE: Medication                                   Yes. I submitted it on prompt[employer], just waiting on the decision  ----- Message -----  From: Gwen Harvey MA  Sent: 10/20/2023   1:25 PM CDT  To: Jessica Burrows  Subject: FW: Medication                                   I know you have probably already seen this   ----- Message -----  From: Mary Guerrero  Sent: 10/20/2023   1:03 PM CDT  To: Treva Graham Staff  Subject: Medication                                       Pt is calling in ref to a prior authorization for medication ubrogepant (UBRELVY) 100 mg tablet. She says she is out of her medication and is in need of them. Second time calling and portal message.  Patient Requesting Call Back @  [phone]       [id number]  [place] - [address] [facility]  [address]  [place] [address]  Phone: [phone] Fax: [phone]

## 2024-01-08 NOTE — PROGRESS NOTES
Established Patient Chronic Pain Note     Referring Physician: Kale Jean-Baptiste MD    PCP: Atiya, Primary Doctor    Chief Complaint:   Chief Complaint   Patient presents with    Neck Pain    Shoulder Pain        SUBJECTIVE:  Interval history 01/09/2024  Patient presents for Botox treatment and does report noticeable improvement in frequency and intensity of headaches following 1st Botox treatment.  She does report considerable distress following her treatment as she did not anticipate the number of injections associated with the procedure.  Patient reports she reconsidered this treatment as she suffered for several days following her procedure with neck pain, headaches and post procedural anxiolysis.  Patient reports she feels better informed during our clinic visit after reviewing the informational guide provided at our last clinic visit.    History: Patient reports diagnosis of migraines exceeding 20 years in duration.  Patient reports her migraines occur at least 2-3 days per week and lasts in between 3-5 days in duration.  She reports greater than 15 days per month with migraine headaches.  Pain is typically in the frontotemporal, retro-orbital and periauricular territories.  Patient reports when present headaches are typically unilateral but can vary from right to left.  Pain today is rated 0/10 but at its worse is a 10/10.  Pain is described as dull and throbbing and pounding in nature.  Headaches are compounded by diplopia, nausea, photophobia, phonophobia and neck stiffness.  Headaches can be brought on by weather or stress.  Pain is marginally improved with rest as well as taking propranolol 3 times daily.  Patient has failed to have improvement with Imitrex and Topamax in the past    PROCEDURE:  Informed consent was obtained and the procedure was described to the patient, a timeout was performed prior to starting the procedure.    Surgeon: Kale Jean-Baptiste MD      Pre-Op Diagnosis: chronic intractable migraine,  atypical cluster headache    Post-Op Diagnosis:  Same    Procedure:  Botox injections for headache    EBL: None    Complications: None    Specimens: None    Description of Procedure:    The patient was placed in the sitting position and the areas over the planned injections were all prepped with alcohol.  botox was reconstituted in preservative free normal saline, 100 units was placed in 2 mLof saline on a 30-gauge needle.  5 Units each was placed into the following muscles:       Botox mapping:        - muscles:  5 units bilaterally, 10 units total  -Procerus muscle: 5 units  -Frontalis muscle:  4 sites, 5 units each, 20 units total  -Temporalis muscle: 5 units , 4 sites bilaterally, 20 units total  -Occipitalis muscle: 5 units, 3 sites bilaterally, 30 units total  -Trapezius muscles:  5 units, 3 sites bilaterally, 30 units total  -cervical paraspinous:  5 units, 2 sites bilaterally, 20 units total    Total: 135 Units  Waste: 65 Units    Patient tolerated the procedure well and bleeding was nil no Band-Aids were applied the patient was discharged in stable condition.      Interval history 08/21/2023   Patient presents for Botox treatment. Patient reports diagnosis of migraines exceeding 20 years in duration.  Patient reports her migraines occur at least 2-3 days per week and lasts in between 3-5 days in duration.  She reports greater than 15 days per month with migraine headaches.  Pain is typically in the frontotemporal, retro-orbital and periauricular territories.  Patient reports when present headaches are typically unilateral but can vary from right to left.  Pain today is rated 0/10 but at its worse is a 10/10.  Pain is described as dull and throbbing and pounding in nature.  Headaches are compounded by diplopia, nausea, photophobia, phonophobia and neck stiffness.  Headaches can be brought on by weather or stress.  Pain is marginally improved with rest as well as taking propranolol 3 times daily.   Patient has failed to have improvement with Imitrex and Topamax in the past    PROCEDURE:  Informed consent was obtained and the procedure was described to the patient, a timeout was performed prior to starting the procedure.    Surgeon: Kale Jean-Baptiste MD      Pre-Op Diagnosis: chronic intractable migraine, atypical cluster headache    Post-Op Diagnosis:  Same    Procedure:  Botox injections for headache    EBL: None    Complications: None    Specimens: None    Description of Procedure:    The patient was placed in the sitting position and the areas over the planned injections were all prepped with alcohol.  botox was reconstituted in preservative free normal saline, 100 units was placed in 2 mLof saline on a 30-gauge needle.  5 Units each was placed into the following muscles:       Botox mapping:        - muscles:  5 units bilaterally, 10 units total  -Procerus muscle: 5 units  -Frontalis muscle:  4 sites, 5 units each, 20 units total  -Temporalis muscle: 5 units , 4 sites bilaterally, 20 units total  -Occipitalis muscle: 5 units, 3 sites bilaterally, 30 units total  -Trapezius muscles:  5 units, 3 sites bilaterally, 30 units total  -cervical paraspinous:  5 units, 2 sites bilaterally, 20 units total    Total: 135 Units  Waste: 65 Units    Patient tolerated the procedure well and bleeding was nil no Band-Aids were applied the patient was discharged in stable condition.        HPI 07/24/2023  Altagracia Islas is a 53 y.o. female with past medical history significant for migraine HA, CAD s/p MI s/p PTCA who presents to the clinic for the evaluation of headaches.  Patient reports diagnosis of migraines exceeding 20 years in duration.  Patient reports her migraines occur at least 2-3 days per week and lasts in between 3-5 days in duration.  She reports greater than 15 days per month with migraine headaches.  Pain is typically in the frontotemporal, retro-orbital and periauricular territories.  Patient reports  "when present headaches are typically unilateral but can vary from right to left.  Pain today is rated 0/10 but at its worse is a 10/10.  Pain is described as dull and throbbing and pounding in nature.  Headaches are compounded by diplopia, nausea, photophobia, phonophobia and neck stiffness.  Headaches can be brought on by weather or stress.  Pain is marginally improved with rest as well as taking propranolol 3 times daily.  Patient has failed to have improvement with Imitrex and Topamax in the past.  Of note patient reports complex prior history of having trifecta of dizziness, migraines and chest pain with echo and stress test unremarkable.  Patient had a heart catheterization demonstrating a blood clot in the right coronary artery." Since that time patient reports she has been advised to avoid migraine medications and birth control, which may have been contributing to her symptoms as well.  Today she denies more distal radiculopathy into the upper extremities or hands, compromise in hand  strength or dexterity.  Of note patient has not had a follow-up with Neurology since 2019 during her cardiac workup.  Patient has performed physician directed cervical traction and strengthening exercises over the last 8 weeks without meaningful improvement in her headaches or pain.    Patient denies night fever/night sweats, urinary incontinence, bowel incontinence, significant weight loss, significant motor weakness, and loss of sensations.    Pain Disability Index Review:         1/9/2024    12:17 PM 8/21/2023    11:12 AM 7/24/2023    11:46 AM   Last 3 PDI Scores   Pain Disability Index (PDI) 5 55 48       Non-Pharmacologic Treatments:  Physical Therapy/Home Exercise: yes  Ice/Heat:yes  TENS: no  Acupuncture: no  Massage: no  Chiropractic: no    Other: no      Pain Medications:  - Opioids: Percocet (Oxycodone/Acetaminophen)  - Adjuvant Medications: Cyclobenzaprine (Flexeril)  - Anti-Coagulants: Aspirin    Pain " Procedures:   None    Past Medical History:   Diagnosis Date    Heart attack     Hx of heart artery stent     Migraine     Skin abnormalities      Past Surgical History:   Procedure Laterality Date    BREAST SURGERY       SECTION      HYSTERECTOMY      paritial    TOTAL REDUCTION MAMMOPLASTY       Review of patient's allergies indicates:   Allergen Reactions    Latex      Other reaction(s): Rash, facial       Current Outpatient Medications   Medication Sig    aspirin (ECOTRIN) 81 MG EC tablet Take 81 mg by mouth.    benzonatate (TESSALON PERLES) 100 MG capsule Take 2 capsules (200 mg total) by mouth 3 (three) times daily as needed for Cough. (Patient not taking: Reported on 2023)    cyclobenzaprine (FLEXERIL) 10 MG tablet Take 1 tablet (10 mg total) by mouth 3 (three) times daily.    dexbrompheniramine-phenyleph (ALAHIST PE) 2-7.5 mg Tab Take 1 tablet by mouth every 6 (six) hours as needed (sinus congestion).    fluticasone (FLONASE) 50 mcg/actuation nasal spray fluticasone 50 mcg/actuation nasal spray,suspension   Spray 2 sprays every day by intranasal route.    gabapentin (NEURONTIN) 100 MG capsule Take 1 capsule (100 mg total) by mouth 2 (two) times daily.    ketoconazole (NIZORAL) 2 % cream APPLY ON THE SKIN BID PRN.  USE ON THE FACE    loratadine (CLARITIN) 10 mg tablet Take 1 tablet (10 mg total) by mouth once daily.    methylPREDNISolone (MEDROL DOSEPACK) 4 mg tablet TAKE AS DIRECTED (Patient not taking: Reported on 2023)    oxyCODONE-acetaminophen (PERCOCET) 7.5-325 mg per tablet Take 1 tablet by mouth every 6 (six) hours as needed for Pain.    propranolol (INDERAL) 20 MG tablet propranolol    triamcinolone acetonide 0.1% (KENALOG) 0.1 % ointment     ubrogepant (UBRELVY) 100 mg tablet Take 1 tablet (100 mg total) by mouth as needed for Migraine. If symptoms persist or return, may repeat dose after 2 hours. Maximum: 200 mg per 24 hours    zolpidem (AMBIEN) 5 MG Tab Take 1 tablet (5 mg total)  "by mouth nightly as needed (insomnia).     Current Facility-Administered Medications   Medication    onabotulinumtoxina injection 200 Units       Review of Systems     GENERAL:  No weight loss, malaise or fevers.  HEENT:   No recent changes in vision or hearing  NECK:  Negative for lumps, no difficulty with swallowing.  RESPIRATORY:  Negative for cough, wheezing or shortness of breath, patient denies any recent URI.  CARDIOVASCULAR:  Negative for chest pain or palpitations.  GI:  Negative for abdominal discomfort, blood in stools or black stools or change in bowel habits.  MUSCULOSKELETAL:  See HPI.  SKIN:  Negative for lesions, rash, and itching.  PSYCH:  No mood disorder or recent psychosocial stressors.   HEMATOLOGY/LYMPHOLOGY:  Negative for prolonged bleeding, bruising easily or swollen nodes.    NEURO:   No history of syncope, paralysis, seizures or tremors.  All other reviewed and negative other than HPI.    OBJECTIVE:    /78   Pulse 63   Resp 17   Ht 5' 2" (1.575 m)   Wt 79 kg (174 lb 2.6 oz)   BMI 31.85 kg/m²       Physical Exam    GENERAL: Well appearing, in no acute distress, alert and oriented x3.  PSYCH:  Mood and affect appropriate.  SKIN: Skin color, texture, turgor normal, no rashes or lesions.  HEAD/FACE:  Normocephalic, atraumatic. Cranial nerves grossly intact.    NECK: No pain to palpation over the cervical paraspinous muscles. Spurling Negative. No pain with neck flexion, extension, or lateral flexion.   Normaltesting biceps, triceps and brachioradialis bilaterally.    NegativeHoffmann's bilaterally.    5/5 strength testing deltoid, biceps, triceps, wrist extensor, wrist flexor and ulnar intrinsics bilaterally.    Normal  strength bilaterally    CV: RRR with palpation of the radial artery.  PULM: No evidence of respiratory difficulty, symmetric chest rise.  GI:  Soft and non-tender.    NEURO: Bilateral upper and lower extremity coordination and muscle stretch reflexes are " physiologic and symmetric. No loss of sensation is noted.  GAIT: normal.    Imaging:   None      ASSESSMENT: 53 y.o. year old female with head pain, consistent with     1. Intractable migraine with status migrainosus, unspecified migraine type  onabotulinumtoxina injection 200 Units    Prior authorization Order                PLAN:   - Interventions:  Schedule for repeat Botox treatment in 3 months.    Prior Botox Tx:  1/09/2024 08/21/2023    Patient is a candidate for Botox treatment secondary to greater than 15 migraine headaches per month, lasting 3-5 days in duration with failed response to agents including nonsteroidal anti-inflammatory medications, Imitrex, Topamax and propranolol.    Pre-Op Diagnosis: chronic intractable migraine    Post-Op Diagnosis:  Same     Anticipated Botox mapping:        - muscles:  5 units bilaterally, 10 units total  -Procerus muscle: 5 units  -Frontalis muscle:  4 sites, 5 units each, 20 units total  -Temporalis muscle: 5 units , 4 sites bilaterally, 20 units total  -Occipitalis muscle: 5 units, 3 sites bilaterally, 30 units total  -Trapezius muscles:  5 units, 3 sites bilaterally, 30 units total  -cervical paraspinous:  5 units, 2 sites bilaterally, 20 units total    Total: 135 Units  Waste: 65 Units    - Anticoagulation use: Yes aspirin     report:  Reviewed and consistent with medication use as prescribed.    - Medications:  -continue propranolol, Ubrelvy per Ms. Kimberly NP    - Therapy:   -We discussed continuing physician directed at home physical therapy to help manage the patient/s painful condition. The patient was counseled that muscle strengthening will improve the long term prognosis in regards to pain and may also help increase range of motion and mobility. .    - Consults/Referrals:  -Neurology: est care; migraine HA    - Follow up visit: return to clinic in 3 months. Extended visit. Botox.       The above plan and management options were discussed at  length with patient. Patient is in agreement with the above and verbalized understanding.    - I discussed the goals of interventional chronic pain management with the patient on today's visit. We discussed a multimodal and systematic approach to pain.  This includes diagnostic and therapeutic injections, adjuvant pharmacologic treatment, physical therapy, and at times psychiatry.  I emphasized the importance of regular exercise, core strengthening and stretching, diet and weight loss as a cornerstone of long-term pain management.    - This condition does not require this patient to take time off of work, and the primary goal of our Pain Management services is to improve the patient's functional capacity.  - Patient Questions: Answered all of the patient's questions regarding diagnoses, therapy, treatment and next steps        Kale Jean-Baptiste MD  Interventional Pain Management  [facility]    Disclaimer:  This note was prepared using voice recognition system and is likely to have sound alike errors that may have been overlooked even after proof reading.  Please call me with any questions

## 2024-01-09 ENCOUNTER — OFFICE VISIT (OUTPATIENT)
Dept: PAIN MEDICINE | Facility: CLINIC | Age: 54
End: 2024-01-09
Payer: COMMERCIAL

## 2024-01-09 VITALS
HEIGHT: 62 IN | HEART RATE: 63 BPM | WEIGHT: 174.19 LBS | BODY MASS INDEX: 32.05 KG/M2 | SYSTOLIC BLOOD PRESSURE: 131 MMHG | DIASTOLIC BLOOD PRESSURE: 78 MMHG | RESPIRATION RATE: 17 BRPM

## 2024-01-09 DIAGNOSIS — G43.911 INTRACTABLE MIGRAINE WITH STATUS MIGRAINOSUS, UNSPECIFIED MIGRAINE TYPE: Primary | ICD-10-CM

## 2024-01-09 DIAGNOSIS — G43.911 INTRACTABLE MIGRAINE WITH STATUS MIGRAINOSUS, UNSPECIFIED MIGRAINE TYPE: ICD-10-CM

## 2024-01-09 PROCEDURE — 1159F MED LIST DOCD IN RCRD: CPT | Mod: CPTII,S$GLB,, | Performed by: ANESTHESIOLOGY

## 2024-01-09 PROCEDURE — 3008F BODY MASS INDEX DOCD: CPT | Mod: CPTII,S$GLB,, | Performed by: ANESTHESIOLOGY

## 2024-01-09 PROCEDURE — 3078F DIAST BP <80 MM HG: CPT | Mod: CPTII,S$GLB,, | Performed by: ANESTHESIOLOGY

## 2024-01-09 PROCEDURE — 99999 PR PBB SHADOW E&M-EST. PATIENT-LVL III: CPT | Mod: PBBFAC,,, | Performed by: ANESTHESIOLOGY

## 2024-01-09 PROCEDURE — 99214 OFFICE O/P EST MOD 30 MIN: CPT | Mod: 25,S$GLB,, | Performed by: ANESTHESIOLOGY

## 2024-01-09 PROCEDURE — 64615 CHEMODENERV MUSC MIGRAINE: CPT | Mod: S$GLB,,, | Performed by: ANESTHESIOLOGY

## 2024-01-09 PROCEDURE — 1160F RVW MEDS BY RX/DR IN RCRD: CPT | Mod: CPTII,S$GLB,, | Performed by: ANESTHESIOLOGY

## 2024-01-09 PROCEDURE — 3075F SYST BP GE 130 - 139MM HG: CPT | Mod: CPTII,S$GLB,, | Performed by: ANESTHESIOLOGY

## 2024-01-09 RX ORDER — CYCLOBENZAPRINE HCL 10 MG
10 TABLET ORAL 3 TIMES DAILY
Qty: 10 TABLET | Refills: 0 | Status: SHIPPED | OUTPATIENT
Start: 2024-01-09

## 2024-03-19 ENCOUNTER — TELEPHONE (OUTPATIENT)
Dept: PAIN MEDICINE | Facility: CLINIC | Age: 54
End: 2024-03-19
Payer: COMMERCIAL

## 2024-03-19 NOTE — TELEPHONE ENCOUNTER
----- Message from Radha Prieto sent at 3/19/2024  8:26 AM CDT -----  JUDY GARVEY calling regarding Patient Advice  for a missed call from Danis about appt rescheduling, call back to assist  [phone]

## 2024-03-19 NOTE — TELEPHONE ENCOUNTER
Reached out to patient to reschedule appointment because the provider will be in a meting  No answer. Left message on patients voice mail to call back at earliest convenience to schedule apt.   ( Pt NEW appt time will be at 1:30)    Danis Arenas

## 2024-03-28 ENCOUNTER — PATIENT MESSAGE (OUTPATIENT)
Dept: NEUROLOGY | Facility: CLINIC | Age: 54
End: 2024-03-28
Payer: COMMERCIAL

## 2024-03-28 DIAGNOSIS — G43.911 INTRACTABLE MIGRAINE WITH STATUS MIGRAINOSUS, UNSPECIFIED MIGRAINE TYPE: ICD-10-CM

## 2024-03-28 RX ORDER — UBROGEPANT 100 MG/1
100 TABLET ORAL
Qty: 9 TABLET | Refills: 12 | Status: SHIPPED | OUTPATIENT
Start: 2024-03-28

## 2024-04-08 NOTE — PROGRESS NOTES
Established Patient Chronic Pain Note     Referring Physician: Kale Jean-Baptiste MD    PCP: Atiya, Primary Doctor    Chief Complaint:   Chief Complaint   Patient presents with    Headache        SUBJECTIVE:  Interval history 04/09/2024  Patient presents for Botox procedure:  Patient reports significant improvement in frequency, intensity of migraine headaches with initiation of Botox treatment.  Patient reports 80% improvement.  She reports headaches may return approximately after 2-1/2 months.  Even with recurrence, these headaches tend to be less intense with shorter durations.  She does report pain along primarily frontotemporal and cervical paraspinous musculature when headaches are present.  She reports stress has been increased as her mother-in-law has been sick in [place].  Patient continues to work 2 jobs during this period as well.  She does report difficulty scheduling follow with Dr. Mckeon, with whom she discussed headaches and insomnia.     PROCEDURE:  Informed consent was obtained and the procedure was described to the patient, a timeout was performed prior to starting the procedure.    Surgeon: Kale Jean-Baptiste MD      Pre-Op Diagnosis: chronic intractable migraine    Post-Op Diagnosis:  Same    Procedure:  Botox injections for headache    EBL: None    Complications: None    Specimens: None    Description of Procedure:    The patient was placed in the sitting position and the areas over the planned injections were all prepped with alcohol.  botox was reconstituted in preservative free normal saline, 100 units was placed in 2 mLof saline on a 30-gauge needle.  5 Units each was placed into the following muscles:       Botox mapping:        - muscles:  5 units bilaterally, 10 units total  -Procerus muscle: 5 units  -Frontalis muscle:  4 sites, 5 units each, 20 units total  -Temporalis muscle: 5 units , 4 sites bilaterally, 20 units total  -Occipitalis muscle: 5 units, 3 sites bilaterally, 30 units  total  -Trapezius muscles:  5 units, 3 sites bilaterally, 30 units total  -cervical paraspinous:  5 units, 2 sites bilaterally, 20 units total    Total: 135 Units    Patient tolerated the procedure well and bleeding was nil no Band-Aids were applied the patient was discharged in stable condition.          Interval history 08/21/2023   Patient presents for Botox treatment. Patient reports diagnosis of migraines exceeding 20 years in duration.  Patient reports her migraines occur at least 2-3 days per week and lasts in between 3-5 days in duration.  She reports greater than 15 days per month with migraine headaches.  Pain is typically in the frontotemporal, retro-orbital and periauricular territories.  Patient reports when present headaches are typically unilateral but can vary from right to left.  Pain today is rated 0/10 but at its worse is a 10/10.  Pain is described as dull and throbbing and pounding in nature.  Headaches are compounded by diplopia, nausea, photophobia, phonophobia and neck stiffness.  Headaches can be brought on by weather or stress.  Pain is marginally improved with rest as well as taking propranolol 3 times daily.  Patient has failed to have improvement with Imitrex and Topamax in the past    PROCEDURE:  Informed consent was obtained and the procedure was described to the patient, a timeout was performed prior to starting the procedure.    Surgeon: Kale Jean-Baptiste MD      Pre-Op Diagnosis: chronic intractable migraine, atypical cluster headache    Post-Op Diagnosis:  Same    Procedure:  Botox injections for headache    EBL: None    Complications: None    Specimens: None    Description of Procedure:    The patient was placed in the sitting position and the areas over the planned injections were all prepped with alcohol.  botox was reconstituted in preservative free normal saline, 100 units was placed in 2 mLof saline on a 30-gauge needle.  5 Units each was placed into the following muscles:        "Botox mapping:        - muscles:  5 units bilaterally, 10 units total  -Procerus muscle: 5 units  -Frontalis muscle:  4 sites, 5 units each, 20 units total  -Temporalis muscle: 5 units , 4 sites bilaterally, 20 units total  -Occipitalis muscle: 5 units, 3 sites bilaterally, 30 units total  -Trapezius muscles:  5 units, 3 sites bilaterally, 30 units total  -cervical paraspinous:  5 units, 2 sites bilaterally, 20 units total    Total: 135 Units  Waste: 65 Units    Patient tolerated the procedure well and bleeding was nil no Band-Aids were applied the patient was discharged in stable condition.        HPI 07/24/2023  Altagracia Islas is a 53 y.o. female with past medical history significant for migraine HA, CAD s/p MI s/p PTCA who presents to the clinic for the evaluation of headaches.  Patient reports diagnosis of migraines exceeding 20 years in duration.  Patient reports her migraines occur at least 2-3 days per week and lasts in between 3-5 days in duration.  She reports greater than 15 days per month with migraine headaches.  Pain is typically in the frontotemporal, retro-orbital and periauricular territories.  Patient reports when present headaches are typically unilateral but can vary from right to left.  Pain today is rated 0/10 but at its worse is a 10/10.  Pain is described as dull and throbbing and pounding in nature.  Headaches are compounded by diplopia, nausea, photophobia, phonophobia and neck stiffness.  Headaches can be brought on by weather or stress.  Pain is marginally improved with rest as well as taking propranolol 3 times daily.  Patient has failed to have improvement with Imitrex and Topamax in the past.  Of note patient reports complex prior history of having trifecta of dizziness, migraines and chest pain with echo and stress test unremarkable.  Patient had a heart catheterization demonstrating a blood clot in the right coronary artery." Since that time patient reports she has been " advised to avoid migraine medications and birth control, which may have been contributing to her symptoms as well.  Today she denies more distal radiculopathy into the upper extremities or hands, compromise in hand  strength or dexterity.  Of note patient has not had a follow-up with Neurology since 2019 during her cardiac workup.  Patient has performed physician directed cervical traction and strengthening exercises over the last 8 weeks without meaningful improvement in her headaches or pain.    Patient denies night fever/night sweats, urinary incontinence, bowel incontinence, significant weight loss, significant motor weakness, and loss of sensations.    Pain Disability Index Review:         2024     1:41 PM 2024    12:17 PM 2023    11:12 AM   Last 3 PDI Scores   Pain Disability Index (PDI) 14 5 55       Non-Pharmacologic Treatments:  Physical Therapy/Home Exercise: yes  Ice/Heat:yes  TENS: no  Acupuncture: no  Massage: no  Chiropractic: no    Other: no      Pain Medications:  - Opioids: Percocet (Oxycodone/Acetaminophen)  - Adjuvant Medications: Cyclobenzaprine (Flexeril)  - Anti-Coagulants: Aspirin    Pain Procedures:   None    Past Medical History:   Diagnosis Date    Heart attack     Hx of heart artery stent     Migraine     Skin abnormalities      Past Surgical History:   Procedure Laterality Date    BREAST SURGERY       SECTION      HYSTERECTOMY      paritial    TOTAL REDUCTION MAMMOPLASTY       Review of patient's allergies indicates:   Allergen Reactions    Latex      Other reaction(s): Rash, facial       Current Outpatient Medications   Medication Sig    aspirin (ECOTRIN) 81 MG EC tablet Take 81 mg by mouth.    benzonatate (TESSALON PERLES) 100 MG capsule Take 2 capsules (200 mg total) by mouth 3 (three) times daily as needed for Cough.    cyclobenzaprine (FLEXERIL) 10 MG tablet Take 1 tablet (10 mg total) by mouth 3 (three) times daily.    dexbrompheniramine-phenyleph (ALAHIST  PE) 2-7.5 mg Tab Take 1 tablet by mouth every 6 (six) hours as needed (sinus congestion).    fluticasone (FLONASE) 50 mcg/actuation nasal spray fluticasone 50 mcg/actuation nasal spray,suspension   Spray 2 sprays every day by intranasal route.    gabapentin (NEURONTIN) 100 MG capsule Take 1 capsule (100 mg total) by mouth 2 (two) times daily.    ketoconazole (NIZORAL) 2 % cream APPLY ON THE SKIN BID PRN.  USE ON THE FACE    methylPREDNISolone (MEDROL DOSEPACK) 4 mg tablet TAKE AS DIRECTED    oxyCODONE-acetaminophen (PERCOCET) 7.5-325 mg per tablet Take 1 tablet by mouth every 6 (six) hours as needed for Pain.    propranolol (INDERAL) 20 MG tablet propranolol    triamcinolone acetonide 0.1% (KENALOG) 0.1 % ointment     ubrogepant (UBRELVY) 100 mg tablet Take 1 tablet (100 mg total) by mouth as needed for Migraine. If symptoms persist or return, may repeat dose after 2 hours. Maximum: 200 mg per 24 hours    butalbital-acetaminophen-caffeine -40 mg (FIORICET, ESGIC) -40 mg per tablet Take 1 tablet by mouth every 4 (four) hours as needed for Pain.    loratadine (CLARITIN) 10 mg tablet Take 1 tablet (10 mg total) by mouth once daily.    zolpidem (AMBIEN) 5 MG Tab Take 1 tablet (5 mg total) by mouth nightly as needed (insomnia).     Current Facility-Administered Medications   Medication    onabotulinumtoxina injection 200 Units    onabotulinumtoxina injection 200 Units       Review of Systems     GENERAL:  No weight loss, malaise or fevers.  HEENT:   No recent changes in vision or hearing  NECK:  Negative for lumps, no difficulty with swallowing.  RESPIRATORY:  Negative for cough, wheezing or shortness of breath, patient denies any recent URI.  CARDIOVASCULAR:  Negative for chest pain or palpitations.  GI:  Negative for abdominal discomfort, blood in stools or black stools or change in bowel habits.  MUSCULOSKELETAL:  See HPI.  SKIN:  Negative for lesions, rash, and itching.  PSYCH:  No mood disorder or recent  "psychosocial stressors.   HEMATOLOGY/LYMPHOLOGY:  Negative for prolonged bleeding, bruising easily or swollen nodes.    NEURO:   No history of syncope, paralysis, seizures or tremors.  All other reviewed and negative other than HPI.    OBJECTIVE:    Resp 17   Ht 5' 2" (1.575 m)   Wt 77 kg (169 lb 12.1 oz)   BMI 31.05 kg/m²       Physical Exam    GENERAL: Well appearing, in no acute distress, alert and oriented x3.  PSYCH:  Mood and affect appropriate.  SKIN: Skin color, texture, turgor normal, no rashes or lesions.  HEAD/FACE:  Normocephalic, atraumatic. Cranial nerves grossly intact.    NECK: No pain to palpation over the cervical paraspinous muscles. Spurling Negative. No pain with neck flexion, extension, or lateral flexion.   Normaltesting biceps, triceps and brachioradialis bilaterally.    NegativeHoffmann's bilaterally.    5/5 strength testing deltoid, biceps, triceps, wrist extensor, wrist flexor and ulnar intrinsics bilaterally.    Normal  strength bilaterally    CV: RRR with palpation of the radial artery.  PULM: No evidence of respiratory difficulty, symmetric chest rise.  GI:  Soft and non-tender.    NEURO: Bilateral upper and lower extremity coordination and muscle stretch reflexes are physiologic and symmetric. No loss of sensation is noted.  GAIT: normal.    Imaging:   None      ASSESSMENT: 53 y.o. year old female with head pain, consistent with     1. Intractable migraine with status migrainosus, unspecified migraine type  onabotulinumtoxina injection 200 Units    Prior authorization Order    MRI Brain W WO Contrast      2. Myofascial pain          PLAN:   - Interventions: Schedule repeat botox in 3 months: 07/09/2024    Botox Treatments:  -08/21/2023  -04/09/2024    Patient is a candidate for Botox treatment secondary to greater than 15 migraine headaches per month, lasting 3-5 days in duration with failed response to agents including nonsteroidal anti-inflammatory medications, Imitrex, " Topamax and propranolol.  Patient notes 80% improvement in intensity, frequency of headaches with initiation of Botox treatments.    Pre-Op Diagnosis: chronic intractable migraine    Post-Op Diagnosis:  Same     Anticipated Botox mapping:        - muscles:  5 units bilaterally, 10 units total  -Procerus muscle: 5 units  -Frontalis muscle:  4 sites, 5 units each, 20 units total  -Temporalis muscle: 5 units , 4 sites bilaterally, 20 units total  -Occipitalis muscle: 5 units, 3 sites bilaterally, 30 units total  -Trapezius muscles:  5 units, 3 sites bilaterally, 30 units total  -cervical paraspinous:  5 units, 2 sites bilaterally, 20 units total    Total: 135 Units  Waste: 65 Units    - Anticoagulation use: Yes aspirin     report:  Reviewed and consistent with medication use as prescribed.    - Medications:  -Continue propranolol, Ubrelvy per Ms. Harris, NP    -We have discussed temporary prescription for Fioricet (butalbital acetaminophen caffeine 50, 325, 40 mg) to help with migraine headaches.  Patient can take this medication every 6-8 hours as needed for severe headache.  30 tablets dispensed, 2 refills.    - Therapy:   -We discussed continuing physician directed at home physical therapy to help manage the patient/s painful condition. The patient was counseled that muscle strengthening will improve the long term prognosis in regards to pain and may also help increase range of motion and mobility. .      -Imaging:  MRI brain W WO to better evaluate headaches    - Consults/Referrals: (PRN)  -Neurology: migraine [facility]; Dr. Santos Tilley, MA contacted to schedule    - Follow up visit: return to clinic in 3 months. Extended visit. Botox.       The above plan and management options were discussed at length with patient. Patient is in agreement with the above and verbalized understanding.    - I discussed the goals of interventional chronic pain management with the patient on today's visit. We discussed a  multimodal and systematic approach to pain.  This includes diagnostic and therapeutic injections, adjuvant pharmacologic treatment, physical therapy, and at times psychiatry.  I emphasized the importance of regular exercise, core strengthening and stretching, diet and weight loss as a cornerstone of long-term pain management.    - This condition does not require this patient to take time off of work, and the primary goal of our Pain Management services is to improve the patient's functional capacity.  - Patient Questions: Answered all of the patient's questions regarding diagnoses, therapy, treatment and next steps        Kale Jean-Baptiste MD  Interventional Pain Management  [facility]    Disclaimer:  This note was prepared using voice recognition system and is likely to have sound alike errors that may have been overlooked even after proof reading.  Please call me with any questions

## 2024-04-09 ENCOUNTER — OFFICE VISIT (OUTPATIENT)
Dept: PAIN MEDICINE | Facility: CLINIC | Age: 54
End: 2024-04-09
Payer: COMMERCIAL

## 2024-04-09 VITALS — WEIGHT: 169.75 LBS | BODY MASS INDEX: 31.24 KG/M2 | HEIGHT: 62 IN | RESPIRATION RATE: 17 BRPM

## 2024-04-09 DIAGNOSIS — M79.18 MYOFASCIAL PAIN: ICD-10-CM

## 2024-04-09 DIAGNOSIS — G43.911 INTRACTABLE MIGRAINE WITH STATUS MIGRAINOSUS, UNSPECIFIED MIGRAINE TYPE: Primary | ICD-10-CM

## 2024-04-09 PROCEDURE — 99214 OFFICE O/P EST MOD 30 MIN: CPT | Mod: 25,S$GLB,, | Performed by: ANESTHESIOLOGY

## 2024-04-09 PROCEDURE — 3008F BODY MASS INDEX DOCD: CPT | Mod: CPTII,S$GLB,, | Performed by: ANESTHESIOLOGY

## 2024-04-09 PROCEDURE — 99999 PR PBB SHADOW E&M-EST. PATIENT-LVL V: CPT | Mod: PBBFAC,,, | Performed by: ANESTHESIOLOGY

## 2024-04-09 PROCEDURE — 64615 CHEMODENERV MUSC MIGRAINE: CPT | Mod: S$GLB,,, | Performed by: ANESTHESIOLOGY

## 2024-04-09 PROCEDURE — 1159F MED LIST DOCD IN RCRD: CPT | Mod: CPTII,S$GLB,, | Performed by: ANESTHESIOLOGY

## 2024-04-09 RX ORDER — BUTALBITAL, ACETAMINOPHEN AND CAFFEINE 50; 325; 40 MG/1; MG/1; MG/1
1 TABLET ORAL EVERY 4 HOURS PRN
Qty: 30 TABLET | Refills: 2 | Status: SHIPPED | OUTPATIENT
Start: 2024-04-09 | End: 2024-05-09

## 2024-04-23 ENCOUNTER — LAB VISIT (OUTPATIENT)
Dept: LAB | Facility: HOSPITAL | Age: 54
End: 2024-04-23
Attending: PSYCHIATRY & NEUROLOGY
Payer: COMMERCIAL

## 2024-04-23 ENCOUNTER — OFFICE VISIT (OUTPATIENT)
Dept: NEUROLOGY | Facility: CLINIC | Age: 54
End: 2024-04-23
Payer: COMMERCIAL

## 2024-04-23 VITALS
WEIGHT: 171.94 LBS | BODY MASS INDEX: 31.64 KG/M2 | HEIGHT: 62 IN | SYSTOLIC BLOOD PRESSURE: 145 MMHG | HEART RATE: 61 BPM | DIASTOLIC BLOOD PRESSURE: 80 MMHG

## 2024-04-23 DIAGNOSIS — U09.9 PERSISTENT FATIGUE AFTER COVID-19: ICD-10-CM

## 2024-04-23 DIAGNOSIS — G47.00 INSOMNIA, UNSPECIFIED TYPE: ICD-10-CM

## 2024-04-23 DIAGNOSIS — R53.83 PERSISTENT FATIGUE AFTER COVID-19: ICD-10-CM

## 2024-04-23 DIAGNOSIS — G43.709 CHRONIC MIGRAINE W/O AURA W/O STATUS MIGRAINOSUS, NOT INTRACTABLE: Primary | ICD-10-CM

## 2024-04-23 LAB
BASOPHILS # BLD AUTO: 0.02 K/UL (ref 0–0.2)
BASOPHILS NFR BLD: 0.3 % (ref 0–1.9)
DIFFERENTIAL METHOD BLD: NORMAL
EOSINOPHIL # BLD AUTO: 0.1 K/UL (ref 0–0.5)
EOSINOPHIL NFR BLD: 1 % (ref 0–8)
ERYTHROCYTE [DISTWIDTH] IN BLOOD BY AUTOMATED COUNT: 13.4 % (ref 11.5–14.5)
FERRITIN SERPL-MCNC: 77 NG/ML (ref 20–300)
HCT VFR BLD AUTO: 42.5 % (ref 37–48.5)
HGB BLD-MCNC: 13.6 G/DL (ref 12–16)
IMM GRANULOCYTES # BLD AUTO: 0.02 K/UL (ref 0–0.04)
IMM GRANULOCYTES NFR BLD AUTO: 0.3 % (ref 0–0.5)
LYMPHOCYTES # BLD AUTO: 2.5 K/UL (ref 1–4.8)
LYMPHOCYTES NFR BLD: 32.1 % (ref 18–48)
MCH RBC QN AUTO: 29.7 PG (ref 27–31)
MCHC RBC AUTO-ENTMCNC: 32 G/DL (ref 32–36)
MCV RBC AUTO: 93 FL (ref 82–98)
MONOCYTES # BLD AUTO: 0.9 K/UL (ref 0.3–1)
MONOCYTES NFR BLD: 11.3 % (ref 4–15)
NEUTROPHILS # BLD AUTO: 4.3 K/UL (ref 1.8–7.7)
NEUTROPHILS NFR BLD: 55 % (ref 38–73)
NRBC BLD-RTO: 0 /100 WBC
PLATELET # BLD AUTO: 280 K/UL (ref 150–450)
PMV BLD AUTO: 11.7 FL (ref 9.2–12.9)
RBC # BLD AUTO: 4.58 M/UL (ref 4–5.4)
TSH SERPL DL<=0.005 MIU/L-ACNC: 0.96 UIU/ML (ref 0.4–4)
WBC # BLD AUTO: 7.87 K/UL (ref 3.9–12.7)

## 2024-04-23 PROCEDURE — 99214 OFFICE O/P EST MOD 30 MIN: CPT | Mod: S$GLB,,, | Performed by: PSYCHIATRY & NEUROLOGY

## 2024-04-23 PROCEDURE — 99999 PR PBB SHADOW E&M-EST. PATIENT-LVL IV: CPT | Mod: PBBFAC,,, | Performed by: PSYCHIATRY & NEUROLOGY

## 2024-04-23 PROCEDURE — 3079F DIAST BP 80-89 MM HG: CPT | Mod: CPTII,S$GLB,, | Performed by: PSYCHIATRY & NEUROLOGY

## 2024-04-23 PROCEDURE — 3077F SYST BP >= 140 MM HG: CPT | Mod: CPTII,S$GLB,, | Performed by: PSYCHIATRY & NEUROLOGY

## 2024-04-23 PROCEDURE — 36415 COLL VENOUS BLD VENIPUNCTURE: CPT | Performed by: PSYCHIATRY & NEUROLOGY

## 2024-04-23 PROCEDURE — 84443 ASSAY THYROID STIM HORMONE: CPT | Performed by: PSYCHIATRY & NEUROLOGY

## 2024-04-23 PROCEDURE — 82728 ASSAY OF FERRITIN: CPT | Performed by: PSYCHIATRY & NEUROLOGY

## 2024-04-23 PROCEDURE — 85025 COMPLETE CBC W/AUTO DIFF WBC: CPT | Performed by: PSYCHIATRY & NEUROLOGY

## 2024-04-23 PROCEDURE — 3008F BODY MASS INDEX DOCD: CPT | Mod: CPTII,S$GLB,, | Performed by: PSYCHIATRY & NEUROLOGY

## 2024-04-23 PROCEDURE — 1159F MED LIST DOCD IN RCRD: CPT | Mod: CPTII,S$GLB,, | Performed by: PSYCHIATRY & NEUROLOGY

## 2024-04-23 RX ORDER — ZOLPIDEM TARTRATE 5 MG/1
5 TABLET ORAL NIGHTLY PRN
Qty: 10 TABLET | Refills: 0 | Status: SHIPPED | OUTPATIENT
Start: 2024-04-23 | End: 2024-10-22

## 2024-04-23 RX ORDER — FLURBIPROFEN 100 MG/1
100 TABLET, FILM COATED ORAL 3 TIMES DAILY PRN
Qty: 9 TABLET | Refills: 12 | Status: SHIPPED | OUTPATIENT
Start: 2024-04-23

## 2024-04-23 NOTE — PROGRESS NOTES
"Subjective     Patient ID: Altagracia Islas is a 53 y.o. female.    Chief Complaint: Insomnia, unspecified type    HPI  Today has a severe migraine (woke up with the migraine)   Ubrelvy helped reduce 10-> 5/10  Unknown trigger (possible fatigued)  Hz = 1 / month   Works as      [place] [employer] [place]:   Altagracia Islas is a 53 y.o. female with past medical history significant for migraine HA, CAD s/p MI s/p PTCA who presents to the clinic for the evaluation of headaches.  Patient reports diagnosis of migraines exceeding 20 years in duration.  Patient reports her migraines occur at least 2-3 days per week and lasts in between 3-5 days in duration.  She reports greater than 15 days per month with migraine headaches.  Pain is typically in the frontotemporal, retro-orbital and periauricular territories.  Patient reports when present headaches are typically unilateral but can vary from right to left.  Pain today is rated 0/10 but at its worse is a 10/10.  Pain is described as dull and throbbing and pounding in nature.  Headaches are compounded by diplopia, nausea, photophobia, phonophobia and neck stiffness.  Headaches can be brought on by weather or stress.  Pain is marginally improved with rest as well as taking propranolol 3 times daily.  Patient has failed to have improvement with Imitrex and Topamax in the past.  Of note patient reports complex prior history of having trifecta of dizziness, migraines and chest pain with echo and stress test unremarkable.  Patient had a heart catheterization demonstrating a blood clot in the right coronary artery." Since that time patient reports she has been advised to avoid migraine medications and birth control, which may have been contributing to her symptoms as well.  Today she denies more distal radiculopathy into the upper extremities or hands, compromise in hand  strength or dexterity.  Of note patient has not had a follow-up with Neurology since 2019 during her cardiac workup. "  Patient has performed physician directed cervical traction and strengthening exercises over the last 8 weeks without meaningful improvement in her headaches or pain.    Headache history:   Age of onset -  teens, worse in last 3 months   Location - occipital, frontal   Nature of pain -  throbbing   Prodrome - no   Aura -  no   Duration of headache - > 4 hrs   Time to peak intensity - hrs   Pain scale - range of intensity -  10/10  Frequency -  2-3/m  Status Migrainosus history - yes   Time of day of most headaches- anytime     Associated symptoms with the headache:   Meningeal symptoms - photophobia, phonophobia, exercise intolerance +   Nausea/vomitting +   Nasal drainage   Visual blurriness +    Pallor/flushing  Dizziness +   Vertigo  Confusion  Impaired concentration +   Pain worsened with physical activity +   Neck pain +     Cluster headache symptoms: none     Headache Triggers: stress      Other comorbid conditions:  Anxiety - no   Motion sickness symptom - no     Treatment history:  Resolution of headache with sleep - yes   Meds tried:  Propranolol - helps   Ubrelvy - not tried   Triptans contraindicated - heart failure   Topamax - not help     Medication List with Changes/Refills   Current Medications    ASPIRIN (ECOTRIN) 81 MG EC TABLET    Take 81 mg by mouth.       Start Date: --        End Date: --    BENZONATATE (TESSALON PERLES) 100 MG CAPSULE    Take 2 capsules (200 mg total) by mouth 3 (three) times daily as needed for Cough.       Start Date: 7/27/2022 End Date: --    BUTALBITAL-ACETAMINOPHEN-CAFFEINE -40 MG (FIORICET, ESGIC) -40 MG PER TABLET    Take 1 tablet by mouth every 4 (four) hours as needed for Pain.       Start Date: 4/9/2024  End Date: 5/9/2024    CYCLOBENZAPRINE (FLEXERIL) 10 MG TABLET    Take 1 tablet (10 mg total) by mouth 3 (three) times daily.       Start Date: 1/9/2024  End Date: --    DEXBROMPHENIRAMINE-PHENYLEPH (ALAHIST PE) 2-7.5 MG TAB    Take 1 tablet by mouth every  6 (six) hours as needed (sinus congestion).       Start Date: 7/13/2023 End Date: --    FLUTICASONE (FLONASE) 50 MCG/ACTUATION NASAL SPRAY    fluticasone 50 mcg/actuation nasal spray,suspension   Spray 2 sprays every day by intranasal route.       Start Date: --        End Date: --    GABAPENTIN (NEURONTIN) 100 MG CAPSULE    Take 1 capsule (100 mg total) by mouth 2 (two) times daily.       Start Date: 7/25/2023 End Date: 7/24/2024    KETOCONAZOLE (NIZORAL) 2 % CREAM    APPLY ON THE SKIN BID PRN.  USE ON THE FACE       Start Date: 6/9/2017  End Date: --    LORATADINE (CLARITIN) 10 MG TABLET    Take 1 tablet (10 mg total) by mouth once daily.       Start Date: 12/13/2019End Date: 4/23/2024    METHYLPREDNISOLONE (MEDROL DOSEPACK) 4 MG TABLET    TAKE AS DIRECTED       Start Date: 7/13/2023 End Date: --    OXYCODONE-ACETAMINOPHEN (PERCOCET) 7.5-325 MG PER TABLET    Take 1 tablet by mouth every 6 (six) hours as needed for Pain.       Start Date: 7/13/2023 End Date: --    PROPRANOLOL (INDERAL) 20 MG TABLET    propranolol       Start Date: --        End Date: --    TRIAMCINOLONE ACETONIDE 0.1% (KENALOG) 0.1 % OINTMENT           Start Date: 6/9/2017  End Date: --    UBROGEPANT (UBRELVY) 100 MG TABLET    Take 1 tablet (100 mg total) by mouth as needed for Migraine. If symptoms persist or return, may repeat dose after 2 hours. Maximum: 200 mg per 24 hours       Start Date: 3/28/2024 End Date: --    ZOLPIDEM (AMBIEN) 5 MG TAB    Take 1 tablet (5 mg total) by mouth nightly as needed (insomnia).       Start Date: 7/25/2023 End Date: 4/23/2024       Headache risk factors:   H/o TBI  - no   H/o Meningitis  - no   F/h Aneurysms  - no      Review of Systems   Constitutional: Negative.    HENT: Negative.     Eyes: Negative.    Respiratory: Negative.     Cardiovascular: Negative.    Gastrointestinal: Negative.    Endocrine: Negative.    Genitourinary: Negative.    Musculoskeletal: Negative.    Integumentary:  Negative.    Allergic/Immunologic: Negative.    Neurological: Negative.    Hematological: Negative.    Psychiatric/Behavioral:  Positive for sleep disturbance.           Objective     Physical Exam  Constitutional:       Appearance: Normal appearance.   HENT:      Head: Normocephalic and atraumatic.      Right Ear: External ear normal.      Left Ear: External ear normal.      Nose: Nose normal.      Mouth/Throat:      Mouth: Mucous membranes are moist.   Eyes:      Extraocular Movements: Extraocular movements intact.      Conjunctiva/sclera: Conjunctivae normal.      Pupils: Pupils are equal, round, and reactive to light.   Cardiovascular:      Rate and Rhythm: Normal rate.      Pulses: Normal pulses.   Pulmonary:      Effort: Pulmonary effort is normal.   Musculoskeletal:         General: Normal range of motion.      Cervical back: Normal range of motion.   Skin:     General: Skin is warm.   Neurological:      General: No focal deficit present.      Mental Status: She is alert and oriented to person, place, and time.      Cranial Nerves: No cranial nerve deficit.      Sensory: No sensory deficit.      Motor: No weakness.      Coordination: Coordination normal.      Gait: Gait normal.   Psychiatric:         Mood and Affect: Mood normal.         Behavior: Behavior normal.         Thought Content: Thought content normal.         Judgment: Judgment normal.          Assessment and Plan     1. Chronic migraine w/o aura w/o status migrainosus, not intractable    1, Prophylactic medication - gabapentin BID, propanolol TID  Consider mAbs  2, Breakthrough headache - percocet, ubrelvy, flurbiprofen   PRN ambien  Multiple alternative treatment options were offered to the patient  3. Refrain from over counter pain medications. Discussed medication overuse headache.   4. I urged the patient to keep a headache calender    Labs today for fatigue     Patient verbalized understanding to plan. I answered all her questions to her  satisfaction.         No follow-ups on file.

## 2024-05-01 ENCOUNTER — HOSPITAL ENCOUNTER (OUTPATIENT)
Dept: RADIOLOGY | Facility: HOSPITAL | Age: 54
Discharge: HOME | End: 2024-05-01
Attending: ANESTHESIOLOGY
Payer: COMMERCIAL

## 2024-05-01 DIAGNOSIS — G43.911 INTRACTABLE MIGRAINE WITH STATUS MIGRAINOSUS, UNSPECIFIED MIGRAINE TYPE: ICD-10-CM

## 2024-05-01 PROCEDURE — A9585 GADOBUTROL INJECTION: HCPCS | Performed by: ANESTHESIOLOGY

## 2024-05-01 PROCEDURE — 70553 MRI BRAIN STEM W/O & W/DYE: CPT | Mod: TC

## 2024-05-01 PROCEDURE — 25500020 PHARM REV CODE 255: Performed by: ANESTHESIOLOGY

## 2024-05-01 PROCEDURE — 70553 MRI BRAIN STEM W/O & W/DYE: CPT | Mod: 26,,, | Performed by: STUDENT IN AN ORGANIZED HEALTH CARE EDUCATION/TRAINING PROGRAM

## 2024-05-01 RX ORDER — GADOBUTROL 604.72 MG/ML
8 INJECTION INTRAVENOUS
Status: COMPLETED | OUTPATIENT
Start: 2024-05-01 | End: 2024-05-01

## 2024-05-01 RX ADMIN — GADOBUTROL 8 ML: 604.72 INJECTION INTRAVENOUS at 12:05

## 2024-07-08 NOTE — PROGRESS NOTES
Established Patient Interventional Pain Note     Referring Physician: Kale Jean-Baptiste MD    PCP: Atiya, Primary Doctor    Chief Complaint:   Chief Complaint   Patient presents with    Headache        SUBJECTIVE:  Interval history 07/09/2024  Patient presents for MRI brain with and without contrast review and for Botox procedure:  Patient reports significant improvement in frequency, intensity of migraine headaches with initiation of Botox treatment.  Patient reports 80% improvement.  She reports headaches may return approximately after 2-1/2 months.  Even with recurrence, these headaches tend to be less intense with shorter durations.  She does report pain along primarily frontotemporal and cervical paraspinous musculature when headaches are present.  Patient has not needed to use Fioricet medication during this period of time.    Of note patient followed with Dr. Tilley 04/23/2024 with the following evaluation:      Patient has been taking Ambien as needed by Dr. Tilley to help with sleep hygiene.  She has inquiring about a refill of this medication.    PROCEDURE:  Informed consent was obtained and the procedure was described to the patient, a timeout was performed prior to starting the procedure.    Surgeon: Kale Jean-Baptiste MD      Pre-Op Diagnosis: chronic intractable migraine    Post-Op Diagnosis:  Same    Procedure:  Botox injections for headache    EBL: None    Complications: None    Specimens: None    Description of Procedure:    The patient was placed in the sitting position and the areas over the planned injections were all prepped with alcohol.  botox was reconstituted in preservative free normal saline, 100 units was placed in 2 mLof saline on a 30-gauge needle.  5 Units each was placed into the following muscles:       Botox mapping:        - muscles:  5 units bilaterally, 10 units total  -Procerus muscle: 5 units  -Frontalis muscle:  4 sites, 5 units each, 20 units total  -Temporalis muscle: 5 units ,  4 sites bilaterally, 20 units total  -Occipitalis muscle: 5 units, 3 sites bilaterally, 30 units total  -Trapezius muscles:  5 units, 3 sites bilaterally, 30 units total  -cervical paraspinous:  5 units, 2 sites bilaterally, 20 units total    Total: 135 Units  Waste: 65 Units    Patient tolerated the procedure well and bleeding was nil no Band-Aids were applied the patient was discharged in stable condition.      Interval history 04/09/2024  Patient presents for Botox procedure:  Patient reports significant improvement in frequency, intensity of migraine headaches with initiation of Botox treatment.  Patient reports 80% improvement.  She reports headaches may return approximately after 2-1/2 months.  Even with recurrence, these headaches tend to be less intense with shorter durations.  She does report pain along primarily frontotemporal and cervical paraspinous musculature when headaches are present.  She reports stress has been increased as her mother-in-law has been sick in [place].  Patient continues to work 2 jobs during this period as well.  She does report difficulty scheduling follow with Dr. Mckeon, with whom she discussed headaches and insomnia.     PROCEDURE:  Informed consent was obtained and the procedure was described to the patient, a timeout was performed prior to starting the procedure.    Surgeon: Kale Jean-Baptiste MD      Pre-Op Diagnosis: chronic intractable migraine    Post-Op Diagnosis:  Same    Procedure:  Botox injections for headache    EBL: None    Complications: None    Specimens: None    Description of Procedure:    The patient was placed in the sitting position and the areas over the planned injections were all prepped with alcohol.  botox was reconstituted in preservative free normal saline, 100 units was placed in 2 mLof saline on a 30-gauge needle.  5 Units each was placed into the following muscles:       Botox mapping:        - muscles:  5 units bilaterally, 10 units  total  -Procerus muscle: 5 units  -Frontalis muscle:  4 sites, 5 units each, 20 units total  -Temporalis muscle: 5 units , 4 sites bilaterally, 20 units total  -Occipitalis muscle: 5 units, 3 sites bilaterally, 30 units total  -Trapezius muscles:  5 units, 3 sites bilaterally, 30 units total  -cervical paraspinous:  5 units, 2 sites bilaterally, 20 units total    Total: 135 Units    Patient tolerated the procedure well and bleeding was nil no Band-Aids were applied the patient was discharged in stable condition.          Interval history 08/21/2023   Patient presents for Botox treatment. Patient reports diagnosis of migraines exceeding 20 years in duration.  Patient reports her migraines occur at least 2-3 days per week and lasts in between 3-5 days in duration.  She reports greater than 15 days per month with migraine headaches.  Pain is typically in the frontotemporal, retro-orbital and periauricular territories.  Patient reports when present headaches are typically unilateral but can vary from right to left.  Pain today is rated 0/10 but at its worse is a 10/10.  Pain is described as dull and throbbing and pounding in nature.  Headaches are compounded by diplopia, nausea, photophobia, phonophobia and neck stiffness.  Headaches can be brought on by weather or stress.  Pain is marginally improved with rest as well as taking propranolol 3 times daily.  Patient has failed to have improvement with Imitrex and Topamax in the past    PROCEDURE:  Informed consent was obtained and the procedure was described to the patient, a timeout was performed prior to starting the procedure.    Surgeon: Kale Jean-Baptiste MD      Pre-Op Diagnosis: chronic intractable migraine, atypical cluster headache    Post-Op Diagnosis:  Same    Procedure:  Botox injections for headache    EBL: None    Complications: None    Specimens: None    Description of Procedure:    The patient was placed in the sitting position and the areas over the planned  injections were all prepped with alcohol.  botox was reconstituted in preservative free normal saline, 100 units was placed in 2 mLof saline on a 30-gauge needle.  5 Units each was placed into the following muscles:       Botox mapping:        - muscles:  5 units bilaterally, 10 units total  -Procerus muscle: 5 units  -Frontalis muscle:  4 sites, 5 units each, 20 units total  -Temporalis muscle: 5 units , 4 sites bilaterally, 20 units total  -Occipitalis muscle: 5 units, 3 sites bilaterally, 30 units total  -Trapezius muscles:  5 units, 3 sites bilaterally, 30 units total  -cervical paraspinous:  5 units, 2 sites bilaterally, 20 units total    Total: 135 Units  Waste: 65 Units    Patient tolerated the procedure well and bleeding was nil no Band-Aids were applied the patient was discharged in stable condition.        HPI 07/24/2023  Altagracia Islas is a 53 y.o. female with past medical history significant for migraine HA, CAD s/p MI s/p PTCA who presents to the clinic for the evaluation of headaches.  Patient reports diagnosis of migraines exceeding 20 years in duration.  Patient reports her migraines occur at least 2-3 days per week and lasts in between 3-5 days in duration.  She reports greater than 15 days per month with migraine headaches.  Pain is typically in the frontotemporal, retro-orbital and periauricular territories.  Patient reports when present headaches are typically unilateral but can vary from right to left.  Pain today is rated 0/10 but at its worse is a 10/10.  Pain is described as dull and throbbing and pounding in nature.  Headaches are compounded by diplopia, nausea, photophobia, phonophobia and neck stiffness.  Headaches can be brought on by weather or stress.  Pain is marginally improved with rest as well as taking propranolol 3 times daily.  Patient has failed to have improvement with Imitrex and Topamax in the past.  Of note patient reports complex prior history of having trifecta  "of dizziness, migraines and chest pain with echo and stress test unremarkable.  Patient had a heart catheterization demonstrating a blood clot in the right coronary artery." Since that time patient reports she has been advised to avoid migraine medications and birth control, which may have been contributing to her symptoms as well.  Today she denies more distal radiculopathy into the upper extremities or hands, compromise in hand  strength or dexterity.  Of note patient has not had a follow-up with Neurology since 2019 during her cardiac workup.  Patient has performed physician directed cervical traction and strengthening exercises over the last 8 weeks without meaningful improvement in her headaches or pain.    Patient denies night fever/night sweats, urinary incontinence, bowel incontinence, significant weight loss, significant motor weakness, and loss of sensations.    Pain Disability Index Review:         2024    12:32 PM 2024     1:41 PM 2024    12:17 PM   Last 3 PDI Scores   Pain Disability Index (PDI) 38 14 5       Non-Pharmacologic Treatments:  Physical Therapy/Home Exercise: yes  Ice/Heat:yes  TENS: no  Acupuncture: no  Massage: no  Chiropractic: no    Other: no      Pain Medications:  - Opioids: Percocet (Oxycodone/Acetaminophen)  - Adjuvant Medications: Cyclobenzaprine (Flexeril)  - Anti-Coagulants: Aspirin    Pain Procedures:   None    Past Medical History:   Diagnosis Date    Heart attack     Hx of heart artery stent     Migraine     Skin abnormalities      Past Surgical History:   Procedure Laterality Date    BREAST SURGERY       SECTION      HYSTERECTOMY      paritial    TOTAL REDUCTION MAMMOPLASTY       Review of patient's allergies indicates:   Allergen Reactions    Latex      Other reaction(s): Rash, facial       Current Outpatient Medications   Medication Sig    aspirin (ECOTRIN) 81 MG EC tablet Take 81 mg by mouth.    benzonatate (TESSALON PERLES) 100 MG capsule Take 2 " capsules (200 mg total) by mouth 3 (three) times daily as needed for Cough.    cyclobenzaprine (FLEXERIL) 10 MG tablet Take 1 tablet (10 mg total) by mouth 3 (three) times daily.    dexbrompheniramine-phenyleph (ALAHIST PE) 2-7.5 mg Tab Take 1 tablet by mouth every 6 (six) hours as needed (sinus congestion).    flurbiprofen (ANSAID) 100 MG tablet Take 1 tablet (100 mg total) by mouth 3 (three) times daily as needed (migraine).    fluticasone (FLONASE) 50 mcg/actuation nasal spray fluticasone 50 mcg/actuation nasal spray,suspension   Spray 2 sprays every day by intranasal route.    gabapentin (NEURONTIN) 100 MG capsule Take 1 capsule (100 mg total) by mouth 2 (two) times daily.    ketoconazole (NIZORAL) 2 % cream APPLY ON THE SKIN BID PRN.  USE ON THE FACE    loratadine (CLARITIN) 10 mg tablet Take 1 tablet (10 mg total) by mouth once daily.    methylPREDNISolone (MEDROL DOSEPACK) 4 mg tablet TAKE AS DIRECTED    oxyCODONE-acetaminophen (PERCOCET) 7.5-325 mg per tablet Take 1 tablet by mouth every 6 (six) hours as needed for Pain.    propranolol (INDERAL) 20 MG tablet propranolol    triamcinolone acetonide 0.1% (KENALOG) 0.1 % ointment     ubrogepant (UBRELVY) 100 mg tablet Take 1 tablet (100 mg total) by mouth as needed for Migraine. If symptoms persist or return, may repeat dose after 2 hours. Maximum: 200 mg per 24 hours    zolpidem (AMBIEN) 5 MG Tab Take 1 tablet (5 mg total) by mouth nightly as needed (insomnia).     Current Facility-Administered Medications   Medication    onabotulinumtoxina injection 200 Units       Review of Systems     GENERAL:  No weight loss, malaise or fevers.  HEENT:   No recent changes in vision or hearing  NECK:  Negative for lumps, no difficulty with swallowing.  RESPIRATORY:  Negative for cough, wheezing or shortness of breath, patient denies any recent URI.  CARDIOVASCULAR:  Negative for chest pain or palpitations.  GI:  Negative for abdominal discomfort, blood in stools or black  "stools or change in bowel habits.  MUSCULOSKELETAL:  See HPI.  SKIN:  Negative for lesions, rash, and itching.  PSYCH:  No mood disorder or recent psychosocial stressors.   HEMATOLOGY/LYMPHOLOGY:  Negative for prolonged bleeding, bruising easily or swollen nodes.    NEURO:   No history of syncope, paralysis, seizures or tremors.  All other reviewed and negative other than HPI.    OBJECTIVE:    BP (!) 161/84   Pulse 70   Ht 5' 2" (1.575 m)   Wt 78 kg (171 lb 15.3 oz)   BMI 31.45 kg/m²       Physical Exam    GENERAL: Well appearing, in no acute distress, alert and oriented x3.  PSYCH:  Mood and affect appropriate.  SKIN: Skin color, texture, turgor normal, no rashes or lesions.  HEAD/FACE:  Normocephalic, atraumatic. Cranial nerves grossly intact.    NECK: No pain to palpation over the cervical paraspinous muscles. Spurling Negative. No pain with neck flexion, extension, or lateral flexion.   Normaltesting biceps, triceps and brachioradialis bilaterally.    NegativeHoffmann's bilaterally.    5/5 strength testing deltoid, biceps, triceps, wrist extensor, wrist flexor and ulnar intrinsics bilaterally.    Normal  strength bilaterally    CV: RRR with palpation of the radial artery.  PULM: No evidence of respiratory difficulty, symmetric chest rise.  GI:  Soft and non-tender.    NEURO: Bilateral upper and lower extremity coordination and muscle stretch reflexes are physiologic and symmetric. No loss of sensation is noted.  GAIT: normal.    Imaging:   MRI brain with and without contrast 05/01/2024  FINDINGS:  Cerebral and ventricular volumes are within normal limits for the patient's age.  No evidence of hydrocephalus.     No evidence of acute territorial infarct, intracranial hemorrhage, or mass lesion.  No abnormal enhancement, restricted diffusion, or susceptibility artifact.  No significant midline shift or mass effect.     Midline structures and posterior fossa structures are unremarkable.  Basal cisterns are " clear.  Major vascular flow voids are unremarkable.     Cranium is unremarkable.  Orbits and globes are within normal limits.  Paranasal sinuses and mastoid air cells are clear.     Impression:     Unremarkable MR appearance of the brain.  No acute findings or abnormal enhancement.    ASSESSMENT: 53 y.o. year old female with head pain, consistent with     1. Intractable migraine with status migrainosus, unspecified migraine type  Prior authorization Order      2. Myofascial pain            PLAN:   - Interventions: Schedule repeat botox in 3 months: 10/15/2024    Botox Treatments:  -08/21/2023  -04/09/2024  -07/09/2024    Patient is a candidate for Botox treatment secondary to greater than 15 migraine headaches per month, lasting 3-5 days in duration with failed response to agents including nonsteroidal anti-inflammatory medications, Imitrex, Topamax and propranolol.  Patient notes 80% improvement in intensity, frequency of headaches with initiation of Botox treatments.    Pre-Op Diagnosis: chronic intractable migraine    Post-Op Diagnosis:  Same     Anticipated Botox mapping:        - muscles:  5 units bilaterally, 10 units total  -Procerus muscle: 5 units  -Frontalis muscle:  4 sites, 5 units each, 20 units total  -Temporalis muscle: 5 units , 4 sites bilaterally, 20 units total  -Occipitalis muscle: 5 units, 3 sites bilaterally, 30 units total  -Trapezius muscles:  5 units, 3 sites bilaterally, 30 units total  -cervical paraspinous:  5 units, 2 sites bilaterally, 20 units total    Total: 135 Units  Waste: 65 Units    - Anticoagulation use: Yes aspirin     report:  Reviewed and consistent with medication use as prescribed.    - Medications:  -Continue propranolol, Ubrelvy per Ms. Harris, NP  -Continue Ambien per Dr. Carbajal    -We have discussed temporary prescription for Fioricet (butalbital acetaminophen caffeine 50, 325, 40 mg) to help with migraine headaches.  Patient can take this medication every  6-8 hours as needed for severe headache.     - Therapy:   -We discussed continuing physician directed at home physical therapy to help manage the patient/s painful condition. The patient was counseled that muscle strengthening will improve the long term prognosis in regards to pain and may also help increase range of motion and mobility. .    -Imaging:  Diagnostic imaging (MRI brain with and without contrast) reviewed and discussed with the patient    - Consults/Referrals: (PRN)  -Neurology: migraine [facility]; Dr. Santos Tilley    - Follow up visit: return to clinic in 3 months. Extended visit. Botox.       The above plan and management options were discussed at length with patient. Patient is in agreement with the above and verbalized understanding.    - I discussed the goals of interventional chronic pain management with the patient on today's visit. We discussed a multimodal and systematic approach to pain.  This includes diagnostic and therapeutic injections, adjuvant pharmacologic treatment, physical therapy, and at times psychiatry.  I emphasized the importance of regular exercise, core strengthening and stretching, diet and weight loss as a cornerstone of long-term pain management.    - This condition does not require this patient to take time off of work, and the primary goal of our Pain Management services is to improve the patient's functional capacity.  - Patient Questions: Answered all of the patient's questions regarding diagnoses, therapy, treatment and next steps        Kale Jean-Baptiste MD  Interventional Pain Management  [facility]    Disclaimer:  This note was prepared using voice recognition system and is likely to have sound alike errors that may have been overlooked even after proof reading.  Please call me with any questions

## 2024-07-09 ENCOUNTER — OFFICE VISIT (OUTPATIENT)
Dept: PAIN MEDICINE | Facility: CLINIC | Age: 54
End: 2024-07-09
Payer: COMMERCIAL

## 2024-07-09 VITALS
HEART RATE: 70 BPM | DIASTOLIC BLOOD PRESSURE: 84 MMHG | BODY MASS INDEX: 31.64 KG/M2 | SYSTOLIC BLOOD PRESSURE: 161 MMHG | HEIGHT: 62 IN | WEIGHT: 171.94 LBS

## 2024-07-09 DIAGNOSIS — G43.911 INTRACTABLE MIGRAINE WITH STATUS MIGRAINOSUS, UNSPECIFIED MIGRAINE TYPE: Primary | ICD-10-CM

## 2024-07-09 DIAGNOSIS — M79.18 MYOFASCIAL PAIN: ICD-10-CM

## 2024-07-09 PROCEDURE — 99999 PR PBB SHADOW E&M-EST. PATIENT-LVL V: CPT | Mod: PBBFAC,,, | Performed by: ANESTHESIOLOGY

## 2024-08-13 ENCOUNTER — OFFICE VISIT (OUTPATIENT)
Dept: FAMILY MEDICINE | Facility: CLINIC | Age: 54
End: 2024-08-13
Payer: COMMERCIAL

## 2024-08-13 ENCOUNTER — HOSPITAL ENCOUNTER (OUTPATIENT)
Dept: RADIOLOGY | Facility: HOSPITAL | Age: 54
Discharge: HOME | End: 2024-08-13
Attending: NURSE PRACTITIONER
Payer: COMMERCIAL

## 2024-08-13 VITALS
BODY MASS INDEX: 31.52 KG/M2 | RESPIRATION RATE: 20 BRPM | DIASTOLIC BLOOD PRESSURE: 66 MMHG | SYSTOLIC BLOOD PRESSURE: 118 MMHG | OXYGEN SATURATION: 96 % | TEMPERATURE: 97 F | HEIGHT: 62 IN | HEART RATE: 62 BPM | WEIGHT: 171.31 LBS

## 2024-08-13 DIAGNOSIS — F41.9 ANXIETY: ICD-10-CM

## 2024-08-13 DIAGNOSIS — M79.644 PAIN OF FINGER OF RIGHT HAND: ICD-10-CM

## 2024-08-13 DIAGNOSIS — G47.00 INSOMNIA, UNSPECIFIED TYPE: ICD-10-CM

## 2024-08-13 DIAGNOSIS — Z12.11 SCREEN FOR COLON CANCER: ICD-10-CM

## 2024-08-13 DIAGNOSIS — M79.644 PAIN OF FINGER OF RIGHT HAND: Primary | ICD-10-CM

## 2024-08-13 DIAGNOSIS — Z12.31 ENCOUNTER FOR SCREENING MAMMOGRAM FOR MALIGNANT NEOPLASM OF BREAST: ICD-10-CM

## 2024-08-13 PROCEDURE — 73130 X-RAY EXAM OF HAND: CPT | Mod: 26,RT,, | Performed by: RADIOLOGY

## 2024-08-13 PROCEDURE — 99999 PR PBB SHADOW E&M-EST. PATIENT-LVL V: CPT | Mod: PBBFAC,,, | Performed by: NURSE PRACTITIONER

## 2024-08-13 PROCEDURE — 73130 X-RAY EXAM OF HAND: CPT | Mod: TC,FY,PO,RT

## 2024-08-13 PROCEDURE — 3074F SYST BP LT 130 MM HG: CPT | Mod: CPTII,S$GLB,, | Performed by: NURSE PRACTITIONER

## 2024-08-13 PROCEDURE — 1159F MED LIST DOCD IN RCRD: CPT | Mod: CPTII,S$GLB,, | Performed by: NURSE PRACTITIONER

## 2024-08-13 PROCEDURE — 3008F BODY MASS INDEX DOCD: CPT | Mod: CPTII,S$GLB,, | Performed by: NURSE PRACTITIONER

## 2024-08-13 PROCEDURE — 3078F DIAST BP <80 MM HG: CPT | Mod: CPTII,S$GLB,, | Performed by: NURSE PRACTITIONER

## 2024-08-13 PROCEDURE — 1160F RVW MEDS BY RX/DR IN RCRD: CPT | Mod: CPTII,S$GLB,, | Performed by: NURSE PRACTITIONER

## 2024-08-13 PROCEDURE — 99214 OFFICE O/P EST MOD 30 MIN: CPT | Mod: S$GLB,,, | Performed by: NURSE PRACTITIONER

## 2024-08-13 RX ORDER — ZOLPIDEM TARTRATE 10 MG/1
10 TABLET ORAL NIGHTLY PRN
Qty: 30 TABLET | Refills: 3 | Status: SHIPPED | OUTPATIENT
Start: 2024-08-13 | End: 2025-02-11

## 2024-08-13 RX ORDER — LORAZEPAM 0.5 MG/1
0.5 TABLET ORAL NIGHTLY PRN
Qty: 20 TABLET | Refills: 0 | Status: SHIPPED | OUTPATIENT
Start: 2024-08-13 | End: 2024-09-12

## 2024-08-13 NOTE — PROGRESS NOTES
Altagracia Islas  2024  [id number]    Vita Gardner MD  Patient Care Team:  Vita Gardner MD as PCP - General (Family Medicine)          Visit Type:a scheduled routine follow-up visit    Chief Complaint:  Chief Complaint   Patient presents with    Annual Exam    Hand Pain     Right        History of Present Illness:  55 yo female presents today with co     Fifth digit right hand pain and deformity   Requesting Colonoscopy  Reports her HA have improved and are less frequent   She has been experiencing limited sleep due to increased home stress. She was prescribed ambien and it helped her sleep better and is requesting an increase and refill  High momnets of anxiety     History:  Past Medical History:   Diagnosis Date    Heart attack     Hx of heart artery stent     Migraine     Skin abnormalities      Past Surgical History:   Procedure Laterality Date    BREAST SURGERY       SECTION      HYSTERECTOMY      paritial    TOTAL REDUCTION MAMMOPLASTY       Family History   Problem Relation Name Age of Onset    Breast cancer Maternal Grandmother      Breast cancer Other mat great aunt      Social History     Socioeconomic History    Marital status:    Tobacco Use    Smoking status: Never     Passive exposure: Never    Smokeless tobacco: Never   Substance and Sexual Activity    Alcohol use: No    Drug use: No     Patient Active Problem List   Diagnosis    Chronic migraine w/o aura w/o status migrainosus, not intractable    Cervicogenic headache    Decreased ROM of intervertebral discs of cervical spine    Muscle wasting and atrophy, not elsewhere classified, unspecified site    Persistent fatigue after COVID-19    Insomnia     Review of patient's allergies indicates:   Allergen Reactions    Latex      Other reaction(s): Rash, facial       The following were reviewed at this visit: active problem list, medication list, allergies, family history, social history, and health  maintenance.    Medications:  Current Outpatient Medications on File Prior to Visit   Medication Sig Dispense Refill    aspirin (ECOTRIN) 81 MG EC tablet Take 81 mg by mouth.      cyclobenzaprine (FLEXERIL) 10 MG tablet Take 1 tablet (10 mg total) by mouth 3 (three) times daily. 10 tablet 0    gabapentin (NEURONTIN) 100 MG capsule Take 1 capsule (100 mg total) by mouth 2 (two) times daily. 60 capsule 11    ketoconazole (NIZORAL) 2 % cream APPLY ON THE SKIN BID PRN.  USE ON THE FACE      propranolol (INDERAL) 20 MG tablet propranolol      benzonatate (TESSALON PERLES) 100 MG capsule Take 2 capsules (200 mg total) by mouth 3 (three) times daily as needed for Cough. (Patient not taking: Reported on 8/13/2024) 30 capsule 0    dexbrompheniramine-phenyleph (ALAHIST PE) 2-7.5 mg Tab Take 1 tablet by mouth every 6 (six) hours as needed (sinus congestion). (Patient not taking: Reported on 8/13/2024) 30 tablet 2    flurbiprofen (ANSAID) 100 MG tablet Take 1 tablet (100 mg total) by mouth 3 (three) times daily as needed (migraine). (Patient not taking: Reported on 8/13/2024) 9 tablet 12    fluticasone (FLONASE) 50 mcg/actuation nasal spray fluticasone 50 mcg/actuation nasal spray,suspension   Spray 2 sprays every day by intranasal route. (Patient not taking: Reported on 8/13/2024)      loratadine (CLARITIN) 10 mg tablet Take 1 tablet (10 mg total) by mouth once daily. (Patient not taking: Reported on 8/13/2024) 30 tablet 0    methylPREDNISolone (MEDROL DOSEPACK) 4 mg tablet TAKE AS DIRECTED (Patient not taking: Reported on 8/13/2024) 1 each 0    oxyCODONE-acetaminophen (PERCOCET) 7.5-325 mg per tablet Take 1 tablet by mouth every 6 (six) hours as needed for Pain. (Patient not taking: Reported on 8/13/2024) 10 tablet 0    triamcinolone acetonide 0.1% (KENALOG) 0.1 % ointment  (Patient not taking: Reported on 8/13/2024)      ubrogepant (UBRELVY) 100 mg tablet Take 1 tablet (100 mg total) by mouth as needed for Migraine. If  symptoms persist or return, may repeat dose after 2 hours. Maximum: 200 mg per 24 hours (Patient not taking: Reported on 8/13/2024) 9 tablet 12     Current Facility-Administered Medications on File Prior to Visit   Medication Dose Route Frequency Provider Last Rate Last Admin    onabotulinumtoxina injection 200 Units  200 Units Intramuscular 1 time in Clinic/HOD Kale Jean-Baptiste MD           Medications have been reviewed and reconciled with patient at this visit.  Barriers to medications reviewed with patient.    Adverse reactions to current medications reviewed with patient..    Over the counter medications reviewed and reconciled with patient.    Exam:  Wt Readings from Last 3 Encounters:   08/13/24 77.7 kg (171 lb 4.8 oz)   07/09/24 78 kg (171 lb 15.3 oz)   04/23/24 78 kg (171 lb 15.3 oz)     Temp Readings from Last 3 Encounters:   08/13/24 97.3 °F (36.3 °C) (Tympanic)   09/27/23 97.4 °F (36.3 °C) (Tympanic)   07/13/23 98.6 °F (37 °C) (Oral)     BP Readings from Last 3 Encounters:   08/13/24 118/66   07/09/24 (!) 161/84   04/23/24 (!) 145/80     Pulse Readings from Last 3 Encounters:   08/13/24 62   07/09/24 70   04/23/24 61     Body mass index is 31.33 kg/m².      Review of Systems   Constitutional:  Negative for fever.   Respiratory:  Negative for cough, shortness of breath and wheezing.    Cardiovascular:  Negative for chest pain and palpitations.   Gastrointestinal:  Negative for nausea.   Musculoskeletal:         Right hand fifth digit pain   Neurological:  Negative for speech change, weakness and headaches.   Psychiatric/Behavioral:  The patient is nervous/anxious.    All other systems reviewed and are negative.    Physical Exam  Vitals and nursing note reviewed.   Constitutional:       Appearance: Normal appearance. She is obese.   HENT:      Head: Normocephalic and atraumatic.      Right Ear: Tympanic membrane, ear canal and external ear normal.      Left Ear: Tympanic membrane, ear canal and external ear  normal.      Nose: Nose normal.      Mouth/Throat:      Mouth: Mucous membranes are moist.      Pharynx: Oropharynx is clear.   Eyes:      Extraocular Movements: Extraocular movements intact.      Conjunctiva/sclera: Conjunctivae normal.      Pupils: Pupils are equal, round, and reactive to light.   Cardiovascular:      Rate and Rhythm: Normal rate and regular rhythm.      Pulses: Normal pulses.      Heart sounds: Normal heart sounds.   Pulmonary:      Effort: Pulmonary effort is normal.      Breath sounds: Normal breath sounds.   Abdominal:      General: Bowel sounds are normal.      Palpations: Abdomen is soft.   Musculoskeletal:         General: Normal range of motion.        Arms:       Cervical back: Normal range of motion and neck supple.   Skin:     General: Skin is warm and dry.      Capillary Refill: Capillary refill takes less than 2 seconds.   Neurological:      General: No focal deficit present.      Mental Status: She is alert and oriented to person, place, and time.   Psychiatric:         Attention and Perception: Attention and perception normal.         Mood and Affect: Mood and affect normal. Mood is not anxious.         Speech: Speech normal.         Behavior: Behavior normal. Behavior is cooperative.         Thought Content: Thought content normal.         Cognition and Memory: Cognition and memory normal.         Judgment: Judgment normal.         Laboratory Reviewed ({Yes)  Lab Results   Component Value Date    WBC 7.87 04/23/2024    HGB 13.6 04/23/2024    HCT 42.5 04/23/2024     04/23/2024    CHOL 167 08/09/2022    TRIG 49 08/09/2022    HDL 66 08/09/2022    ALT 17 08/09/2022    AST 16 08/09/2022     08/09/2022    K 3.7 08/09/2022     08/09/2022    CREATININE 0.7 08/09/2022    BUN 7 08/09/2022    CO2 25 08/09/2022    TSH 0.957 04/23/2024    HGBA1C 5.3 08/09/2022       Altagracia was seen today for annual exam and hand pain.    Diagnoses and all orders for this visit:    Pain of  finger of right hand  -     X-Ray Hand Complete Right; Future    Encounter for screening mammogram for malignant neoplasm of breast  -     Mammo Digital Screening Bilat w/ Carlos; Future    Screen for colon cancer  -     Ambulatory referral/consult to Endo Procedure ; Future    Insomnia, unspecified type  -     zolpidem (AMBIEN) 10 mg Tab; Take 1 tablet (10 mg total) by mouth nightly as needed (insomnia).    Anxiety  -     LORazepam (ATIVAN) 0.5 MG tablet; Take 1 tablet (0.5 mg total) by mouth nightly as needed for Anxiety.        Plan   Start ativan for anxiety  2.   Increase ambien   3. Colonoscopy  4. Mammogram  5. Xray R hand         Care Plan/Goals: Reviewed    Goals    None       Shlonda was seen today for annual exam and hand pain.    Diagnoses and all orders for this visit:    Pain of finger of right hand  -     X-Ray Hand Complete Right; Future    Encounter for screening mammogram for malignant neoplasm of breast  -     Mammo Digital Screening Bilat w/ Carlos; Future    Screen for colon cancer  -     Ambulatory referral/consult to Endo Procedure ; Future    Insomnia, unspecified type  -     zolpidem (AMBIEN) 10 mg Tab; Take 1 tablet (10 mg total) by mouth nightly as needed (insomnia).    Anxiety  -     LORazepam (ATIVAN) 0.5 MG tablet; Take 1 tablet (0.5 mg total) by mouth nightly as needed for Anxiety.       Follow up: Follow up for with  for 6 month follow up.    After visit summary was printed and given to patient upon discharge today.  Patient goals and care plan are included in After Visit Summary.

## 2024-08-14 ENCOUNTER — HOSPITAL ENCOUNTER (OUTPATIENT)
Dept: RADIOLOGY | Facility: HOSPITAL | Age: 54
Discharge: HOME | End: 2024-08-14
Attending: NURSE PRACTITIONER
Payer: COMMERCIAL

## 2024-08-14 DIAGNOSIS — Z12.31 ENCOUNTER FOR SCREENING MAMMOGRAM FOR MALIGNANT NEOPLASM OF BREAST: ICD-10-CM

## 2024-08-14 PROCEDURE — 77063 BREAST TOMOSYNTHESIS BI: CPT | Mod: TC,PO

## 2024-08-14 PROCEDURE — 77067 SCR MAMMO BI INCL CAD: CPT | Mod: 26,,, | Performed by: RADIOLOGY

## 2024-08-14 PROCEDURE — 77063 BREAST TOMOSYNTHESIS BI: CPT | Mod: 26,,, | Performed by: RADIOLOGY

## 2024-08-15 ENCOUNTER — HOSPITAL ENCOUNTER (OUTPATIENT)
Dept: PREADMISSION TESTING | Facility: HOSPITAL | Age: 54
Discharge: HOME | End: 2024-08-15
Attending: COLON & RECTAL SURGERY
Payer: COMMERCIAL

## 2024-08-15 DIAGNOSIS — Z12.11 SCREEN FOR COLON CANCER: Primary | ICD-10-CM

## 2024-08-15 RX ORDER — POLYETHYLENE GLYCOL 3350, SODIUM SULFATE, POTASSIUM CHLORIDE, MAGNESIUM SULFATE, AND SODIUM CHLORIDE FOR ORAL SOLUTION 178.7-7.3G
2 KIT ORAL SEE ADMIN INSTRUCTIONS
Qty: 1 EACH | Refills: 0 | Status: SHIPPED | OUTPATIENT
Start: 2024-08-15

## 2024-08-26 ENCOUNTER — ANESTHESIA EVENT (OUTPATIENT)
Dept: ENDOSCOPY | Facility: HOSPITAL | Age: 54
End: 2024-08-26
Payer: COMMERCIAL

## 2024-08-26 NOTE — ANESTHESIA PREPROCEDURE EVALUATION
2024  Altagracia Islas is a 54 y.o., female.  Past Medical History:   Diagnosis Date    Heart attack     Hx of heart artery stent     Migraine     Skin abnormalities      Past Surgical History:   Procedure Laterality Date    BREAST SURGERY       SECTION      HYSTERECTOMY      paritial    TOTAL REDUCTION MAMMOPLASTY       Current Facility-Administered Medications:     onabotulinumtoxina injection 200 Units, 200 Units, Intramuscular, 1 time in Clinic/HOD, Kale Jean-Baptiste MD    Current Outpatient Medications:     aspirin (ECOTRIN) 81 MG EC tablet, Take 81 mg by mouth., Disp: , Rfl:     cyclobenzaprine (FLEXERIL) 10 MG tablet, Take 1 tablet (10 mg total) by mouth 3 (three) times daily., Disp: 10 tablet, Rfl: 0    gabapentin (NEURONTIN) 100 MG capsule, Take 1 capsule (100 mg total) by mouth 2 (two) times daily., Disp: 60 capsule, Rfl: 11    ketoconazole (NIZORAL) 2 % cream, APPLY ON THE SKIN BID PRN.  USE ON THE FACE, Disp: , Rfl:     LORazepam (ATIVAN) 0.5 MG tablet, Take 1 tablet (0.5 mg total) by mouth nightly as needed for Anxiety., Disp: 20 tablet, Rfl: 0    peg 3350-sod sulf,chlr-pot-mag (SUFLAVE) 178.7-7.3-0.5 gram SolR, Take 2 Bottles by mouth As instructed (Use as directed in facility directions)., Disp: 1 each, Rfl: 0    propranolol (INDERAL) 20 MG tablet, propranolol, Disp: , Rfl:     triamcinolone acetonide 0.1% (KENALOG) 0.1 % ointment, , Disp: , Rfl:     ubrogepant (UBRELVY) 100 mg tablet, Take 1 tablet (100 mg total) by mouth as needed for Migraine. If symptoms persist or return, may repeat dose after 2 hours. Maximum: 200 mg per 24 hours, Disp: 9 tablet, Rfl: 12    zolpidem (AMBIEN) 10 mg Tab, Take 1 tablet (10 mg total) by mouth nightly as needed (insomnia)., Disp: 30 tablet, Rfl: 3    benzonatate (TESSALON PERLES) 100 MG capsule, Take 2 capsules (200 mg total) by mouth 3 (three)  times daily as needed for Cough., Disp: 30 capsule, Rfl: 0    dexbrompheniramine-phenyleph (ALAHIST PE) 2-7.5 mg Tab, Take 1 tablet by mouth every 6 (six) hours as needed (sinus congestion)., Disp: 30 tablet, Rfl: 2    flurbiprofen (ANSAID) 100 MG tablet, Take 1 tablet (100 mg total) by mouth 3 (three) times daily as needed (migraine). (Patient not taking: Reported on 8/13/2024), Disp: 9 tablet, Rfl: 12    fluticasone (FLONASE) 50 mcg/actuation nasal spray, , Disp: , Rfl:     loratadine (CLARITIN) 10 mg tablet, Take 1 tablet (10 mg total) by mouth once daily., Disp: 30 tablet, Rfl: 0    methylPREDNISolone (MEDROL DOSEPACK) 4 mg tablet, TAKE AS DIRECTED, Disp: 1 each, Rfl: 0    oxyCODONE-acetaminophen (PERCOCET) 7.5-325 mg per tablet, Take 1 tablet by mouth every 6 (six) hours as needed for Pain., Disp: 10 tablet, Rfl: 0         Pre-op Assessment    I have reviewed the Patient Summary Reports.     I have reviewed the Nursing Notes. I have reviewed the NPO Status.   I have reviewed the Medications.     Review of Systems  Anesthesia Hx:  No problems with previous Anesthesia   History of prior surgery of interest to airway management or planning:          Denies Family Hx of Anesthesia complications.    Denies Personal Hx of Anesthesia complications.                    Social:  Non-Smoker, Social Alcohol Use       Cardiovascular:       Past MI                                         Neurological:    Neuromuscular Disease,  Headaches                                 Endocrine:        Metabolic Disorders, Obesity / BMI > 30      Physical Exam  General: Well nourished    Airway:  Mallampati: II   Mouth Opening: Normal  TM Distance: Normal  Tongue: Normal  Neck ROM: Normal ROM    Dental:  Intact        Anesthesia Plan  Type of Anesthesia, risks & benefits discussed:    Anesthesia Type: Gen Natural Airway  Intra-op Monitoring Plan: Standard ASA Monitors  Post Op Pain Control Plan: multimodal analgesia  Induction:   IV  Informed Consent: Informed consent signed with the Patient and all parties understand the risks and agree with anesthesia plan.  All questions answered. Patient consented to blood products? No  ASA Score: 2  Day of Surgery Review of History & Physical: H&P Update referred to the surgeon/provider.    Ready For Surgery From Anesthesia Perspective.     .

## 2024-08-29 ENCOUNTER — HOSPITAL ENCOUNTER (OUTPATIENT)
Facility: HOSPITAL | Age: 54
Discharge: HOME | End: 2024-08-29
Attending: INTERNAL MEDICINE | Admitting: INTERNAL MEDICINE
Payer: COMMERCIAL

## 2024-08-29 ENCOUNTER — ANESTHESIA (OUTPATIENT)
Dept: ENDOSCOPY | Facility: HOSPITAL | Age: 54
End: 2024-08-29
Payer: COMMERCIAL

## 2024-08-29 DIAGNOSIS — Z12.11 COLON CANCER SCREENING: ICD-10-CM

## 2024-08-29 PROCEDURE — G0121 COLON CA SCRN NOT HI RSK IND: HCPCS | Mod: ,,, | Performed by: INTERNAL MEDICINE

## 2024-08-29 PROCEDURE — 25000003 PHARM REV CODE 250: Performed by: NURSE ANESTHETIST, CERTIFIED REGISTERED

## 2024-08-29 PROCEDURE — G0121 COLON CA SCRN NOT HI RSK IND: HCPCS | Performed by: INTERNAL MEDICINE

## 2024-08-29 PROCEDURE — 37000008 HC ANESTHESIA 1ST 15 MINUTES: Performed by: INTERNAL MEDICINE

## 2024-08-29 PROCEDURE — 25000003 PHARM REV CODE 250: Performed by: INTERNAL MEDICINE

## 2024-08-29 PROCEDURE — 37000009 HC ANESTHESIA EA ADD 15 MINS: Performed by: INTERNAL MEDICINE

## 2024-08-29 PROCEDURE — 63600175 PHARM REV CODE 636 W HCPCS: Performed by: NURSE ANESTHETIST, CERTIFIED REGISTERED

## 2024-08-29 RX ORDER — SODIUM CHLORIDE, SODIUM LACTATE, POTASSIUM CHLORIDE, CALCIUM CHLORIDE 600; 310; 30; 20 MG/100ML; MG/100ML; MG/100ML; MG/100ML
INJECTION, SOLUTION INTRAVENOUS CONTINUOUS
Status: DISCONTINUED | OUTPATIENT
Start: 2024-08-29 | End: 2024-08-29 | Stop reason: HOSPADM

## 2024-08-29 RX ORDER — LIDOCAINE HYDROCHLORIDE 20 MG/ML
INJECTION, SOLUTION EPIDURAL; INFILTRATION; INTRACAUDAL; PERINEURAL
Status: DISCONTINUED | OUTPATIENT
Start: 2024-08-29 | End: 2024-08-29

## 2024-08-29 RX ORDER — DEXTROMETHORPHAN/PSEUDOEPHED 2.5-7.5/.8
DROPS ORAL
Status: DISCONTINUED | OUTPATIENT
Start: 2024-08-29 | End: 2024-08-29 | Stop reason: HOSPADM

## 2024-08-29 RX ORDER — PROPOFOL 10 MG/ML
VIAL (ML) INTRAVENOUS
Status: DISCONTINUED | OUTPATIENT
Start: 2024-08-29 | End: 2024-08-29

## 2024-08-29 RX ADMIN — PROPOFOL 100 MG: 10 INJECTION, EMULSION INTRAVENOUS at 09:08

## 2024-08-29 RX ADMIN — PROPOFOL 20 MG: 10 INJECTION, EMULSION INTRAVENOUS at 09:08

## 2024-08-29 RX ADMIN — SODIUM CHLORIDE, POTASSIUM CHLORIDE, SODIUM LACTATE AND CALCIUM CHLORIDE: 600; 310; 30; 20 INJECTION, SOLUTION INTRAVENOUS at 09:08

## 2024-08-29 RX ADMIN — PROPOFOL 50 MG: 10 INJECTION, EMULSION INTRAVENOUS at 09:08

## 2024-08-29 RX ADMIN — PROPOFOL 10 MG: 10 INJECTION, EMULSION INTRAVENOUS at 09:08

## 2024-08-29 RX ADMIN — LIDOCAINE HYDROCHLORIDE 50 MG: 20 INJECTION, SOLUTION EPIDURAL; INFILTRATION; INTRACAUDAL; PERINEURAL at 09:08

## 2024-08-29 NOTE — H&P
PRE PROCEDURE H&P    Patient Name: Altagracia Islas  MRN: [id number]  : 1970  Date of Procedure:  2024  Referring Physician: Amberly Harris,*  Primary Physician: Vita Gardner MD  Procedure Physician: Mona Santiago MD       Planned Procedure: Colonoscopy  Diagnosis: screening for colon cancer  Chief Complaint: Same as above    HPI: Patient is an 54 y.o. female is here for the above.     Last colonoscopy: Index colonoscopy   Family history: None   Anticoagulation: None    Past Medical History:   Past Medical History:   Diagnosis Date    Heart attack     Hx of heart artery stent     Migraine     Skin abnormalities         Past Surgical History:  Past Surgical History:   Procedure Laterality Date    BREAST SURGERY       SECTION      HYSTERECTOMY      paritial    TOTAL REDUCTION MAMMOPLASTY          Home Medications:  Prior to Admission medications    Medication Sig Start Date End Date Taking? Authorizing Provider   aspirin (ECOTRIN) 81 MG EC tablet Take 81 mg by mouth.   Yes Provider, Historical   cyclobenzaprine (FLEXERIL) 10 MG tablet Take 1 tablet (10 mg total) by mouth 3 (three) times daily. 24  Yes Kale Jean-Baptiste MD   gabapentin (NEURONTIN) 100 MG capsule Take 1 capsule (100 mg total) by mouth 2 (two) times daily. 23 Yes Santos Tilley MD   ketoconazole (NIZORAL) 2 % cream APPLY ON THE SKIN BID PRN.  USE ON THE FACE 17  Yes Provider, Historical   LORazepam (ATIVAN) 0.5 MG tablet Take 1 tablet (0.5 mg total) by mouth nightly as needed for Anxiety. 24 Yes Amberly Harris, NP   peg 3350-sod sulf,chlr-pot-mag (SUFLAVE) 178.7-7.3-0.5 gram SolR Take 2 Bottles by mouth As instructed (Use as directed in facility directions). 8/15/24  Yes Romario Lantigua PA-C   propranolol (INDERAL) 20 MG tablet propranolol   Yes Provider, Historical   triamcinolone acetonide 0.1% (KENALOG) 0.1 % ointment  17  Yes Provider, Historical   ubrogepant (UBRELVY)  100 mg tablet Take 1 tablet (100 mg total) by mouth as needed for Migraine. If symptoms persist or return, may repeat dose after 2 hours. Maximum: 200 mg per 24 hours 3/28/24  Yes Santos Tilley MD   zolpidem (AMBIEN) 10 mg Tab Take 1 tablet (10 mg total) by mouth nightly as needed (insomnia). 8/13/24 2/11/25 Yes Amberly Harris NP   benzonatate (TESSALON PERLES) 100 MG capsule Take 2 capsules (200 mg total) by mouth 3 (three) times daily as needed for Cough. 7/27/22   Alisa Rodarte, SELENA   dexbrompheniramine-phenyleph (ALAHIST PE) 2-7.5 mg Tab Take 1 tablet by mouth every 6 (six) hours as needed (sinus congestion). 7/13/23   Amberly Harris NP   flurbiprofen (ANSAID) 100 MG tablet Take 1 tablet (100 mg total) by mouth 3 (three) times daily as needed (migraine).  Patient not taking: Reported on 8/13/2024 4/23/24   Santos Tilley MD   fluticasone (FLONASE) 50 mcg/actuation nasal spray     Provider, Historical   loratadine (CLARITIN) 10 mg tablet Take 1 tablet (10 mg total) by mouth once daily. 12/13/19 7/9/24  Penny Abernathy NP   methylPREDNISolone (MEDROL DOSEPACK) 4 mg tablet TAKE AS DIRECTED 7/13/23   Amberly Harris NP   oxyCODONE-acetaminophen (PERCOCET) 7.5-325 mg per tablet Take 1 tablet by mouth every 6 (six) hours as needed for Pain. 7/13/23   Amberly Harris NP        Allergies:  Review of patient's allergies indicates:   Allergen Reactions    Latex      Other reaction(s): Rash, facial        Social History:   Social History     Socioeconomic History    Marital status:    Tobacco Use    Smoking status: Never     Passive exposure: Never    Smokeless tobacco: Never   Substance and Sexual Activity    Alcohol use: No    Drug use: No       Family History:  Family History   Problem Relation Name Age of Onset    Breast cancer Maternal Grandmother      Breast cancer Other mat great aunt        ROS: No acute cardiac events, no acute respiratory complaints.    "  Physical Exam (all patients):    /62 (BP Location: Right arm, Patient Position: Sitting)   Pulse 66   Temp 97.4 °F (36.3 °C) (Temporal)   Resp 17   Ht 5' 2" (1.575 m)   Wt 74.7 kg (164 lb 10.9 oz)   SpO2 100%   Breastfeeding No   BMI 30.12 kg/m²   Lungs: Clear to auscultation bilaterally, respirations unlabored  Heart: Regular rate and rhythm, S1 and S2 normal, no obvious murmurs  Abdomen:         Soft, non-tender, bowel sounds normal, no masses, no organomegaly    Lab Results   Component Value Date    WBC 7.87 04/23/2024    MCV 93 04/23/2024    RDW 13.4 04/23/2024     04/23/2024    GLU 73 08/09/2022    HGBA1C 5.3 08/09/2022    BUN 7 08/09/2022     08/09/2022    K 3.7 08/09/2022     08/09/2022        SEDATION PLAN: per anesthesia      History reviewed, vital signs satisfactory, cardiopulmonary status satisfactory, sedation options, risks and plans have been discussed with the patient  All their questions were answered and the patient agrees to the sedation procedures as planned and the patient is deemed an appropriate candidate for the sedation as planned.    Procedure explained to patient, informed consent obtained and placed in chart.    Mona Santiago  8/29/2024  8:57 AM     "

## 2024-08-29 NOTE — TRANSFER OF CARE
"Anesthesia Transfer of Care Note    Patient: Altagracia Islas    Procedure(s) Performed: Procedure(s) (LRB):  COLONOSCOPY (N/A)    Patient location: PACU    Anesthesia Type: general    Transport from OR: Transported from OR on room air with adequate spontaneous ventilation    Post pain: adequate analgesia    Post assessment: no apparent anesthetic complications and tolerated procedure well    Post vital signs: stable    Level of consciousness: awake    Nausea/Vomiting: no nausea/vomiting    Complications: none    Transfer of care protocol was followed      Last vitals: Visit Vitals  /62 (BP Location: Right arm, Patient Position: Sitting)   Pulse 66   Temp 36.3 °C (97.4 °F) (Temporal)   Resp 17   Ht 5' 2" (1.575 m)   Wt 74.7 kg (164 lb 10.9 oz)   SpO2 100%   Breastfeeding No   BMI 30.12 kg/m²     "

## 2024-08-29 NOTE — ANESTHESIA POSTPROCEDURE EVALUATION
Anesthesia Post Evaluation    Patient: Altagracia Islas    Procedure(s) Performed: Procedure(s) (LRB):  COLONOSCOPY (N/A)    Final Anesthesia Type: general      Patient location during evaluation: PACU  Patient participation: Yes- Able to Participate  Level of consciousness: awake  Post-procedure vital signs: reviewed and stable  Pain management: adequate  Airway patency: patent    PONV status at discharge: No PONV  Anesthetic complications: no      Cardiovascular status: stable  Respiratory status: unassisted  Hydration status: euvolemic  Follow-up not needed.              Vitals Value Taken Time   /69 08/29/24 0927   Temp 36.4 °C (97.5 °F) 08/29/24 0922   Pulse 74 08/29/24 0929   Resp 21 08/29/24 0929   SpO2 100 % 08/29/24 0929   Vitals shown include unfiled device data.      No case tracking events are documented in the log.      Pain/Vangie Score: No data recorded

## 2024-08-29 NOTE — [employer] PATIENT INSTRUCTIONS
Discharge Summary/Instructions after an Endoscopic Procedure  Patient Name: Altagracia Islas  Patient MRN: [id number]  Patient YOB: 1970 Thursday, August 29, 2024  Mona Santiago MD  Dear patient,  As a result of recent federal legislation (The Federal Cures Act), you may   receive lab or pathology results from your procedure in your [facility]   account before your physician is able to contact you. Your physician or   their representative will relay the results to you with their   recommendations at their soonest availability.  Thank you,  RESTRICTIONS:  During your procedure today, you received medications for sedation.  These   medications may affect your judgment, balance and coordination.  Therefore,   for 24 hours, you have the following restrictions:   - DO NOT drive a car, operate machinery, make legal/financial decisions,   sign important papers or drink alcohol.    ACTIVITY:  Today: no heavy lifting, straining or running due to procedural   sedation/anesthesia.  The following day: return to full activity including work.  DIET:  Eat and drink normally unless instructed otherwise.     TREATMENT FOR COMMON SIDE EFFECTS:  - Mild abdominal pain, nausea, belching, bloating or excessive gas:  rest,   eat lightly and use a heating pad.  - Sore Throat: treat with throat lozenges and/or gargle with warm salt   water.  - Because air was used during the procedure, expelling large amounts of air   from your rectum or belching is normal.  - If a bowel prep was taken, you may not have a bowel movement for 1-3 days.    This is normal.  SYMPTOMS TO WATCH FOR AND REPORT TO YOUR PHYSICIAN:  1. Abdominal pain or bloating, other than gas cramps.  2. Chest pain.  3. Back pain.  4. Signs of infection such as: chills or fever occurring within 24 hours   after the procedure.  5. Rectal bleeding, which would show as bright red, maroon, or black stools.   (A tablespoon of blood from the rectum is not serious,  especially if   hemorrhoids are present.)  6. Vomiting.  7. Weakness or dizziness.  GO DIRECTLY TO THE NEAREST EMERGENCY ROOM IF YOU HAVE ANY OF THE FOLLOWING:      Difficulty breathing              Chills and/or fever over 101 F   Persistent vomiting and/or vomiting blood   Severe abdominal pain   Severe chest pain   Black, tarry stools   Bleeding- more than one tablespoon   Any other symptom or condition that you feel may need urgent attention  Your doctor recommends these additional instructions:  If any biopsies were taken, your doctors clinic will contact you in 1 to 2   weeks with any results.  - Discharge patient to home.   - Resume previous diet.   - Continue present medications.   - Repeat colonoscopy in 10 years for screening purposes.   - Return to referring physician.   - Patient has a contact number available for emergencies.  The signs and   symptoms of potential delayed complications were discussed with the   patient.  Return to normal activities tomorrow.  Written discharge   instructions were provided to the patient.  For questions, problems or results please call your physician Mona Santiago MD at Work:  [phone]  If you have any questions about the above instructions, call the GI   department at [phone] or call the endoscopy unit at [phone]   from 7am until 3 pm.  [facility] [place], EMERGENCY ROOM PHONE NUMBER:   [phone]  IF A COMPLICATION OR EMERGENCY SITUATION ARISES AND YOU ARE UNABLE TO REACH   YOUR PHYSICIAN - GO DIRECTLY TO THE EMERGENCY ROOM.  I have read or have had read to me these discharge instructions for my   procedure and have received a written copy.  I understand these   instructions and will follow-up with my physician if I have any questions.     __________________________________       _____________________________________  Nurse Signature                                          Patient/Designated   Responsible Party  Signature  Mona Santiago MD  8/29/2024 9:23:27 AM  This report has been verified and signed electronically.  Dear patient,  As a result of recent federal legislation (The Federal Cures Act), you may   receive lab or pathology results from your procedure in your [facility]   account before your physician is able to contact you. Your physician or   their representative will relay the results to you with their   recommendations at their soonest availability.  Thank you,  [employer]

## 2024-08-30 VITALS
DIASTOLIC BLOOD PRESSURE: 76 MMHG | TEMPERATURE: 98 F | BODY MASS INDEX: 30.31 KG/M2 | WEIGHT: 164.69 LBS | HEART RATE: 75 BPM | SYSTOLIC BLOOD PRESSURE: 126 MMHG | HEIGHT: 62 IN | OXYGEN SATURATION: 97 % | RESPIRATION RATE: 19 BRPM

## 2024-09-03 DIAGNOSIS — G43.911 INTRACTABLE MIGRAINE WITH STATUS MIGRAINOSUS, UNSPECIFIED MIGRAINE TYPE: ICD-10-CM

## 2024-09-03 RX ORDER — GABAPENTIN 100 MG/1
100 CAPSULE ORAL 2 TIMES DAILY
Qty: 60 CAPSULE | Refills: 11 | Status: SHIPPED | OUTPATIENT
Start: 2024-09-03 | End: 2025-09-03

## 2024-10-14 ENCOUNTER — TELEPHONE (OUTPATIENT)
Dept: PAIN MEDICINE | Facility: CLINIC | Age: 54
End: 2024-10-14
Payer: COMMERCIAL

## 2024-10-14 RX ORDER — DIAZEPAM 5 MG/1
5 TABLET ORAL ONCE
Qty: 1 TABLET | Refills: 0 | Status: SHIPPED | OUTPATIENT
Start: 2024-10-14 | End: 2024-10-18

## 2024-10-14 NOTE — TELEPHONE ENCOUNTER
Call patient to inform them that they can  their medication from the pharmacy. P.t  understand. All question answered.         Danis Acosta   Medical Assistant

## 2024-10-14 NOTE — PROGRESS NOTES
Established Patient Interventional Pain Note     Referring Physician: Kale Jean-Baptiste MD    PCP: Vita Gardner MD    Chief Complaint:   Chief Complaint   Patient presents with    Botulinum Toxin Injection        SUBJECTIVE:  Interval Hx: 10/15/2024  Patient presents for Botox procedure. She reports at least 90% improvement in migraine headaches with Botox administration.  Patient reports improvement in intensity and frequency of headaches exceeds 2-1/2 weeks' in duration.  Patient reports 2 weeks prior to her next treatment she can experience migraine headaches in the frontotemporal and occipital regions.  Her most severe pain is in the occipital territory.  Pain today is rated a 3/10.    PROCEDURE:  Informed consent was obtained and the procedure was described to the patient, a timeout was performed prior to starting the procedure.    Surgeon: Kale Jean-Baptiste MD      Pre-Op Diagnosis: chronic intractable migraine    Post-Op Diagnosis:  Same    Procedure:  Botox injections for headache    EBL: None    Complications: None    Specimens: None    Description of Procedure:    The patient was placed in the sitting position and the areas over the planned injections were all prepped with alcohol.  botox was reconstituted in preservative free normal saline, 100 units was placed in 2 mLof saline on a 30-gauge needle.  5 Units each was placed into the following muscles:       Botox mapping:        - muscles:  5 units bilaterally, 10 units total  -Procerus muscle: 5 units  -Frontalis muscle:  4 sites, 5 units each, 20 units total  -Temporalis muscle: 5 units , 4 sites bilaterally, 20 units total  -Occipitalis muscle: 5 units, 3 sites bilaterally, 30 units total  -Trapezius muscles:  5 units, 3 sites bilaterally, 30 units total  -cervical paraspinous:  5 units, 2 sites bilaterally, 20 units total    Total: 135 Units  Waste: 65 Units    Patient tolerated the procedure well and bleeding was nil no Band-Aids were  applied the patient was discharged in stable condition.        Interval history 07/09/2024  Patient presents for MRI brain with and without contrast review and for Botox procedure:  Patient reports significant improvement in frequency, intensity of migraine headaches with initiation of Botox treatment.  Patient reports 80% improvement.  She reports headaches may return approximately after 2-1/2 months.  Even with recurrence, these headaches tend to be less intense with shorter durations.  She does report pain along primarily frontotemporal and cervical paraspinous musculature when headaches are present.  Patient has not needed to use Fioricet medication during this period of time.    Of note patient followed with Dr. Tilley 04/23/2024 with the following evaluation:      Patient has been taking Ambien as needed by Dr. Tilley to help with sleep hygiene.  She has inquiring about a refill of this medication.    PROCEDURE:  Informed consent was obtained and the procedure was described to the patient, a timeout was performed prior to starting the procedure.    Surgeon: Kale Jean-Baptiste MD      Pre-Op Diagnosis: chronic intractable migraine    Post-Op Diagnosis:  Same    Procedure:  Botox injections for headache    EBL: None    Complications: None    Specimens: None    Description of Procedure:    The patient was placed in the sitting position and the areas over the planned injections were all prepped with alcohol.  botox was reconstituted in preservative free normal saline, 100 units was placed in 2 mLof saline on a 30-gauge needle.  5 Units each was placed into the following muscles:       Botox mapping:        - muscles:  5 units bilaterally, 10 units total  -Procerus muscle: 5 units  -Frontalis muscle:  4 sites, 5 units each, 20 units total  -Temporalis muscle: 5 units , 4 sites bilaterally, 20 units total  -Occipitalis muscle: 5 units, 3 sites bilaterally, 30 units total  -Trapezius muscles:  5 units, 3 sites  bilaterally, 30 units total  -cervical paraspinous:  5 units, 2 sites bilaterally, 20 units total    Total: 135 Units  Waste: 65 Units    Patient tolerated the procedure well and bleeding was nil no Band-Aids were applied the patient was discharged in stable condition.      Interval history 04/09/2024  Patient presents for Botox procedure:  Patient reports significant improvement in frequency, intensity of migraine headaches with initiation of Botox treatment.  Patient reports 80% improvement.  She reports headaches may return approximately after 2-1/2 months.  Even with recurrence, these headaches tend to be less intense with shorter durations.  She does report pain along primarily frontotemporal and cervical paraspinous musculature when headaches are present.  She reports stress has been increased as her mother-in-law has been sick in [place].  Patient continues to work 2 jobs during this period as well.  She does report difficulty scheduling follow with Dr. Mckeon, with whom she discussed headaches and insomnia.     PROCEDURE:  Informed consent was obtained and the procedure was described to the patient, a timeout was performed prior to starting the procedure.    Surgeon: Kale Jean-Baptiste MD      Pre-Op Diagnosis: chronic intractable migraine    Post-Op Diagnosis:  Same    Procedure:  Botox injections for headache    EBL: None    Complications: None    Specimens: None    Description of Procedure:    The patient was placed in the sitting position and the areas over the planned injections were all prepped with alcohol.  botox was reconstituted in preservative free normal saline, 100 units was placed in 2 mLof saline on a 30-gauge needle.  5 Units each was placed into the following muscles:       Botox mapping:        - muscles:  5 units bilaterally, 10 units total  -Procerus muscle: 5 units  -Frontalis muscle:  4 sites, 5 units each, 20 units total  -Temporalis muscle: 5 units , 4 sites bilaterally, 20 units  total  -Occipitalis muscle: 5 units, 3 sites bilaterally, 30 units total  -Trapezius muscles:  5 units, 3 sites bilaterally, 30 units total  -cervical paraspinous:  5 units, 2 sites bilaterally, 20 units total    Total: 135 Units    Patient tolerated the procedure well and bleeding was nil no Band-Aids were applied the patient was discharged in stable condition.          Interval history 08/21/2023   Patient presents for Botox treatment. Patient reports diagnosis of migraines exceeding 20 years in duration.  Patient reports her migraines occur at least 2-3 days per week and lasts in between 3-5 days in duration.  She reports greater than 15 days per month with migraine headaches.  Pain is typically in the frontotemporal, retro-orbital and periauricular territories.  Patient reports when present headaches are typically unilateral but can vary from right to left.  Pain today is rated 0/10 but at its worse is a 10/10.  Pain is described as dull and throbbing and pounding in nature.  Headaches are compounded by diplopia, nausea, photophobia, phonophobia and neck stiffness.  Headaches can be brought on by weather or stress.  Pain is marginally improved with rest as well as taking propranolol 3 times daily.  Patient has failed to have improvement with Imitrex and Topamax in the past    PROCEDURE:  Informed consent was obtained and the procedure was described to the patient, a timeout was performed prior to starting the procedure.    Surgeon: Kale Jean-Baptiste MD      Pre-Op Diagnosis: chronic intractable migraine, atypical cluster headache    Post-Op Diagnosis:  Same    Procedure:  Botox injections for headache    EBL: None    Complications: None    Specimens: None    Description of Procedure:    The patient was placed in the sitting position and the areas over the planned injections were all prepped with alcohol.  botox was reconstituted in preservative free normal saline, 100 units was placed in 2 mLof saline on a 30-gauge  needle.  5 Units each was placed into the following muscles:       Botox mapping:        - muscles:  5 units bilaterally, 10 units total  -Procerus muscle: 5 units  -Frontalis muscle:  4 sites, 5 units each, 20 units total  -Temporalis muscle: 5 units , 4 sites bilaterally, 20 units total  -Occipitalis muscle: 5 units, 3 sites bilaterally, 30 units total  -Trapezius muscles:  5 units, 3 sites bilaterally, 30 units total  -cervical paraspinous:  5 units, 2 sites bilaterally, 20 units total    Total: 135 Units  Waste: 65 Units    Patient tolerated the procedure well and bleeding was nil no Band-Aids were applied the patient was discharged in stable condition.        HPI 07/24/2023  Altagracia Islas is a 54 y.o. female with past medical history significant for migraine HA, CAD s/p MI s/p PTCA who presents to the clinic for the evaluation of headaches.  Patient reports diagnosis of migraines exceeding 20 years in duration.  Patient reports her migraines occur at least 2-3 days per week and lasts in between 3-5 days in duration.  She reports greater than 15 days per month with migraine headaches.  Pain is typically in the frontotemporal, retro-orbital and periauricular territories.  Patient reports when present headaches are typically unilateral but can vary from right to left.  Pain today is rated 0/10 but at its worse is a 10/10.  Pain is described as dull and throbbing and pounding in nature.  Headaches are compounded by diplopia, nausea, photophobia, phonophobia and neck stiffness.  Headaches can be brought on by weather or stress.  Pain is marginally improved with rest as well as taking propranolol 3 times daily.  Patient has failed to have improvement with Imitrex and Topamax in the past.  Of note patient reports complex prior history of having trifecta of dizziness, migraines and chest pain with echo and stress test unremarkable.  Patient had a heart catheterization demonstrating a blood clot in the  "right coronary artery." Since that time patient reports she has been advised to avoid migraine medications and birth control, which may have been contributing to her symptoms as well.  Today she denies more distal radiculopathy into the upper extremities or hands, compromise in hand  strength or dexterity.  Of note patient has not had a follow-up with Neurology since 2019 during her cardiac workup.  Patient has performed physician directed cervical traction and strengthening exercises over the last 8 weeks without meaningful improvement in her headaches or pain.    Patient denies night fever/night sweats, urinary incontinence, bowel incontinence, significant weight loss, significant motor weakness, and loss of sensations.    Pain Disability Index Review:         10/15/2024    12:09 PM 2024    12:32 PM 2024     1:41 PM   Last 3 PDI Scores   Pain Disability Index (PDI) 18 38 14       Non-Pharmacologic Treatments:  Physical Therapy/Home Exercise: yes  Ice/Heat:yes  TENS: no  Acupuncture: no  Massage: no  Chiropractic: no    Other: no      Pain Medications:  - Opioids: Percocet (Oxycodone/Acetaminophen)  - Adjuvant Medications: Cyclobenzaprine (Flexeril)  - Anti-Coagulants: Aspirin    Pain Procedures:   None    Past Medical History:   Diagnosis Date    Heart attack     Hx of heart artery stent     Migraine     Skin abnormalities      Past Surgical History:   Procedure Laterality Date    BREAST SURGERY       SECTION      COLONOSCOPY N/A 2024    Procedure: COLONOSCOPY;  Surgeon: Mona Santiago MD;  Location: [facility];  Service: Gastroenterology;  Laterality: N/A;    HYSTERECTOMY      paritial    TOTAL REDUCTION MAMMOPLASTY       Review of patient's allergies indicates:   Allergen Reactions    Latex      Other reaction(s): Rash, facial       Current Outpatient Medications   Medication Sig    aspirin (ECOTRIN) 81 MG EC tablet Take 81 mg by mouth.    benzonatate (TESSALON PERLES) 100 MG " capsule Take 2 capsules (200 mg total) by mouth 3 (three) times daily as needed for Cough.    cyclobenzaprine (FLEXERIL) 10 MG tablet Take 1 tablet (10 mg total) by mouth 3 (three) times daily.    dexbrompheniramine-phenyleph (ALAHIST PE) 2-7.5 mg Tab Take 1 tablet by mouth every 6 (six) hours as needed (sinus congestion).    flurbiprofen (ANSAID) 100 MG tablet Take 1 tablet (100 mg total) by mouth 3 (three) times daily as needed (migraine).    fluticasone (FLONASE) 50 mcg/actuation nasal spray     gabapentin (NEURONTIN) 100 MG capsule Take 1 capsule (100 mg total) by mouth 2 (two) times daily.    ketoconazole (NIZORAL) 2 % cream APPLY ON THE SKIN BID PRN.  USE ON THE FACE    methylPREDNISolone (MEDROL DOSEPACK) 4 mg tablet TAKE AS DIRECTED    oxyCODONE-acetaminophen (PERCOCET) 7.5-325 mg per tablet Take 1 tablet by mouth every 6 (six) hours as needed for Pain.    peg 3350-sod sulf,chlr-pot-mag (SUFLAVE) 178.7-7.3-0.5 gram SolR Take 2 Bottles by mouth As instructed (Use as directed in facility directions).    propranolol (INDERAL) 20 MG tablet propranolol    triamcinolone acetonide 0.1% (KENALOG) 0.1 % ointment     ubrogepant (UBRELVY) 100 mg tablet Take 1 tablet (100 mg total) by mouth as needed for Migraine. If symptoms persist or return, may repeat dose after 2 hours. Maximum: 200 mg per 24 hours    zolpidem (AMBIEN) 10 mg Tab Take 1 tablet (10 mg total) by mouth nightly as needed (insomnia).    diazePAM (VALIUM) 5 MG tablet Take 1 tablet (5 mg total) by mouth once. 45 minutes to 1 hour prior to Botox treatment for 1 dose    loratadine (CLARITIN) 10 mg tablet Take 1 tablet (10 mg total) by mouth once daily.    LORazepam (ATIVAN) 0.5 MG tablet Take 1 tablet (0.5 mg total) by mouth nightly as needed for Anxiety.     Current Facility-Administered Medications   Medication    onabotulinumtoxina injection 200 Units       Review of Systems     GENERAL:  No weight loss, malaise or fevers.  HEENT:   No recent changes in  "vision or hearing  NECK:  Negative for lumps, no difficulty with swallowing.  RESPIRATORY:  Negative for cough, wheezing or shortness of breath, patient denies any recent URI.  CARDIOVASCULAR:  Negative for chest pain or palpitations.  GI:  Negative for abdominal discomfort, blood in stools or black stools or change in bowel habits.  MUSCULOSKELETAL:  See HPI.  SKIN:  Negative for lesions, rash, and itching.  PSYCH:  No mood disorder or recent psychosocial stressors.   HEMATOLOGY/LYMPHOLOGY:  Negative for prolonged bleeding, bruising easily or swollen nodes.    NEURO:   No history of syncope, paralysis, seizures or tremors.  All other reviewed and negative other than HPI.    OBJECTIVE:    BP (!) 144/79   Pulse 62   Resp 17   Ht 5' 2" (1.575 m)   Wt 77.6 kg (171 lb 1.2 oz)   BMI 31.29 kg/m²       Physical Exam    GENERAL: Well appearing, in no acute distress, alert and oriented x3.  PSYCH:  Mood and affect appropriate.  SKIN: Skin color, texture, turgor normal, no rashes or lesions.  HEAD/FACE:  Normocephalic, atraumatic. Cranial nerves grossly intact.    NECK: No pain to palpation over the cervical paraspinous muscles. Spurling Negative. No pain with neck flexion, extension, or lateral flexion.   Normaltesting biceps, triceps and brachioradialis bilaterally.    NegativeHoffmann's bilaterally.    5/5 strength testing deltoid, biceps, triceps, wrist extensor, wrist flexor and ulnar intrinsics bilaterally.    Normal  strength bilaterally    CV: RRR with palpation of the radial artery.  PULM: No evidence of respiratory difficulty, symmetric chest rise.  GI:  Soft and non-tender.    NEURO: Bilateral upper and lower extremity coordination and muscle stretch reflexes are physiologic and symmetric. No loss of sensation is noted.  GAIT: normal.    Imaging:   MRI brain with and without contrast 05/01/2024  FINDINGS:  Cerebral and ventricular volumes are within normal limits for the patient's age.  No evidence of " hydrocephalus.     No evidence of acute territorial infarct, intracranial hemorrhage, or mass lesion.  No abnormal enhancement, restricted diffusion, or susceptibility artifact.  No significant midline shift or mass effect.     Midline structures and posterior fossa structures are unremarkable.  Basal cisterns are clear.  Major vascular flow voids are unremarkable.     Cranium is unremarkable.  Orbits and globes are within normal limits.  Paranasal sinuses and mastoid air cells are clear.     Impression:     Unremarkable MR appearance of the brain.  No acute findings or abnormal enhancement.    ASSESSMENT: 54 y.o. year old female with head pain, consistent with     1. Intractable migraine with status migrainosus, unspecified migraine type  onabotulinumtoxina injection 100 Units    Prior authorization Order    onabotulinumtoxina injection 100 Units      2. Myofascial pain              PLAN:   - Interventions: Schedule repeat botox in 3 months: 01/21/2025.    Botox Treatments:  -10/16/2024  -08/21/2023  -04/09/2024  -07/09/2024    Patient is a candidate for Botox treatment secondary to greater than 15 migraine headaches per month, lasting 3-5 days in duration with failed response to agents including nonsteroidal anti-inflammatory medications, Imitrex, Topamax and propranolol.  Patient notes 80% improvement in intensity, frequency of headaches with initiation of Botox treatments.    Pre-Op Diagnosis: chronic intractable migraine    Post-Op Diagnosis:  Same     Anticipated Botox mapping:        - muscles:  5 units bilaterally, 10 units total  -Procerus muscle: 5 units  -Frontalis muscle:  4 sites, 5 units each, 20 units total  -Temporalis muscle: 5 units , 4 sites bilaterally, 20 units total  -Occipitalis muscle: 5 units, 3 sites bilaterally, 30 units total  -Trapezius muscles:  5 units, 3 sites bilaterally, 30 units total  -cervical paraspinous:  5 units, 2 sites bilaterally, 20 units total    Total: 135  Units  Waste: 65 Units    - Anticoagulation use: Yes aspirin     report:  Reviewed and consistent with medication use as prescribed.    - Medications:  -Continue propranolol, Ubrelvy per Ms. Kimberly NP  -Continue Ambien per Dr. Carbajal    -We have discussed temporary prescription for Fioricet (butalbital acetaminophen caffeine 50, 325, 40 mg) to help with migraine headaches.  Patient can take this medication every 6-8 hours as needed for severe headache.     - Therapy:   -We discussed continuing physician directed at home physical therapy to help manage the patient/s painful condition. The patient was counseled that muscle strengthening will improve the long term prognosis in regards to pain and may also help increase range of motion and mobility. .    -Imaging:  Diagnostic imaging (MRI brain with and without contrast) reviewed and discussed with the patient    - Consults/Referrals: (PRN)  -Neurology: migraine [facility]; Dr. Santos Tilley    - Follow up visit:       The above plan and management options were discussed at length with patient. Patient is in agreement with the above and verbalized understanding.    - I discussed the goals of interventional chronic pain management with the patient on today's visit. We discussed a multimodal and systematic approach to pain.  This includes diagnostic and therapeutic injections, adjuvant pharmacologic treatment, physical therapy, and at times psychiatry.  I emphasized the importance of regular exercise, core strengthening and stretching, diet and weight loss as a cornerstone of long-term pain management.    - This condition does not require this patient to take time off of work, and the primary goal of our Pain Management services is to improve the patient's functional capacity.  - Patient Questions: Answered all of the patient's questions regarding diagnoses, therapy, treatment and next steps        Kale Jean-Baptiste MD  Interventional Pain Management  [facility]  [place]    Disclaimer:  This note was prepared using voice recognition system and is likely to have sound alike errors that may have been overlooked even after proof reading.  Please call me with any questions

## 2024-10-14 NOTE — TELEPHONE ENCOUNTER
----- Message from Annelise sent at 10/14/2024  9:00 AM CDT -----  Regarding: Medication  Contact: self [phone]   Mayuri.  Patient would like to speak with someone concerning some Medication she can take, please callback thanks..SW

## 2024-10-15 ENCOUNTER — OFFICE VISIT (OUTPATIENT)
Dept: PAIN MEDICINE | Facility: CLINIC | Age: 54
End: 2024-10-15
Payer: COMMERCIAL

## 2024-10-15 VITALS
WEIGHT: 171.06 LBS | BODY MASS INDEX: 31.48 KG/M2 | HEIGHT: 62 IN | SYSTOLIC BLOOD PRESSURE: 144 MMHG | DIASTOLIC BLOOD PRESSURE: 79 MMHG | HEART RATE: 62 BPM | RESPIRATION RATE: 17 BRPM

## 2024-10-15 DIAGNOSIS — M79.18 MYOFASCIAL PAIN: ICD-10-CM

## 2024-10-15 DIAGNOSIS — G43.911 INTRACTABLE MIGRAINE WITH STATUS MIGRAINOSUS, UNSPECIFIED MIGRAINE TYPE: Primary | ICD-10-CM

## 2024-10-15 PROCEDURE — 99999 PR PBB SHADOW E&M-EST. PATIENT-LVL V: CPT | Mod: PBBFAC,,, | Performed by: ANESTHESIOLOGY

## 2025-01-16 ENCOUNTER — TELEPHONE (OUTPATIENT)
Dept: PAIN MEDICINE | Facility: CLINIC | Age: 55
End: 2025-01-16
Payer: COMMERCIAL

## 2025-01-16 DIAGNOSIS — G43.911 INTRACTABLE MIGRAINE WITH STATUS MIGRAINOSUS, UNSPECIFIED MIGRAINE TYPE: Primary | ICD-10-CM

## 2025-01-16 NOTE — TELEPHONE ENCOUNTER
I told the patient if I move her appointment it will push her back to March. I told her we cn just play it by ear for right now and patient was fine with that. I said we will call if the weather is too bad. Patient does understands. Patient also stated that Dr. Jean-Baptiste would call valium in to relax her before she get her botox.    Stormy MARCUM ([facility])

## 2025-01-16 NOTE — TELEPHONE ENCOUNTER
----- Message from Rob sent at 1/16/2025  3:32 PM CST -----  Contact: [phone]  Type:  Sooner Apoointment Request    Caller is requesting a sooner appointment.  Caller declined first available appointment listed below.  Caller will not accept being placed on the waitlist and is requesting a message be sent to doctor.  Name of Caller:JUDY GARVEY [[id number]]  When is the first available appointment?RESCHEDULE AS SOON AS POSSIBLE   Symptoms: Botox (approved)  Would the patient rather a call back or a response via [facility]? Call back  Best Call Back Number: [phone]  Additional Information: mrn [id number]... bad weather on 01/21/2025

## 2025-01-17 ENCOUNTER — TELEPHONE (OUTPATIENT)
Dept: PAIN MEDICINE | Facility: CLINIC | Age: 55
End: 2025-01-17
Payer: COMMERCIAL

## 2025-01-17 RX ORDER — DIAZEPAM 5 MG/1
5 TABLET ORAL ONCE
Qty: 1 TABLET | Refills: 0 | Status: SHIPPED | OUTPATIENT
Start: 2025-01-17 | End: 2025-01-17

## 2025-02-05 ENCOUNTER — HOSPITAL ENCOUNTER (OUTPATIENT)
Dept: RADIOLOGY | Facility: CLINIC | Age: 55
Discharge: HOME | End: 2025-02-05
Attending: PHYSICIAN ASSISTANT
Payer: COMMERCIAL

## 2025-02-05 ENCOUNTER — OFFICE VISIT (OUTPATIENT)
Dept: URGENT CARE | Facility: CLINIC | Age: 55
End: 2025-02-05
Payer: COMMERCIAL

## 2025-02-05 VITALS
HEIGHT: 62 IN | SYSTOLIC BLOOD PRESSURE: 141 MMHG | WEIGHT: 166.25 LBS | DIASTOLIC BLOOD PRESSURE: 63 MMHG | HEART RATE: 55 BPM | OXYGEN SATURATION: 100 % | BODY MASS INDEX: 30.59 KG/M2 | TEMPERATURE: 98 F | RESPIRATION RATE: 18 BRPM

## 2025-02-05 DIAGNOSIS — R31.29 OTHER MICROSCOPIC HEMATURIA: ICD-10-CM

## 2025-02-05 DIAGNOSIS — M54.6 ACUTE LEFT-SIDED THORACIC BACK PAIN: ICD-10-CM

## 2025-02-05 DIAGNOSIS — R10.9 ABDOMINAL PAIN, UNSPECIFIED ABDOMINAL LOCATION: ICD-10-CM

## 2025-02-05 DIAGNOSIS — R10.9 ACUTE LEFT FLANK PAIN: ICD-10-CM

## 2025-02-05 DIAGNOSIS — N20.0 LEFT NEPHROLITHIASIS: Primary | ICD-10-CM

## 2025-02-05 LAB
BILIRUBIN, UA POC OHS: NEGATIVE
BLOOD, UA POC OHS: ABNORMAL
CLARITY, UA POC OHS: ABNORMAL
COLOR, UA POC OHS: YELLOW
GLUCOSE, UA POC OHS: NEGATIVE
KETONES, UA POC OHS: ABNORMAL
LEUKOCYTES, UA POC OHS: NEGATIVE
NITRITE, UA POC OHS: NEGATIVE
PH, UA POC OHS: 7
PROTEIN, UA POC OHS: ABNORMAL
SPECIFIC GRAVITY, UA POC OHS: 1.02
UROBILINOGEN, UA POC OHS: 1

## 2025-02-05 PROCEDURE — 99214 OFFICE O/P EST MOD 30 MIN: CPT | Mod: S$GLB,,, | Performed by: PHYSICIAN ASSISTANT

## 2025-02-05 PROCEDURE — 74018 RADEX ABDOMEN 1 VIEW: CPT | Mod: S$GLB,,, | Performed by: STUDENT IN AN ORGANIZED HEALTH CARE EDUCATION/TRAINING PROGRAM

## 2025-02-05 PROCEDURE — 87086 URINE CULTURE/COLONY COUNT: CPT | Performed by: PHYSICIAN ASSISTANT

## 2025-02-05 PROCEDURE — 81003 URINALYSIS AUTO W/O SCOPE: CPT | Mod: QW,S$GLB,, | Performed by: PHYSICIAN ASSISTANT

## 2025-02-05 RX ORDER — TAMSULOSIN HYDROCHLORIDE 0.4 MG/1
0.4 CAPSULE ORAL DAILY
Qty: 30 CAPSULE | Refills: 0 | Status: SHIPPED | OUTPATIENT
Start: 2025-02-05 | End: 2025-03-07

## 2025-02-05 RX ORDER — HYDROCODONE BITARTRATE AND ACETAMINOPHEN 5; 325 MG/1; MG/1
1 TABLET ORAL EVERY 6 HOURS PRN
Qty: 20 TABLET | Refills: 0 | Status: SHIPPED | OUTPATIENT
Start: 2025-02-05 | End: 2025-02-10

## 2025-02-05 NOTE — PROGRESS NOTES
Established Patient Interventional Pain Note     Referring Physician: Kale Jean-Baptiste MD    PCP: Vita Gardner MD    Chief Complaint:   No chief complaint on file.       SUBJECTIVE:  Interval history 02/06/2025    Of note patient presented for left-sided nephrolithiasis to urgent Care 02/05/2025 was given, Norco and Flomax following CT renal stone study.     Patient presents for Botox procedure. She reports at least 90% improvement in migraine headaches with Botox administration.  She reports having a migraine this morning.  Since our last clinic visit she reports pain has been particularly severe in the occipitalis and cervical paraspinous musculature.   Pain today is rated a 6/10.  She is requesting a temporary refill for Ubrelvy until she sees Dr. Carbajal.    PROCEDURE:  Informed consent was obtained and the procedure was described to the patient, a timeout was performed prior to starting the procedure.    Surgeon: Kale Jean-Baptiste MD      Pre-Op Diagnosis: chronic intractable migraine    Post-Op Diagnosis:  Same    Procedure:  Botox injections for headache    EBL: None    Complications: None    Specimens: None    Description of Procedure:    The patient was placed in the sitting position and the areas over the planned injections were all prepped with alcohol.  botox was reconstituted in preservative free normal saline, 100 units was placed in 2 mLof saline on a 30-gauge needle.  5 Units each was placed into the following muscles:       Botox mapping:        - muscles:  5 units bilaterally, 10 units total  -Procerus muscle: 5 units  -Frontalis muscle:  4 sites, 5 units each, 20 units total  -Temporalis muscle: 5 units , 4 sites bilaterally, 20 units total  -Occipitalis muscle: 5 units, 3 sites bilaterally, 30 units total  -Trapezius muscles:  5 units, 3 sites bilaterally, 30 units total  -cervical paraspinous:  5 units, 2 sites bilaterally, 20 units total    Total: 135 Units  Waste: 65 Units    Patient  tolerated the procedure well and bleeding was nil no Band-Aids were applied the patient was discharged in stable condition.        Interval Hx: 10/15/2024  Patient presents for Botox procedure. She reports at least 90% improvement in migraine headaches with Botox administration.  Patient reports improvement in intensity and frequency of headaches exceeds 2-1/2 weeks' in duration.  Patient reports 2 weeks prior to her next treatment she can experience migraine headaches in the frontotemporal and occipital regions.  Her most severe pain is in the occipital territory.  Pain today is rated a 3/10.    PROCEDURE:  Informed consent was obtained and the procedure was described to the patient, a timeout was performed prior to starting the procedure.    Surgeon: Kale Jean-Baptiste MD      Pre-Op Diagnosis: chronic intractable migraine    Post-Op Diagnosis:  Same    Procedure:  Botox injections for headache    EBL: None    Complications: None    Specimens: None    Description of Procedure:    The patient was placed in the sitting position and the areas over the planned injections were all prepped with alcohol.  botox was reconstituted in preservative free normal saline, 100 units was placed in 2 mLof saline on a 30-gauge needle.  5 Units each was placed into the following muscles:       Botox mapping:        - muscles:  5 units bilaterally, 10 units total  -Procerus muscle: 5 units  -Frontalis muscle:  4 sites, 5 units each, 20 units total  -Temporalis muscle: 5 units , 4 sites bilaterally, 20 units total  -Occipitalis muscle: 5 units, 3 sites bilaterally, 30 units total  -Trapezius muscles:  5 units, 3 sites bilaterally, 30 units total  -cervical paraspinous:  5 units, 2 sites bilaterally, 20 units total    Total: 135 Units  Waste: 65 Units    Patient tolerated the procedure well and bleeding was nil no Band-Aids were applied the patient was discharged in stable condition.        Interval history 07/09/2024  Patient  presents for MRI brain with and without contrast review and for Botox procedure:  Patient reports significant improvement in frequency, intensity of migraine headaches with initiation of Botox treatment.  Patient reports 80% improvement.  She reports headaches may return approximately after 2-1/2 months.  Even with recurrence, these headaches tend to be less intense with shorter durations.  She does report pain along primarily frontotemporal and cervical paraspinous musculature when headaches are present.  Patient has not needed to use Fioricet medication during this period of time.    Of note patient followed with Dr. Tilley 04/23/2024 with the following evaluation:      Patient has been taking Ambien as needed by Dr. Tilley to help with sleep hygiene.  She has inquiring about a refill of this medication.    PROCEDURE:  Informed consent was obtained and the procedure was described to the patient, a timeout was performed prior to starting the procedure.    Surgeon: Kale Jean-Baptiste MD      Pre-Op Diagnosis: chronic intractable migraine    Post-Op Diagnosis:  Same    Procedure:  Botox injections for headache    EBL: None    Complications: None    Specimens: None    Description of Procedure:    The patient was placed in the sitting position and the areas over the planned injections were all prepped with alcohol.  botox was reconstituted in preservative free normal saline, 100 units was placed in 2 mLof saline on a 30-gauge needle.  5 Units each was placed into the following muscles:       Botox mapping:        - muscles:  5 units bilaterally, 10 units total  -Procerus muscle: 5 units  -Frontalis muscle:  4 sites, 5 units each, 20 units total  -Temporalis muscle: 5 units , 4 sites bilaterally, 20 units total  -Occipitalis muscle: 5 units, 3 sites bilaterally, 30 units total  -Trapezius muscles:  5 units, 3 sites bilaterally, 30 units total  -cervical paraspinous:  5 units, 2 sites bilaterally, 20 units  total    Total: 135 Units  Waste: 65 Units    Patient tolerated the procedure well and bleeding was nil no Band-Aids were applied the patient was discharged in stable condition.      Interval history 04/09/2024  Patient presents for Botox procedure:  Patient reports significant improvement in frequency, intensity of migraine headaches with initiation of Botox treatment.  Patient reports 80% improvement.  She reports headaches may return approximately after 2-1/2 months.  Even with recurrence, these headaches tend to be less intense with shorter durations.  She does report pain along primarily frontotemporal and cervical paraspinous musculature when headaches are present.  She reports stress has been increased as her mother-in-law has been sick in [place].  Patient continues to work 2 jobs during this period as well.  She does report difficulty scheduling follow with Dr. Mckeon, with whom she discussed headaches and insomnia.     PROCEDURE:  Informed consent was obtained and the procedure was described to the patient, a timeout was performed prior to starting the procedure.    Surgeon: Kale Jean-Baptiste MD      Pre-Op Diagnosis: chronic intractable migraine    Post-Op Diagnosis:  Same    Procedure:  Botox injections for headache    EBL: None    Complications: None    Specimens: None    Description of Procedure:    The patient was placed in the sitting position and the areas over the planned injections were all prepped with alcohol.  botox was reconstituted in preservative free normal saline, 100 units was placed in 2 mLof saline on a 30-gauge needle.  5 Units each was placed into the following muscles:       Botox mapping:        - muscles:  5 units bilaterally, 10 units total  -Procerus muscle: 5 units  -Frontalis muscle:  4 sites, 5 units each, 20 units total  -Temporalis muscle: 5 units , 4 sites bilaterally, 20 units total  -Occipitalis muscle: 5 units, 3 sites bilaterally, 30 units total  -Trapezius  muscles:  5 units, 3 sites bilaterally, 30 units total  -cervical paraspinous:  5 units, 2 sites bilaterally, 20 units total    Total: 135 Units    Patient tolerated the procedure well and bleeding was nil no Band-Aids were applied the patient was discharged in stable condition.          Interval history 08/21/2023   Patient presents for Botox treatment. Patient reports diagnosis of migraines exceeding 20 years in duration.  Patient reports her migraines occur at least 2-3 days per week and lasts in between 3-5 days in duration.  She reports greater than 15 days per month with migraine headaches.  Pain is typically in the frontotemporal, retro-orbital and periauricular territories.  Patient reports when present headaches are typically unilateral but can vary from right to left.  Pain today is rated 0/10 but at its worse is a 10/10.  Pain is described as dull and throbbing and pounding in nature.  Headaches are compounded by diplopia, nausea, photophobia, phonophobia and neck stiffness.  Headaches can be brought on by weather or stress.  Pain is marginally improved with rest as well as taking propranolol 3 times daily.  Patient has failed to have improvement with Imitrex and Topamax in the past    PROCEDURE:  Informed consent was obtained and the procedure was described to the patient, a timeout was performed prior to starting the procedure.    Surgeon: Kale Jean-Baptiste MD      Pre-Op Diagnosis: chronic intractable migraine, atypical cluster headache    Post-Op Diagnosis:  Same    Procedure:  Botox injections for headache    EBL: None    Complications: None    Specimens: None    Description of Procedure:    The patient was placed in the sitting position and the areas over the planned injections were all prepped with alcohol.  botox was reconstituted in preservative free normal saline, 100 units was placed in 2 mLof saline on a 30-gauge needle.  5 Units each was placed into the following muscles:       Botox  "mapping:        - muscles:  5 units bilaterally, 10 units total  -Procerus muscle: 5 units  -Frontalis muscle:  4 sites, 5 units each, 20 units total  -Temporalis muscle: 5 units , 4 sites bilaterally, 20 units total  -Occipitalis muscle: 5 units, 3 sites bilaterally, 30 units total  -Trapezius muscles:  5 units, 3 sites bilaterally, 30 units total  -cervical paraspinous:  5 units, 2 sites bilaterally, 20 units total    Total: 135 Units  Waste: 65 Units    Patient tolerated the procedure well and bleeding was nil no Band-Aids were applied the patient was discharged in stable condition.        HPI 07/24/2023  Altagracia Islas is a 54 y.o. female with past medical history significant for migraine HA, CAD s/p MI s/p PTCA who presents to the clinic for the evaluation of headaches.  Patient reports diagnosis of migraines exceeding 20 years in duration.  Patient reports her migraines occur at least 2-3 days per week and lasts in between 3-5 days in duration.  She reports greater than 15 days per month with migraine headaches.  Pain is typically in the frontotemporal, retro-orbital and periauricular territories.  Patient reports when present headaches are typically unilateral but can vary from right to left.  Pain today is rated 0/10 but at its worse is a 10/10.  Pain is described as dull and throbbing and pounding in nature.  Headaches are compounded by diplopia, nausea, photophobia, phonophobia and neck stiffness.  Headaches can be brought on by weather or stress.  Pain is marginally improved with rest as well as taking propranolol 3 times daily.  Patient has failed to have improvement with Imitrex and Topamax in the past.  Of note patient reports complex prior history of having trifecta of dizziness, migraines and chest pain with echo and stress test unremarkable.  Patient had a heart catheterization demonstrating a blood clot in the right coronary artery." Since that time patient reports she has been advised " to avoid migraine medications and birth control, which may have been contributing to her symptoms as well.  Today she denies more distal radiculopathy into the upper extremities or hands, compromise in hand  strength or dexterity.  Of note patient has not had a follow-up with Neurology since 2019 during her cardiac workup.  Patient has performed physician directed cervical traction and strengthening exercises over the last 8 weeks without meaningful improvement in her headaches or pain.    Patient denies night fever/night sweats, urinary incontinence, bowel incontinence, significant weight loss, significant motor weakness, and loss of sensations.    Pain Disability Index Review:         10/15/2024    12:09 PM 2024    12:32 PM 2024     1:41 PM   Last 3 PDI Scores   Pain Disability Index (PDI) 18 38 14       Non-Pharmacologic Treatments:  Physical Therapy/Home Exercise: yes  Ice/Heat:yes  TENS: no  Acupuncture: no  Massage: no  Chiropractic: no    Other: no      Pain Medications:  - Opioids: Percocet (Oxycodone/Acetaminophen)  - Adjuvant Medications: Cyclobenzaprine (Flexeril)  - Anti-Coagulants: Aspirin    Pain Procedures:   None    Past Medical History:   Diagnosis Date    Heart attack     Hx of heart artery stent     Migraine     Skin abnormalities      Past Surgical History:   Procedure Laterality Date    BREAST SURGERY       SECTION      COLONOSCOPY N/A 2024    Procedure: COLONOSCOPY;  Surgeon: Mona Santiago MD;  Location: [facility];  Service: Gastroenterology;  Laterality: N/A;    HYSTERECTOMY      paritial    TOTAL REDUCTION MAMMOPLASTY       Review of patient's allergies indicates:   Allergen Reactions    Latex      Other reaction(s): Rash, facial       Current Outpatient Medications   Medication Sig    aspirin (ECOTRIN) 81 MG EC tablet Take 81 mg by mouth.    benzonatate (TESSALON PERLES) 100 MG capsule Take 2 capsules (200 mg total) by mouth 3 (three) times daily as needed for  Cough.    cyclobenzaprine (FLEXERIL) 10 MG tablet Take 1 tablet (10 mg total) by mouth 3 (three) times daily.    flurbiprofen (ANSAID) 100 MG tablet Take 1 tablet (100 mg total) by mouth 3 (three) times daily as needed (migraine).    gabapentin (NEURONTIN) 100 MG capsule Take 1 capsule (100 mg total) by mouth 2 (two) times daily.    HYDROcodone-acetaminophen (NORCO) 5-325 mg per tablet Take 1 tablet by mouth every 6 (six) hours as needed for Pain.    ketoconazole (NIZORAL) 2 % cream APPLY ON THE SKIN BID PRN.  USE ON THE FACE    LORazepam (ATIVAN) 0.5 MG tablet Take 1 tablet (0.5 mg total) by mouth nightly as needed for Anxiety.    propranolol (INDERAL) 20 MG tablet propranolol    tamsulosin (FLOMAX) 0.4 mg Cap Take 1 capsule (0.4 mg total) by mouth once daily.    triamcinolone acetonide 0.1% (KENALOG) 0.1 % ointment     ubrogepant (UBRELVY) 100 mg tablet Take 1 tablet (100 mg total) by mouth as needed for Migraine. If symptoms persist or return, may repeat dose after 2 hours. Maximum: 200 mg per 24 hours    zolpidem (AMBIEN) 10 mg Tab Take 1 tablet (10 mg total) by mouth nightly as needed (insomnia).     Current Facility-Administered Medications   Medication    onabotulinumtoxina injection 100 Units    onabotulinumtoxina injection 100 Units    onabotulinumtoxina injection 200 Units       Review of Systems     GENERAL:  No weight loss, malaise or fevers.  HEENT:   No recent changes in vision or hearing  NECK:  Negative for lumps, no difficulty with swallowing.  RESPIRATORY:  Negative for cough, wheezing or shortness of breath, patient denies any recent URI.  CARDIOVASCULAR:  Negative for chest pain or palpitations.  GI:  Negative for abdominal discomfort, blood in stools or black stools or change in bowel habits.  MUSCULOSKELETAL:  See HPI.  SKIN:  Negative for lesions, rash, and itching.  PSYCH:  No mood disorder or recent psychosocial stressors.   HEMATOLOGY/LYMPHOLOGY:  Negative for prolonged bleeding, bruising  "easily or swollen nodes.    NEURO:   No history of syncope, paralysis, seizures or tremors.  All other reviewed and negative other than HPI.    OBJECTIVE:    BP (!) 149/87 (BP Location: Right arm, Patient Position: Sitting)   Pulse 62   Resp 17   Ht 5' 2" (1.575 m)   Wt 75.5 kg (166 lb 7.2 oz)   BMI 30.44 kg/m²       Physical Exam    GENERAL: Well appearing, in no acute distress, alert and oriented x3.  PSYCH:  Mood and affect appropriate.  SKIN: Skin color, texture, turgor normal, no rashes or lesions.  HEAD/FACE:  Normocephalic, atraumatic. Cranial nerves grossly intact.    NECK: No pain to palpation over the cervical paraspinous muscles. Spurling Negative. No pain with neck flexion, extension, or lateral flexion.   Normaltesting biceps, triceps and brachioradialis bilaterally.    NegativeHoffmann's bilaterally.    5/5 strength testing deltoid, biceps, triceps, wrist extensor, wrist flexor and ulnar intrinsics bilaterally.    Normal  strength bilaterally    CV: RRR with palpation of the radial artery.  PULM: No evidence of respiratory difficulty, symmetric chest rise.  GI:  Soft and non-tender.    NEURO: Bilateral upper and lower extremity coordination and muscle stretch reflexes are physiologic and symmetric. No loss of sensation is noted.  GAIT: normal.    Imaging:   MRI brain with and without contrast 05/01/2024  FINDINGS:  Cerebral and ventricular volumes are within normal limits for the patient's age.  No evidence of hydrocephalus.     No evidence of acute territorial infarct, intracranial hemorrhage, or mass lesion.  No abnormal enhancement, restricted diffusion, or susceptibility artifact.  No significant midline shift or mass effect.     Midline structures and posterior fossa structures are unremarkable.  Basal cisterns are clear.  Major vascular flow voids are unremarkable.     Cranium is unremarkable.  Orbits and globes are within normal limits.  Paranasal sinuses and mastoid air cells are " clear.     Impression:     Unremarkable MR appearance of the brain.  No acute findings or abnormal enhancement.    ASSESSMENT: 54 y.o. year old female with head pain, consistent with     1. Myofascial pain        2. Intractable migraine with status migrainosus, unspecified migraine type  onabotulinumtoxina injection 100 Units    onabotulinumtoxina injection 100 Units    Prior authorization Order        PLAN:   - Interventions: Schedule repeat botox in 3 months: 05/08/2025    Botox Treatments:  -02/06/2025  -10/16/2024  -08/21/2023  -04/09/2024  -07/09/2024    Patient is a candidate for Botox treatment secondary to greater than 15 migraine headaches per month, lasting 3-5 days in duration with failed response to agents including nonsteroidal anti-inflammatory medications, Imitrex, Topamax and Propranolol.  Patient notes 80% improvement in intensity, frequency of headaches with initiation of Botox treatments.    Pre-Op Diagnosis: chronic intractable migraine    Post-Op Diagnosis:  Same     Anticipated Botox mapping:        - muscles:  5 units bilaterally, 10 units total  -Procerus muscle: 5 units  -Frontalis muscle:  4 sites, 5 units each, 20 units total  -Temporalis muscle: 5 units , 4 sites bilaterally, 20 units total  -Occipitalis muscle: 5 units, 3 sites bilaterally, 30 units total  -Trapezius muscles:  5 units, 3 sites bilaterally, 30 units total  -cervical paraspinous:  5 units, 2 sites bilaterally, 20 units total    Total: 135 Units  Waste: 65 Units    - Anticoagulation use: Yes aspirin     report:  Reviewed and consistent with medication use as prescribed.    - Medications:  -Continue propranolol per Ms. Kimberly NP  -Continue Ambien per Dr. Carbajal  -Ubrelvy temporarily refilled (10 tablets) until follow-up with Neurology.      - Therapy:   -We discussed continuing physician directed at home physical therapy to help manage the patient/s painful condition. The patient was counseled that muscle  strengthening will improve the long term prognosis in regards to pain and may also help increase range of motion and mobility. .    -Imaging:  Diagnostic imaging (MRI brain with and without contrast) reviewed and discussed with the patient    - Consults/Referrals: (PRN)  -Neurology: migraine [facility]; Dr. Santos Tilley    - Follow up visit:       The above plan and management options were discussed at length with patient. Patient is in agreement with the above and verbalized understanding.    - I discussed the goals of interventional chronic pain management with the patient on today's visit. We discussed a multimodal and systematic approach to pain.  This includes diagnostic and therapeutic injections, adjuvant pharmacologic treatment, physical therapy, and at times psychiatry.  I emphasized the importance of regular exercise, core strengthening and stretching, diet and weight loss as a cornerstone of long-term pain management.    - This condition does not require this patient to take time off of work, and the primary goal of our Pain Management services is to improve the patient's functional capacity.  - Patient Questions: Answered all of the patient's questions regarding diagnoses, therapy, treatment and next steps        Kale Jean-Baptiste MD  Interventional Pain Management  [facility]    Disclaimer:  This note was prepared using voice recognition system and is likely to have sound alike errors that may have been overlooked even after proof reading.  Please call me with any questions

## 2025-02-05 NOTE — PROGRESS NOTES
"Subjective:      Patient ID: Altagracia Islas is a 54 y.o. female.    Vitals:  height is 5' 2" (1.575 m) and weight is 75.4 kg (166 lb 3.6 oz). Her oral temperature is 98.3 °F (36.8 °C). Her blood pressure is 141/63 (abnormal) and her pulse is 55 (abnormal). Her respiration is 18 and oxygen saturation is 100%.     Chief Complaint: Back Pain    Patient reports with mid left back pain starting last night. Patient states around 1-2 AM she was in excruciating pain. States she was on the floor in pain and felt almost like a spasm that wouldn't let up. Pain radiates into left side area. No injury or history of spine disorder or kidney stones. Patient states she had dark urine for a few days last week, but it stopped after she stopped drinking a new tea and began to drink more water. Patient states she took nausea medication and hydrocodone ('s old rx) which got the pain under control. Denies dysuria, hematuria, n/v/d, abdominal pain, rash, fever/chills.     Back Pain  This is a new problem. The current episode started today. The problem occurs constantly. The problem is unchanged (unchanged since she took the medicine). The quality of the pain is described as aching. The pain does not radiate. The pain is at a severity of 4/10. The pain is mild. The pain is Worse during the night. Stiffness is present All day. Pertinent negatives include no abdominal pain, bladder incontinence, bowel incontinence, chest pain, dysuria, fever, headaches, leg pain, numbness, paresis, paresthesias, pelvic pain, perianal numbness, tingling, weakness or weight loss. Treatments tried: hydrocodone and nausea medication. The treatment provided mild relief.       Constitution: Negative for appetite change, chills, sweating and fever.   Cardiovascular:  Negative for chest pain.   Gastrointestinal:  Negative for abdominal pain, nausea, vomiting, diarrhea and bowel incontinence.   Genitourinary:  Positive for flank pain. Negative for dysuria, " urine decreased, bladder incontinence, hematuria, history of kidney stones and pelvic pain.   Musculoskeletal:  Positive for back pain. Negative for trauma, abnormal ROM of joint and history of spine disorder.   Skin: Negative.    Neurological:  Negative for headaches, numbness and tingling.      Objective:     Physical Exam   Constitutional: She appears well-developed.  Non-toxic appearance. She does not appear ill. No distress.   HENT:   Head: Normocephalic and atraumatic.   Ears:   Right Ear: External ear normal.   Left Ear: External ear normal.   Nose: Nose normal.   Eyes: Conjunctivae and EOM are normal.   Neck: Neck supple.   Pulmonary/Chest: Effort normal and breath sounds normal.   Abdominal: Normal appearance and bowel sounds are normal. She exhibits no distension. Soft. flat abdomen There is abdominal tenderness (mild) in the left upper quadrant and left lower quadrant. There is left CVA tenderness. There is no rebound, no guarding and no right CVA tenderness.   Musculoskeletal: Normal range of motion.         General: Normal range of motion.      Thoracic back: She exhibits normal range of motion, no bony tenderness, no swelling, no deformity and no spasm.      Lumbar back: Normal.   Neurological: no focal deficit. She is alert. She displays no weakness. Gait normal.   Skin: Skin is warm, dry, not diaphoretic, not pale and no rash.   Psychiatric: Her behavior is normal.     Results for orders placed or performed in visit on 02/05/25   POCT Urinalysis(Instrument)    Collection Time: 02/05/25  1:15 PM   Result Value Ref Range    Color, POC UA Yellow Yellow, Straw, Colorless    Clarity, POC UA Slight Cloudy (A) Clear    Glucose, POC UA Negative Negative    Bilirubin, POC UA Negative Negative    Ketones, POC UA Trace (A) Negative    Spec Grav POC UA 1.025 1.005 - 1.030    Blood, POC UA Large (A) Negative    pH, POC UA 7.0 5.0 - 8.0    Protein, POC UA Trace (A) Negative    Urobilinogen, POC UA 1.0 <=1.0     Nitrite, POC UA Negative Negative    WBC, POC UA Negative Negative     XR KUB    Result Date: 2/5/2025  EXAMINATION: XR KUB CLINICAL HISTORY: Unspecified abdominal pain TECHNIQUE: Flat and erect AP views of the abdomen were performed. COMPARISON: CT abdomen pelvis from 01/08/2019; views of the abdomen from 01/03/19 FINDINGS: Bowel gas pattern is nonspecific.  Phleboliths in the pelvis.  Density projecting to the left of the lumbar spine measuring 3 mm-favored to represent nephrolithiasis.  No evidence of intraperitoneal free air.  Regional bones are unremarkable.     As above. Electronically signed by: Melvin Sepulveda Date:    02/05/2025 Time:    13:59     Assessment:     1. Left nephrolithiasis    2. Acute left flank pain    3. Other microscopic hematuria    4. Acute left-sided thoracic back pain    5. Abdominal pain, unspecified abdominal location        Plan:       Left nephrolithiasis  -     HYDROcodone-acetaminophen (NORCO) 5-325 mg per tablet; Take 1 tablet by mouth every 6 (six) hours as needed for Pain.  Dispense: 20 tablet; Refill: 0  -     CT Renal Stone Study ABD Pelvis WO; Future; Expected date: 02/05/2025  -     tamsulosin (FLOMAX) 0.4 mg Cap; Take 1 capsule (0.4 mg total) by mouth once daily.  Dispense: 30 capsule; Refill: 0    Acute left flank pain  -     POCT Urinalysis(Instrument)  -     XR KUB; Future; Expected date: 02/05/2025  -     Urine Culture High Risk  -     CT Renal Stone Study ABD Pelvis WO; Future; Expected date: 02/05/2025    Other microscopic hematuria  -     XR KUB; Future; Expected date: 02/05/2025  -     Urine Culture High Risk  -     CT Renal Stone Study ABD Pelvis WO; Future; Expected date: 02/05/2025    Acute left-sided thoracic back pain  -     POCT Urinalysis(Instrument)  -     XR KUB; Future; Expected date: 02/05/2025    Abdominal pain, unspecified abdominal location          Medical Decision Making:   Clinical Tests:   Lab Tests: Ordered and Reviewed  Radiological Study: Ordered  and Reviewed  Urgent Care Management:  Work up consistent with left kidney stone. Will order CT and refer to urology. Pt encouraged to schedule close f/u. If unable to get in with Urology can f/u with PCP.  Powell as needed for pain.  Flomax daily.  Advised to drink plenty of fluids.  Instructed to go to the ED if severe/worsening pain or new symptoms develop before follow-up appointment.

## 2025-02-05 NOTE — PATIENT INSTRUCTIONS
A referral has be placed for you to follow up with Urology. An order was also put in for CT scan to evaluate for possible kidney stone. Someone should be contacting you soon to set up appointment. However, you may call [phone] at anytime to schedule this follow up appointment.  If your condition worsens, or you develop new symptoms, we recommend that you receive another evaluation at the Emergency Room immediately or contact your primary medical clinic to discuss your concerns.    You must understand that you have received an Urgent Care treatment only and that you may be released before all of your medical problems are known or treated. You, the patient, will arrange for follow up care as instructed.     RED FLAGS/WARNING SYMPTOMS DISCUSSED WITH PATIENT THAT WOULD WARRANT EMERGENT MEDICAL ATTENTION. PATIENT VERBALIZED UNDERSTANDING.

## 2025-02-06 ENCOUNTER — OFFICE VISIT (OUTPATIENT)
Dept: PAIN MEDICINE | Facility: CLINIC | Age: 55
End: 2025-02-06
Payer: COMMERCIAL

## 2025-02-06 VITALS
BODY MASS INDEX: 30.63 KG/M2 | RESPIRATION RATE: 17 BRPM | HEART RATE: 62 BPM | DIASTOLIC BLOOD PRESSURE: 87 MMHG | WEIGHT: 166.44 LBS | SYSTOLIC BLOOD PRESSURE: 149 MMHG | HEIGHT: 62 IN

## 2025-02-06 DIAGNOSIS — M79.18 MYOFASCIAL PAIN: Primary | ICD-10-CM

## 2025-02-06 DIAGNOSIS — G43.911 INTRACTABLE MIGRAINE WITH STATUS MIGRAINOSUS, UNSPECIFIED MIGRAINE TYPE: ICD-10-CM

## 2025-02-06 PROCEDURE — 3077F SYST BP >= 140 MM HG: CPT | Mod: CPTII,S$GLB,, | Performed by: ANESTHESIOLOGY

## 2025-02-06 PROCEDURE — 99999 PR PBB SHADOW E&M-EST. PATIENT-LVL IV: CPT | Mod: PBBFAC,,, | Performed by: ANESTHESIOLOGY

## 2025-02-06 PROCEDURE — 99214 OFFICE O/P EST MOD 30 MIN: CPT | Mod: 25,S$GLB,, | Performed by: ANESTHESIOLOGY

## 2025-02-06 PROCEDURE — 64615 CHEMODENERV MUSC MIGRAINE: CPT | Mod: S$GLB,,, | Performed by: ANESTHESIOLOGY

## 2025-02-06 PROCEDURE — 1159F MED LIST DOCD IN RCRD: CPT | Mod: CPTII,S$GLB,, | Performed by: ANESTHESIOLOGY

## 2025-02-06 PROCEDURE — 3008F BODY MASS INDEX DOCD: CPT | Mod: CPTII,S$GLB,, | Performed by: ANESTHESIOLOGY

## 2025-02-06 PROCEDURE — 3079F DIAST BP 80-89 MM HG: CPT | Mod: CPTII,S$GLB,, | Performed by: ANESTHESIOLOGY

## 2025-02-06 RX ORDER — UBROGEPANT 100 MG/1
100 TABLET ORAL
Qty: 10 TABLET | Refills: 0 | Status: SHIPPED | OUTPATIENT
Start: 2025-02-06

## 2025-02-07 ENCOUNTER — PATIENT MESSAGE (OUTPATIENT)
Dept: URGENT CARE | Facility: CLINIC | Age: 55
End: 2025-02-07
Payer: COMMERCIAL

## 2025-02-07 LAB
BACTERIA UR CULT: NORMAL
BACTERIA UR CULT: NORMAL

## 2025-02-10 ENCOUNTER — HOSPITAL ENCOUNTER (OUTPATIENT)
Dept: RADIOLOGY | Facility: HOSPITAL | Age: 55
Discharge: HOME | End: 2025-02-10
Attending: PHYSICIAN ASSISTANT
Payer: COMMERCIAL

## 2025-02-10 DIAGNOSIS — N20.0 LEFT NEPHROLITHIASIS: ICD-10-CM

## 2025-02-10 DIAGNOSIS — R10.9 ACUTE LEFT FLANK PAIN: ICD-10-CM

## 2025-02-10 DIAGNOSIS — R31.29 OTHER MICROSCOPIC HEMATURIA: ICD-10-CM

## 2025-02-10 PROCEDURE — 74176 CT ABD & PELVIS W/O CONTRAST: CPT | Mod: TC

## 2025-02-10 PROCEDURE — 74176 CT ABD & PELVIS W/O CONTRAST: CPT | Mod: 26,,, | Performed by: RADIOLOGY

## 2025-02-12 ENCOUNTER — TELEPHONE (OUTPATIENT)
Dept: FAMILY MEDICINE | Facility: CLINIC | Age: 55
End: 2025-02-12
Payer: COMMERCIAL

## 2025-02-12 NOTE — TELEPHONE ENCOUNTER
----- Message from Nell sent at 2/12/2025 10:41 AM CST -----  Sooner Appointment Request     Caller is requesting a sooner appointment.  Caller declined first available appointment listed below.  Caller will not accept being placed on the waitlist and is requesting a message be sent to doctor.  Name of Caller:JUDY GARVEY [[id number]]  When is the first available appointment?N/A  Symptoms:Annual/ Check up/ Needs to be seen as soon as possible  Would the patient rather a call back or a response via [facility]? Call  Best Call Back Number:Telephone Information:  Mobile          [phone]      Additional Information: Pt was seen at the urgent care last week and was told pt needs to see a Urology

## 2025-02-13 ENCOUNTER — OFFICE VISIT (OUTPATIENT)
Dept: FAMILY MEDICINE | Facility: CLINIC | Age: 55
End: 2025-02-13
Attending: FAMILY MEDICINE
Payer: COMMERCIAL

## 2025-02-13 VITALS
TEMPERATURE: 98 F | WEIGHT: 165.81 LBS | RESPIRATION RATE: 18 BRPM | HEART RATE: 75 BPM | BODY MASS INDEX: 30.51 KG/M2 | SYSTOLIC BLOOD PRESSURE: 132 MMHG | HEIGHT: 62 IN | DIASTOLIC BLOOD PRESSURE: 80 MMHG | OXYGEN SATURATION: 99 %

## 2025-02-13 DIAGNOSIS — N20.0 LEFT NEPHROLITHIASIS: Primary | ICD-10-CM

## 2025-02-13 DIAGNOSIS — G47.00 INSOMNIA, UNSPECIFIED TYPE: ICD-10-CM

## 2025-02-13 DIAGNOSIS — E66.811 OBESITY (BMI 30.0-34.9): ICD-10-CM

## 2025-02-13 DIAGNOSIS — F41.9 ANXIETY: ICD-10-CM

## 2025-02-13 PROCEDURE — 3008F BODY MASS INDEX DOCD: CPT | Mod: CPTII,S$GLB,, | Performed by: FAMILY MEDICINE

## 2025-02-13 PROCEDURE — 1160F RVW MEDS BY RX/DR IN RCRD: CPT | Mod: CPTII,S$GLB,, | Performed by: FAMILY MEDICINE

## 2025-02-13 PROCEDURE — 3075F SYST BP GE 130 - 139MM HG: CPT | Mod: CPTII,S$GLB,, | Performed by: FAMILY MEDICINE

## 2025-02-13 PROCEDURE — 99214 OFFICE O/P EST MOD 30 MIN: CPT | Mod: S$GLB,,, | Performed by: FAMILY MEDICINE

## 2025-02-13 PROCEDURE — G2211 COMPLEX E/M VISIT ADD ON: HCPCS | Mod: S$GLB,,, | Performed by: FAMILY MEDICINE

## 2025-02-13 PROCEDURE — 3079F DIAST BP 80-89 MM HG: CPT | Mod: CPTII,S$GLB,, | Performed by: FAMILY MEDICINE

## 2025-02-13 PROCEDURE — 1159F MED LIST DOCD IN RCRD: CPT | Mod: CPTII,S$GLB,, | Performed by: FAMILY MEDICINE

## 2025-02-13 PROCEDURE — 99999 PR PBB SHADOW E&M-EST. PATIENT-LVL V: CPT | Mod: PBBFAC,,, | Performed by: FAMILY MEDICINE

## 2025-02-13 RX ORDER — ZOLPIDEM TARTRATE 10 MG/1
10 TABLET ORAL NIGHTLY PRN
Qty: 30 TABLET | Refills: 3 | Status: SHIPPED | OUTPATIENT
Start: 2025-02-13

## 2025-02-13 RX ORDER — LORAZEPAM 0.5 MG/1
0.5 TABLET ORAL NIGHTLY PRN
Qty: 20 TABLET | Refills: 0 | Status: SHIPPED | OUTPATIENT
Start: 2025-02-13 | End: 2025-03-15

## 2025-02-13 NOTE — PROGRESS NOTES
Altagracia Islas    Chief Complaint   Patient presents with    Follow-up    Urgent Care       History of Present Illness:   Ms. Islas comes in today for urgent care follow-up for kidney stones.  She states she was evaluated at [facility] on 2/5/2025 for back pain with x-ray performed which showed kidney stones and confirmed with CT scan.  She states she was told to follow-up with 3 urology and given prescription for tamsulosin to help with symptoms.  She states she has slight aching at her left lower back today.  She states she has had partial hysterectomy in the past.    She requests refills Ambien for insomnia and lorazepam for anxiety.  She states she rarely takes lorazepam.    Otherwise, she denies having fever, chills, fatigue, appetite changes; shortness of breath, cough, wheezing; chest pain, palpitations, leg swelling; abdominal pain, nausea, vomiting, diarrhea, constipation; unusual urinary symptoms; polydipsia, polyphagia, polyuria, hot or cold intolerance; rashes; numbness, headache; anxiety, depression, homicidal or suicidal thoughts.                 Current Outpatient Medications   Medication Sig    aspirin (ECOTRIN) 81 MG EC tablet Take 81 mg by mouth.    cyclobenzaprine (FLEXERIL) 10 MG tablet Take 1 tablet (10 mg total) by mouth 3 (three) times daily.    gabapentin (NEURONTIN) 100 MG capsule Take 1 capsule (100 mg total) by mouth 2 (two) times daily.    ketoconazole (NIZORAL) 2 % cream APPLY ON THE SKIN BID PRN.  USE ON THE FACE    LORazepam (ATIVAN) 0.5 MG tablet Take 1 tablet (0.5 mg total) by mouth nightly as needed for Anxiety.    propranolol (INDERAL) 20 MG tablet propranolol    tamsulosin (FLOMAX) 0.4 mg Cap Take 1 capsule (0.4 mg total) by mouth once daily.    triamcinolone acetonide 0.1% (KENALOG) 0.1 % ointment     ubrogepant (UBRELVY) 100 mg tablet Take 1 tablet (100 mg total) by mouth as needed for Migraine. If symptoms persist or return, may repeat dose after 2 hours. Maximum: 200  mg per 24 hours    zolpidem (AMBIEN) 10 mg Tab Take 1 tablet (10 mg total) by mouth nightly as needed (insomnia).       Review of Systems   Constitutional:  Negative for activity change, appetite change, chills, fatigue and fever.   Respiratory:  Negative for cough, shortness of breath and wheezing.    Cardiovascular:  Negative for chest pain, palpitations and leg swelling.   Gastrointestinal:  Negative for abdominal pain, constipation, diarrhea, nausea and vomiting.   Endocrine: Negative for cold intolerance, heat intolerance, polydipsia, polyphagia and polyuria.   Genitourinary:  Positive for flank pain. Negative for difficulty urinating.   Musculoskeletal:  Positive for back pain.   Neurological:  Negative for numbness and headaches.   Psychiatric/Behavioral:  Negative for dysphoric mood and suicidal ideas. The patient is not nervous/anxious.         Negative for homicidal ideas.       Objective:  Physical Exam  Vitals reviewed.   Constitutional:       General: She is not in acute distress.     Appearance: Normal appearance. She is obese. She is not ill-appearing, toxic-appearing or diaphoretic.      Comments: Pleasant.   Cardiovascular:      Rate and Rhythm: Normal rate and regular rhythm.      Pulses: Normal pulses.      Heart sounds: No murmur heard.  Pulmonary:      Effort: Pulmonary effort is normal. No respiratory distress.      Breath sounds: Normal breath sounds. No wheezing.   Abdominal:      General: Bowel sounds are normal. There is no distension.      Palpations: Abdomen is soft. There is no mass.      Tenderness: There is no abdominal tenderness. There is left CVA tenderness. There is no right CVA tenderness, guarding or rebound.   Musculoskeletal:         General: No swelling or tenderness. Normal range of motion.      Cervical back: Normal range of motion and neck supple. No tenderness.      Comments: She is ambulatory without problems.   Lymphadenopathy:      Cervical: No cervical adenopathy.    Skin:     General: Skin is warm.   Neurological:      General: No focal deficit present.      Mental Status: She is alert and oriented to person, place, and time.   Psychiatric:         Mood and Affect: Mood normal.         Behavior: Behavior normal.         Thought Content: Thought content normal.         Judgment: Judgment normal.         ASSESSMENT:  1. Left nephrolithiasis    2. Insomnia, unspecified type    3. Anxiety    4. Obesity (BMI 30.0-34.9)        PLAN:  Altagracia was seen today for follow-up and urgent care.    Diagnoses and all orders for this visit:    Left nephrolithiasis  -     Ambulatory referral/consult to Urology; Future    Insomnia, unspecified type  -     zolpidem (AMBIEN) 10 mg Tab; Take 1 tablet (10 mg total) by mouth nightly as needed (insomnia).    Anxiety  -     LORazepam (ATIVAN) 0.5 MG tablet; Take 1 tablet (0.5 mg total) by mouth nightly as needed for Anxiety.    Obesity (BMI 30.0-34.9)        Continue current medications, follow low sodium, low cholesterol, low carb diet, daily walks.  Prescription refills as noted above.  Keep follow up with specialists.  Follow up if symptoms worsen or fail to improve.

## 2025-02-20 ENCOUNTER — TELEPHONE (OUTPATIENT)
Dept: UROLOGY | Facility: CLINIC | Age: 55
End: 2025-02-20
Payer: COMMERCIAL

## 2025-02-20 ENCOUNTER — OFFICE VISIT (OUTPATIENT)
Dept: UROLOGY | Facility: CLINIC | Age: 55
End: 2025-02-20
Payer: COMMERCIAL

## 2025-02-20 ENCOUNTER — HOSPITAL ENCOUNTER (OUTPATIENT)
Dept: RADIOLOGY | Facility: HOSPITAL | Age: 55
Discharge: HOME | End: 2025-02-20
Attending: UROLOGY
Payer: COMMERCIAL

## 2025-02-20 ENCOUNTER — TELEPHONE (OUTPATIENT)
Dept: SURGERY | Facility: CLINIC | Age: 55
End: 2025-02-20
Payer: COMMERCIAL

## 2025-02-20 VITALS — DIASTOLIC BLOOD PRESSURE: 80 MMHG | HEART RATE: 62 BPM | SYSTOLIC BLOOD PRESSURE: 141 MMHG

## 2025-02-20 DIAGNOSIS — N20.1 LEFT URETERAL CALCULUS: Primary | ICD-10-CM

## 2025-02-20 DIAGNOSIS — N20.0 LEFT NEPHROLITHIASIS: ICD-10-CM

## 2025-02-20 DIAGNOSIS — N23 RENAL COLIC ON LEFT SIDE: ICD-10-CM

## 2025-02-20 DIAGNOSIS — N20.1 LEFT URETERAL CALCULUS: ICD-10-CM

## 2025-02-20 LAB
BILIRUB UR QL STRIP: NEGATIVE
GLUCOSE UR QL STRIP: NEGATIVE
KETONES UR QL STRIP: NEGATIVE
LEUKOCYTE ESTERASE UR QL STRIP: NEGATIVE
PH, POC UA: 7
POC BLOOD, URINE: POSITIVE
POC NITRATES, URINE: NEGATIVE
PROT UR QL STRIP: NEGATIVE
SP GR UR STRIP: 1.01 (ref 1–1.03)
UROBILINOGEN UR STRIP-ACNC: 1 (ref 0.1–1.1)

## 2025-02-20 PROCEDURE — 74018 RADEX ABDOMEN 1 VIEW: CPT | Mod: TC

## 2025-02-20 PROCEDURE — 87086 URINE CULTURE/COLONY COUNT: CPT | Performed by: UROLOGY

## 2025-02-20 NOTE — H&P (VIEW-ONLY)
Chief Complaint:   Encounter Diagnoses   Name Primary?    Left ureteral calculus Yes    Renal colic on left side        HPI:  HPI Altagracia Islas anni 54 y.o. female who presents with follow up from an ER visit over the weekend.  Patient was initially diagnosed with a kidney stone 2 weeks ago after being seen in urgent care.  CT demonstrated a 5 mm mid ureteral calculus.  She was doing well up until this past weekend where she had severe bout of pain and went to the [facility].  She states she was diagnosed with the same stone that was further down.  She was placed on trial of passage.  She has had no prior history of kidney stones.  CT did demonstrate bilateral nonobstructing lower pole renal stones.    History:  Social History[1]  Past Medical History:   Diagnosis Date    Heart attack     Hx of heart artery stent     Migraine     Skin abnormalities      Past Surgical History:   Procedure Laterality Date    BREAST SURGERY       SECTION      COLONOSCOPY N/A 2024    Procedure: COLONOSCOPY;  Surgeon: Mona Santiago MD;  Location: [facility];  Service: Gastroenterology;  Laterality: N/A;    HYSTERECTOMY      paritial    TOTAL REDUCTION MAMMOPLASTY       Family History   Problem Relation Name Age of Onset    Breast cancer Maternal Grandmother      Breast cancer Other mat great aunt        Medications Ordered Prior to Encounter[2]     Objective:     Vitals:    25 1031   BP: (!) 141/80   Pulse: 62      BMI Readings from Last 1 Encounters:   25 30.32 kg/m²          Physical Exam  No acute distress alert and oriented   Respirations even unlabored   Abdomen is soft nontender    Urinalysis shows trace blood    CT was independently reviewed and reviewed with the patient    Lab Results   Component Value Date    CREATININE 0.7 2022      Assessment:       1. Left ureteral calculus    2. Renal colic on left side        Plan:     1. Left ureteral calculus    2. Renal colic on left side        Orders Placed This Encounter    Urine Culture High Risk    X-Ray Abdomen AP 1 View    Diet NPO    POCT Urinalysis, Dipstick, Automated, W/O Scope    Case Request Operating Room: LITHOTRIPSY, ESWL     5 mm mid to distal ureteral stone with intermittent renal colic.  Discussed treatment options with the patient extensively including risks and benefits of trial of passage, ureteroscopy and ESWL.  Patient wished to proceed with the ESWL as soon as possible.  We will get cardiac clearance given her history of cardiac stent.  Okay to stay on 81 mg aspirin.       [1]   Social History  Tobacco Use    Smoking status: Never     Passive exposure: Never    Smokeless tobacco: Never   Substance Use Topics    Alcohol use: No    Drug use: No   [2]   Current Outpatient Medications on File Prior to Visit   Medication Sig Dispense Refill    aspirin (ECOTRIN) 81 MG EC tablet Take 81 mg by mouth.      cyclobenzaprine (FLEXERIL) 10 MG tablet Take 1 tablet (10 mg total) by mouth 3 (three) times daily. 10 tablet 0    gabapentin (NEURONTIN) 100 MG capsule Take 1 capsule (100 mg total) by mouth 2 (two) times daily. 60 capsule 11    ketoconazole (NIZORAL) 2 % cream APPLY ON THE SKIN BID PRN.  USE ON THE FACE      LORazepam (ATIVAN) 0.5 MG tablet Take 1 tablet (0.5 mg total) by mouth nightly as needed for Anxiety. 20 tablet 0    propranolol (INDERAL) 20 MG tablet propranolol      tamsulosin (FLOMAX) 0.4 mg Cap Take 1 capsule (0.4 mg total) by mouth once daily. 30 capsule 0    triamcinolone acetonide 0.1% (KENALOG) 0.1 % ointment       ubrogepant (UBRELVY) 100 mg tablet Take 1 tablet (100 mg total) by mouth as needed for Migraine. If symptoms persist or return, may repeat dose after 2 hours. Maximum: 200 mg per 24 hours 10 tablet 0    zolpidem (AMBIEN) 10 mg Tab Take 1 tablet (10 mg total) by mouth nightly as needed (insomnia). 30 tablet 3     Current Facility-Administered Medications on File Prior to Visit   Medication Dose Route  Frequency Provider Last Rate Last Admin    onabotulinumtoxina injection 200 Units  200 Units Intramuscular 1 time in Clinic/HOD Kale Jean-Baptiste MD

## 2025-02-20 NOTE — PRE-PROCEDURE INSTRUCTIONS
Called and spoke with patient about the following:     Please arrive to [facility] ([facility] at 12:45pm on 2/21/25 for your scheduled procedure.  Address: [address] [place] [address] [place] [address] (2nd Building on left, 1st Floor Lobby)    !!!NO FOOD after midnight! You may have clear liquids up to 3 hrs before your arrival to the Hospital!!!  Clear liquids include Gatorade, water, soda, black coffee or tea (no milk or creamer), and clear juices.  Clear liquids do NOT include anything with pulp or food particles (Chicken broth, ice cream, yogurt, Jello, etc.)    Thank you,  -[facility] Pre [facility]  Mon-Fri 7:30 am - 4 pm [phone]

## 2025-02-20 NOTE — PROGRESS NOTES
Chief Complaint:   Encounter Diagnoses   Name Primary?    Left ureteral calculus Yes    Renal colic on left side        HPI:  HPI Altagracia Islas anni 54 y.o. female who presents with follow up from an ER visit over the weekend.  Patient was initially diagnosed with a kidney stone 2 weeks ago after being seen in urgent care.  CT demonstrated a 5 mm mid ureteral calculus.  She was doing well up until this past weekend where she had severe bout of pain and went to the [facility].  She states she was diagnosed with the same stone that was further down.  She was placed on trial of passage.  She has had no prior history of kidney stones.  CT did demonstrate bilateral nonobstructing lower pole renal stones.    History:  Social History[1]  Past Medical History:   Diagnosis Date    Heart attack     Hx of heart artery stent     Migraine     Skin abnormalities      Past Surgical History:   Procedure Laterality Date    BREAST SURGERY       SECTION      COLONOSCOPY N/A 2024    Procedure: COLONOSCOPY;  Surgeon: Mona Santiago MD;  Location: [facility];  Service: Gastroenterology;  Laterality: N/A;    HYSTERECTOMY      paritial    TOTAL REDUCTION MAMMOPLASTY       Family History   Problem Relation Name Age of Onset    Breast cancer Maternal Grandmother      Breast cancer Other mat great aunt        Medications Ordered Prior to Encounter[2]     Objective:     Vitals:    25 1031   BP: (!) 141/80   Pulse: 62      BMI Readings from Last 1 Encounters:   25 30.32 kg/m²          Physical Exam  No acute distress alert and oriented   Respirations even unlabored   Abdomen is soft nontender    Urinalysis shows trace blood    CT was independently reviewed and reviewed with the patient    Lab Results   Component Value Date    CREATININE 0.7 2022      Assessment:       1. Left ureteral calculus    2. Renal colic on left side        Plan:     1. Left ureteral calculus    2. Renal colic on left side        Orders Placed This Encounter    Urine Culture High Risk    X-Ray Abdomen AP 1 View    Diet NPO    POCT Urinalysis, Dipstick, Automated, W/O Scope    Case Request Operating Room: LITHOTRIPSY, ESWL     5 mm mid to distal ureteral stone with intermittent renal colic.  Discussed treatment options with the patient extensively including risks and benefits of trial of passage, ureteroscopy and ESWL.  Patient wished to proceed with the ESWL as soon as possible.  We will get cardiac clearance given her history of cardiac stent.  Okay to stay on 81 mg aspirin.       [1]   Social History  Tobacco Use    Smoking status: Never     Passive exposure: Never    Smokeless tobacco: Never   Substance Use Topics    Alcohol use: No    Drug use: No   [2]   Current Outpatient Medications on File Prior to Visit   Medication Sig Dispense Refill    aspirin (ECOTRIN) 81 MG EC tablet Take 81 mg by mouth.      cyclobenzaprine (FLEXERIL) 10 MG tablet Take 1 tablet (10 mg total) by mouth 3 (three) times daily. 10 tablet 0    gabapentin (NEURONTIN) 100 MG capsule Take 1 capsule (100 mg total) by mouth 2 (two) times daily. 60 capsule 11    ketoconazole (NIZORAL) 2 % cream APPLY ON THE SKIN BID PRN.  USE ON THE FACE      LORazepam (ATIVAN) 0.5 MG tablet Take 1 tablet (0.5 mg total) by mouth nightly as needed for Anxiety. 20 tablet 0    propranolol (INDERAL) 20 MG tablet propranolol      tamsulosin (FLOMAX) 0.4 mg Cap Take 1 capsule (0.4 mg total) by mouth once daily. 30 capsule 0    triamcinolone acetonide 0.1% (KENALOG) 0.1 % ointment       ubrogepant (UBRELVY) 100 mg tablet Take 1 tablet (100 mg total) by mouth as needed for Migraine. If symptoms persist or return, may repeat dose after 2 hours. Maximum: 200 mg per 24 hours 10 tablet 0    zolpidem (AMBIEN) 10 mg Tab Take 1 tablet (10 mg total) by mouth nightly as needed (insomnia). 30 tablet 3     Current Facility-Administered Medications on File Prior to Visit   Medication Dose Route  Frequency Provider Last Rate Last Admin    onabotulinumtoxina injection 200 Units  200 Units Intramuscular 1 time in Clinic/HOD Kale Jean-Baptiste MD

## 2025-02-20 NOTE — TELEPHONE ENCOUNTER
Received message from Dr. Rodarte, cardiologist; stated pt needed to be seen in the office to get clearance; Dr. Giordano notified.

## 2025-02-20 NOTE — TELEPHONE ENCOUNTER
Called and left as message about the following:     Please arrive to [facility] ([facility] at 12:45pm on 2/21/25 for your scheduled procedure.  Address: [address] [place] [address] [place] [address] (2nd Building on left, 1st Floor Lobby)    !!!NO FOOD after midnight! You may have clear liquids up to 3 hrs before your arrival to the Hospital!!!  Clear liquids include Gatorade, water, soda, black coffee or tea (no milk or creamer), and clear juices.  Clear liquids do NOT include anything with pulp or food particles (Chicken broth, ice cream, yogurt, Jello, etc.)

## 2025-02-20 NOTE — PRE-PROCEDURE INSTRUCTIONS
Pre op instructions reviewed with patient over telephone, verbalized understanding.    Procedure Date: 2/21/2025  Arrival Time:  TBD; We will call you after 2pm the day before your procedure with your arrival time.    Address:   [facility] (Off [facility], 2nd Building on the left)  [id number] [place] [address] [place]. [address]  >>>Please enter through front entrance Lobby of 1st floor to Registration desk<<<    Your Type & Screen appointment is scheduled on n/a @ [facility] (Must be done at [facility] or [facility] location!). Please DO NOT remove the red arm band applied.    Testing/Clearances needed: n/a      !!!INSTRUCTIONS IMPORTANT!!!  NO FOOD or tobacco products after midnight the night before surgery! You may have clear liquids up to 3 hrs before your arrival to the Hospital  Clear liquids include Gatorade, water, soda, black coffee or tea (no milk or creamer), and clear juices.  Clear liquids do NOT include anything with pulp or food particles (Chicken broth, ice cream, yogurt, Jello, etc.)    >>>MEDICATION INSTRUCTIONS<<<: Morning of Surgery, take small sip of water with ONLY these medications:  inderal    Additional Instructions: n/a    *Blood Thinners: Stop taking n/a n/a days prior to surgery per Physician Instructions! Call your Surgeon office to inquire about any questions regarding your blood thinner medication.    *ADHD Medication: Stop taking ADHD/ADD medications 48 hrs prior to surgery, as this can affect the anesthesia used. Bariatric surgeries must HOLD 7 Days prior to surgery!    *Diabetic/ Prediabetic Patients: !!!If you take diabetic or weight loss medication, Do NOT take morning of surgery unless instructed by Doctor!!!  Metformin to be stopped 24 hrs prior to surgery.   Long Acting Insulin Instructions: HOLD the night before surgery unless instructed differently by Provider!  Ozempic/ Mounjaro/ Wegovy/ Trulicity/ Semaglutide injections or weight loss medication to be stopped 7 days  prior to surgery.    !!!STOP ALL Aspirins, NSAIDS, WEIGHT LOSS INJECTIONS/PILLS, Herbal supplements, & Vitamins 7 DAYS BEFORE SURGERY!!!    ____  Avoid Alcoholic beverages 3 days prior to surgery, as it can thin the blood.  ____  NO Acrylic/fake nails or nail polish worn day of surgery (specifically hand/arm & foot surgeries).  ____  NO powder, lotions, deodorants, oils or cream on body.  ____  Remove all jewelry & piercings & foreign objects before arrival & leave at home.  ____  Remove Dentures, Hearing Aids & Contact Lens prior to surgery.  ____  Bring photo ID and insurance information to hospital (Leave Valuables at Home).  ____  If going home the same day, arrange for a ride home. You will not be able to drive for 24 hrs if Anesthesia was used.   ____  Females (ages 11-60): may need to give a urine sample the morning of surgery; please see Pre op Nurse prior to using the restroom.  ____  Males: Stop ED medications (Viagra, Cialis) 24 hrs prior to surgery.  ____  Wear clean, loose fitting clothing to allow for dressings/ bandages.      Bathing Instructions:    -Shower with anti-bacterial Soap (Hibiclens or Dial) the night before surgery and the morning of.   -Do not use Hibiclens on your face or genitals.   -Apply clean clothes after shower.  -Do not shave your face or body 2 days prior to surgery unless instructed otherwise by your Surgeon.  -Do not shave pubic hair 7 days prior to surgery (gyn pt's).    [facility]/Ride Policy:  Only 2 adults allowed in pre op/recovery area during your procedure. You MUST HAVE A RIDE HOME from a responsible adult that you know and trust. Medical Transport, Uber or Lyft can ONLY be used if patient has a responsible adult to accompany them during ride home.       *Signs and symptoms of Infection Before or After Surgery:               !!!If you experience any fever, chills, nausea/ vomiting, foul odor/ excessive drainage from surgical site, flu-like symptoms, new wounds or  cuts, PLEASE CALL THE SURGEON OFFICE at [phone] or SEND MESSAGE THROUGH [employer] PORTAL!!!     *If you are running late the morning of surgery, please call the Hospital Surgery Dept @ [phone].     *Billing questions:  [phone]     Thank you,  -[facility] Pre Admit Dept.  [phone] or [phone]  M-F 7:30 am-4:00 pm (Closed Major Holidays)  .

## 2025-02-20 NOTE — TELEPHONE ENCOUNTER
Received message from preadmit checking on the status of pts cardiac clearance.  I faxed a request for cardiac clearance to Dr. Cayden Beaulieu; awaiting response.

## 2025-02-21 ENCOUNTER — ANESTHESIA EVENT (OUTPATIENT)
Dept: SURGERY | Facility: HOSPITAL | Age: 55
End: 2025-02-21
Payer: COMMERCIAL

## 2025-02-21 ENCOUNTER — ANESTHESIA (OUTPATIENT)
Dept: SURGERY | Facility: HOSPITAL | Age: 55
End: 2025-02-21
Payer: COMMERCIAL

## 2025-02-21 ENCOUNTER — HOSPITAL ENCOUNTER (OUTPATIENT)
Facility: HOSPITAL | Age: 55
Discharge: HOME | End: 2025-02-21
Attending: UROLOGY | Admitting: UROLOGY
Payer: COMMERCIAL

## 2025-02-21 VITALS
BODY MASS INDEX: 30.51 KG/M2 | RESPIRATION RATE: 14 BRPM | SYSTOLIC BLOOD PRESSURE: 164 MMHG | HEART RATE: 66 BPM | DIASTOLIC BLOOD PRESSURE: 70 MMHG | OXYGEN SATURATION: 96 % | WEIGHT: 165.81 LBS | TEMPERATURE: 98 F | HEIGHT: 62 IN

## 2025-02-21 DIAGNOSIS — N20.1 LEFT URETERAL CALCULUS: ICD-10-CM

## 2025-02-21 PROCEDURE — 36000705 HC OR TIME LEV I EA ADD 15 MIN: Performed by: UROLOGY

## 2025-02-21 PROCEDURE — 50590 FRAGMENTING OF KIDNEY STONE: CPT | Mod: LT,,, | Performed by: UROLOGY

## 2025-02-21 PROCEDURE — 63600175 PHARM REV CODE 636 W HCPCS: Mod: JZ,TB | Performed by: STUDENT IN AN ORGANIZED HEALTH CARE EDUCATION/TRAINING PROGRAM

## 2025-02-21 PROCEDURE — 71000039 HC RECOVERY, EACH ADD'L HOUR: Performed by: UROLOGY

## 2025-02-21 PROCEDURE — 37000009 HC ANESTHESIA EA ADD 15 MINS: Performed by: UROLOGY

## 2025-02-21 PROCEDURE — 63600175 PHARM REV CODE 636 W HCPCS: Performed by: NURSE ANESTHETIST, CERTIFIED REGISTERED

## 2025-02-21 PROCEDURE — 71000033 HC RECOVERY, INTIAL HOUR: Performed by: UROLOGY

## 2025-02-21 PROCEDURE — 25000003 PHARM REV CODE 250: Performed by: STUDENT IN AN ORGANIZED HEALTH CARE EDUCATION/TRAINING PROGRAM

## 2025-02-21 PROCEDURE — 37000008 HC ANESTHESIA 1ST 15 MINUTES: Performed by: UROLOGY

## 2025-02-21 PROCEDURE — 71000015 HC POSTOP RECOV 1ST HR: Performed by: UROLOGY

## 2025-02-21 PROCEDURE — 36000704 HC OR TIME LEV I 1ST 15 MIN: Performed by: UROLOGY

## 2025-02-21 PROCEDURE — 63600175 PHARM REV CODE 636 W HCPCS: Performed by: UROLOGY

## 2025-02-21 RX ORDER — HYDROMORPHONE HYDROCHLORIDE 2 MG/ML
0.2 INJECTION, SOLUTION INTRAMUSCULAR; INTRAVENOUS; SUBCUTANEOUS EVERY 5 MIN PRN
Status: DISCONTINUED | OUTPATIENT
Start: 2025-02-21 | End: 2025-02-21 | Stop reason: HOSPADM

## 2025-02-21 RX ORDER — ONDANSETRON HYDROCHLORIDE 2 MG/ML
4 INJECTION, SOLUTION INTRAVENOUS DAILY PRN
Status: DISCONTINUED | OUTPATIENT
Start: 2025-02-21 | End: 2025-02-21 | Stop reason: HOSPADM

## 2025-02-21 RX ORDER — ONDANSETRON HYDROCHLORIDE 2 MG/ML
INJECTION, SOLUTION INTRAVENOUS
Status: DISCONTINUED | OUTPATIENT
Start: 2025-02-21 | End: 2025-02-21

## 2025-02-21 RX ORDER — OXYCODONE AND ACETAMINOPHEN 5; 325 MG/1; MG/1
1 TABLET ORAL
Status: DISCONTINUED | OUTPATIENT
Start: 2025-02-21 | End: 2025-02-21 | Stop reason: HOSPADM

## 2025-02-21 RX ORDER — MIDAZOLAM HYDROCHLORIDE 1 MG/ML
INJECTION INTRAMUSCULAR; INTRAVENOUS
Status: DISCONTINUED | OUTPATIENT
Start: 2025-02-21 | End: 2025-02-21

## 2025-02-21 RX ORDER — CIPROFLOXACIN 500 MG/1
500 TABLET ORAL EVERY 12 HOURS
Qty: 6 TABLET | Refills: 0 | Status: SHIPPED | OUTPATIENT
Start: 2025-02-21

## 2025-02-21 RX ORDER — CEFTRIAXONE 1 G/1
1 INJECTION, POWDER, FOR SOLUTION INTRAMUSCULAR; INTRAVENOUS
Status: COMPLETED | OUTPATIENT
Start: 2025-02-21 | End: 2025-02-21

## 2025-02-21 RX ORDER — HYDROCODONE BITARTRATE AND ACETAMINOPHEN 7.5; 325 MG/1; MG/1
1 TABLET ORAL EVERY 6 HOURS PRN
Qty: 12 TABLET | Refills: 0 | Status: SHIPPED | OUTPATIENT
Start: 2025-02-21

## 2025-02-21 RX ORDER — DEXAMETHASONE SODIUM PHOSPHATE 4 MG/ML
INJECTION, SOLUTION INTRA-ARTICULAR; INTRALESIONAL; INTRAMUSCULAR; INTRAVENOUS; SOFT TISSUE
Status: DISCONTINUED | OUTPATIENT
Start: 2025-02-21 | End: 2025-02-21

## 2025-02-21 RX ORDER — FENTANYL CITRATE 50 UG/ML
INJECTION, SOLUTION INTRAMUSCULAR; INTRAVENOUS
Status: DISCONTINUED | OUTPATIENT
Start: 2025-02-21 | End: 2025-02-21

## 2025-02-21 RX ORDER — PHENYLEPHRINE HYDROCHLORIDE 10 MG/ML
INJECTION INTRAVENOUS
Status: DISCONTINUED | OUTPATIENT
Start: 2025-02-21 | End: 2025-02-21

## 2025-02-21 RX ORDER — HYDROCODONE BITARTRATE AND ACETAMINOPHEN 5; 325 MG/1; MG/1
1 TABLET ORAL EVERY 4 HOURS PRN
Status: DISCONTINUED | OUTPATIENT
Start: 2025-02-21 | End: 2025-02-21 | Stop reason: HOSPADM

## 2025-02-21 RX ORDER — GLUCAGON 1 MG
1 KIT INJECTION
Status: DISCONTINUED | OUTPATIENT
Start: 2025-02-21 | End: 2025-02-21 | Stop reason: HOSPADM

## 2025-02-21 RX ORDER — DIPHENHYDRAMINE HYDROCHLORIDE 50 MG/ML
12.5 INJECTION INTRAMUSCULAR; INTRAVENOUS
Status: DISCONTINUED | OUTPATIENT
Start: 2025-02-21 | End: 2025-02-21 | Stop reason: HOSPADM

## 2025-02-21 RX ORDER — LIDOCAINE HYDROCHLORIDE 20 MG/ML
INJECTION INTRAVENOUS
Status: DISCONTINUED | OUTPATIENT
Start: 2025-02-21 | End: 2025-02-21

## 2025-02-21 RX ORDER — PROPOFOL 10 MG/ML
VIAL (ML) INTRAVENOUS
Status: DISCONTINUED | OUTPATIENT
Start: 2025-02-21 | End: 2025-02-21

## 2025-02-21 RX ADMIN — SODIUM CHLORIDE, POTASSIUM CHLORIDE, SODIUM LACTATE AND CALCIUM CHLORIDE: 600; 310; 30; 20 INJECTION, SOLUTION INTRAVENOUS at 02:02

## 2025-02-21 RX ADMIN — FENTANYL CITRATE 25 MCG: 50 INJECTION, SOLUTION INTRAMUSCULAR; INTRAVENOUS at 03:02

## 2025-02-21 RX ADMIN — CEFTRIAXONE SODIUM 1 G: 1 INJECTION, POWDER, FOR SOLUTION INTRAMUSCULAR; INTRAVENOUS at 03:02

## 2025-02-21 RX ADMIN — FENTANYL CITRATE 50 MCG: 50 INJECTION, SOLUTION INTRAMUSCULAR; INTRAVENOUS at 03:02

## 2025-02-21 RX ADMIN — ONDANSETRON 4 MG: 2 INJECTION INTRAMUSCULAR; INTRAVENOUS at 03:02

## 2025-02-21 RX ADMIN — PHENYLEPHRINE HYDROCHLORIDE 300 MCG: 10 INJECTION INTRAVENOUS at 03:02

## 2025-02-21 RX ADMIN — HYDROMORPHONE HYDROCHLORIDE 0.2 MG: 2 INJECTION INTRAMUSCULAR; INTRAVENOUS; SUBCUTANEOUS at 04:02

## 2025-02-21 RX ADMIN — DEXAMETHASONE SODIUM PHOSPHATE 4 MG: 4 INJECTION, SOLUTION INTRA-ARTICULAR; INTRALESIONAL; INTRAMUSCULAR; INTRAVENOUS; SOFT TISSUE at 03:02

## 2025-02-21 RX ADMIN — PHENYLEPHRINE HYDROCHLORIDE 200 MCG: 10 INJECTION INTRAVENOUS at 03:02

## 2025-02-21 RX ADMIN — OXYCODONE HYDROCHLORIDE AND ACETAMINOPHEN 1 TABLET: 5; 325 TABLET ORAL at 04:02

## 2025-02-21 RX ADMIN — PROPOFOL 150 MG: 10 INJECTION, EMULSION INTRAVENOUS at 02:02

## 2025-02-21 RX ADMIN — LIDOCAINE HYDROCHLORIDE 100 MG: 20 INJECTION INTRAVENOUS at 02:02

## 2025-02-21 RX ADMIN — MIDAZOLAM HYDROCHLORIDE 2 MG: 1 INJECTION, SOLUTION INTRAMUSCULAR; INTRAVENOUS at 02:02

## 2025-02-21 NOTE — ANESTHESIA PREPROCEDURE EVALUATION
2025  Altagracia Islas is a 54 y.o., female.  Past Medical History:   Diagnosis Date    Heart attack     Hx of heart artery stent     Migraine     Skin abnormalities      Past Surgical History:   Procedure Laterality Date    BREAST SURGERY       SECTION      COLONOSCOPY N/A 2024    Procedure: COLONOSCOPY;  Surgeon: Mona Santiago MD;  Location: [facility];  Service: Gastroenterology;  Laterality: N/A;    HYSTERECTOMY      paritial    TOTAL REDUCTION MAMMOPLASTY       Current Facility-Administered Medications:     cefTRIAXone injection 1 g, 1 g, Intravenous, On Call Procedure, Chava Giordano MD     *Seen and cleared by her cardiologist (Dr. Beaulieu) at Low Risk for General anesthesia on 25: clearance note in media     *EKG Sinus Cb (54)      Chemistry        Component Value Date/Time     2022 1236    K 3.7 2022 1236     2022 1236    CO2 25 2022 1236    BUN 7 2022 1236    CREATININE 0.7 2022 1236    GLU 73 2022 1236        Component Value Date/Time    CALCIUM 8.8 2022 1236    ALKPHOS 68 2022 1236    AST 16 2022 1236    ALT 17 2022 1236    BILITOT 0.6 2022 1236        Lab Results   Component Value Date    WBC 7.87 2024    HGB 13.6 2024    HCT 42.5 2024    MCV 93 2024     2024         Pre-op Assessment    I have reviewed the Patient Summary Reports.     I have reviewed the Nursing Notes. I have reviewed the NPO Status.   I have reviewed the Medications.     Review of Systems  Anesthesia Hx:  No problems with previous Anesthesia   History of prior surgery of interest to airway management or planning:  Previous anesthesia: General, MAC        Denies Family Hx of Anesthesia complications.    Denies Personal Hx of Anesthesia complications.                     Social:  Non-Smoker, Social Alcohol Use       Cardiovascular:       Past MI                                           Renal/:   renal calculi  Left nephrolithiasis             Neurological:    Neuromuscular Disease,  Headaches                                 Endocrine:        Metabolic Disorders, Obesity / BMI > 30      Physical Exam  General: Well nourished, Cooperative, Alert and Oriented    Airway:  Mallampati: II   Mouth Opening: Normal  TM Distance: Normal  Tongue: Normal  Neck ROM: Normal ROM    Dental:  Intact  Patient denies any currently loose or chipped teeth; Patient denies any removable dental appliances        Anesthesia Plan  Type of Anesthesia, risks & benefits discussed:    Anesthesia Type: Gen Supraglottic Airway, Gen ETT  Intra-op Monitoring Plan: Standard ASA Monitors  Post Op Pain Control Plan: multimodal analgesia and IV/PO Opioids PRN  Induction:  IV  Airway Plan: Direct, Post-Induction  Informed Consent: Informed consent signed with the Patient and all parties understand the risks and agree with anesthesia plan.  All questions answered. Patient consented to blood products? No  ASA Score: 2  Day of Surgery Review of History & Physical: H&P Update referred to the surgeon/provider.    Ready For Surgery From Anesthesia Perspective.     .

## 2025-02-21 NOTE — OP NOTE
Date of Procedure: 02/21/2025    PREOPERATIVE DIAGNOSIS:  left ureteral stone.    POSTOPERATIVE DIAGNOSIS:  Same.    PROCEDURES:  left ureter extracorporeal shock wave lithotripsy.    SURGEON:  Chava Giordano M.D.    Assistants: None    Specimen: None    ANESTHESIA:  LMA.    FINDINGS:  left 5 mm distal ureteral calculus    PROCEDURE IN DETAIL:  After informed consent and preoperative antibiotics, patient was brought to the operative suite placed in supine position.  Anesthesia was achieved.  Stone was readily localized with multiplanar fluoroscopy.  A left distal  ureteral stone approximately 5 mm was targeted from a anterior  approach.  Total of 3000 shocks were delivered with a maximum energy at a slow rate.  Intermittent fluoroscopy revealed good fragmentation and good targeting.  The end of the procedure the baffle was deflated skin was inspected.  Patient was awakened taken to recovery room in stable condition.  Findings discussed with the patient's family.    BLOOD LOSS:  None.    COMPLICATIONS:  None.

## 2025-02-21 NOTE — ANESTHESIA PROCEDURE NOTES
Intubation    Date/Time: 2/21/2025 3:00 PM    Performed by: Murali Nath CRNA  Authorized by: Loc Harvey MD    Intubation:     Induction:  Intravenous    Intubated:  Postinduction    Mask Ventilation:  Not attempted    Attempts:  1    Attempted By:  CRNA    Difficult Airway Encountered?: No      Complications:  None    Airway Device:  Supraglottic airway/LMA (igel)    Airway Device Size:  4.0    Secured at:  The lips    Placement Verified By:  Capnometry    Complicating Factors:  None    Findings Post-Intubation:  BS equal bilateral and atraumatic/condition of teeth unchanged

## 2025-02-21 NOTE — INTERVAL H&P NOTE
The patient has been examined and the H&P has been reviewed:    I concur with the findings and no changes have occurred since H&P was written.    Surgery risks, benefits and alternative options discussed and understood by patient/family.          Active Hospital Problems    Diagnosis  POA    Left ureteral calculus [N20.1]  Yes      Resolved Hospital Problems   No resolved problems to display.

## 2025-02-21 NOTE — TRANSFER OF CARE
"Anesthesia Transfer of Care Note    Patient: Altagracia Islas    Procedure(s) Performed: Procedure(s) (LRB):  LITHOTRIPSY, ESWL (Left)    Patient location: PACU    Anesthesia Type: general    Transport from OR: Transported from OR on room air with adequate spontaneous ventilation    Post pain: adequate analgesia    Post assessment: no apparent anesthetic complications and tolerated procedure well    Post vital signs: stable    Level of consciousness: awake    Nausea/Vomiting: no nausea/vomiting    Complications: none    Transfer of care protocol was followed    Last vitals: Visit Vitals  BP (!) 164/70 (BP Location: Right arm, Patient Position: Sitting)   Pulse (!) 54   Temp 36.6 °C (97.9 °F) (Temporal)   Resp 18   Ht 5' 2" (1.575 m)   Wt 75.2 kg (165 lb 12.6 oz)   SpO2 100%   Breastfeeding No   BMI 30.32 kg/m²     "

## 2025-02-21 NOTE — DISCHARGE SUMMARY
[facility] - Surgery (Hospital)  Discharge Note  Short Stay    Procedure(s) (LRB):  LITHOTRIPSY, ESWL (Left)      OUTCOME: Patient tolerated treatment/procedure well without complication and is now ready for discharge.    DISPOSITION: Home or Self Care    FINAL DIAGNOSIS:  left ureteral stone    FOLLOWUP: In clinic    DISCHARGE INSTRUCTIONS:    Discharge Procedure Orders   X-Ray Abdomen AP 1 View   Standing Status: Future Standing Exp. Date: 02/21/26     Order Specific Question Answer Comments   May the Radiologist modify the order per protocol to meet the clinical needs of the patient? Yes    Release to patient Immediate      Diet general     Call MD for:  temperature >100.4     No dressing needed   Order Comments: Drink plenty of fluids, strain urine for 3 days, will have blood in urine     Activity as tolerated        TIME SPENT ON DISCHARGE: 5 minutes

## 2025-02-22 LAB — BACTERIA UR CULT: NORMAL

## 2025-02-22 NOTE — ANESTHESIA POSTPROCEDURE EVALUATION
Anesthesia Post Evaluation    Patient: Altagracia Islas    Procedure(s) Performed: Procedure(s) (LRB):  LITHOTRIPSY, ESWL (Left)    Final Anesthesia Type: general      Patient location during evaluation: PACU  Patient participation: Yes- Able to Participate  Level of consciousness: awake and alert and oriented  Post-procedure vital signs: reviewed and stable  Pain management: adequate  Airway patency: patent  LUIS mitigation strategies: Verification of full reversal of neuromuscular block  PONV status at discharge: No PONV  Anesthetic complications: no      Cardiovascular status: blood pressure returned to baseline and hemodynamically stable  Respiratory status: unassisted  Hydration status: euvolemic  Follow-up not needed.              Vitals Value Taken Time   /74 02/21/25 17:03   Temp 36.7 °C (98 °F) 02/21/25 17:00   Pulse 66 02/21/25 17:10   Resp 16 02/21/25 17:07   SpO2 94 % 02/21/25 17:06   Vitals shown include unfiled device data.      Event Time   Out of Recovery 17:08:46         Pain/Vangie Score: Pain Rating Prior to Med Admin: 5 (2/21/2025  4:55 PM)  Vangie Score: 10 (2/21/2025  5:10 PM)

## 2025-02-24 ENCOUNTER — TELEPHONE (OUTPATIENT)
Dept: UROLOGY | Facility: CLINIC | Age: 55
End: 2025-02-24
Payer: COMMERCIAL

## 2025-02-24 NOTE — TELEPHONE ENCOUNTER
----- Message from Chava Giordano MD sent at 2/21/2025  3:38 PM CST -----  Regarding: f/u  2 weeks with Akilah

## 2025-03-13 ENCOUNTER — OFFICE VISIT (OUTPATIENT)
Dept: UROLOGY | Facility: CLINIC | Age: 55
End: 2025-03-13
Payer: COMMERCIAL

## 2025-03-13 ENCOUNTER — HOSPITAL ENCOUNTER (OUTPATIENT)
Dept: RADIOLOGY | Facility: HOSPITAL | Age: 55
Discharge: HOME | End: 2025-03-13
Attending: UROLOGY
Payer: COMMERCIAL

## 2025-03-13 VITALS
BODY MASS INDEX: 30.44 KG/M2 | DIASTOLIC BLOOD PRESSURE: 83 MMHG | WEIGHT: 165.38 LBS | HEART RATE: 71 BPM | HEIGHT: 62 IN | SYSTOLIC BLOOD PRESSURE: 150 MMHG

## 2025-03-13 DIAGNOSIS — N20.1 LEFT URETERAL CALCULUS: ICD-10-CM

## 2025-03-13 DIAGNOSIS — N20.0 LEFT NEPHROLITHIASIS: Primary | ICD-10-CM

## 2025-03-13 LAB
BILIRUB UR QL STRIP: NEGATIVE
GLUCOSE UR QL STRIP: NEGATIVE
KETONES UR QL STRIP: NEGATIVE
LEUKOCYTE ESTERASE UR QL STRIP: NEGATIVE
PH, POC UA: 6
POC BLOOD, URINE: NEGATIVE
POC NITRATES, URINE: NEGATIVE
PROT UR QL STRIP: NEGATIVE
SP GR UR STRIP: 1.02 (ref 1–1.03)
SPECIMEN SOURCE: NORMAL
UROBILINOGEN UR STRIP-ACNC: 1 (ref 0.1–1.1)

## 2025-03-13 PROCEDURE — 1159F MED LIST DOCD IN RCRD: CPT | Mod: CPTII,S$GLB,, | Performed by: UROLOGY

## 2025-03-13 PROCEDURE — 81003 URINALYSIS AUTO W/O SCOPE: CPT | Mod: QW,S$GLB,, | Performed by: UROLOGY

## 2025-03-13 PROCEDURE — 3079F DIAST BP 80-89 MM HG: CPT | Mod: CPTII,S$GLB,, | Performed by: UROLOGY

## 2025-03-13 PROCEDURE — 74018 RADEX ABDOMEN 1 VIEW: CPT | Mod: 26,,, | Performed by: RADIOLOGY

## 2025-03-13 PROCEDURE — 99999 PR PBB SHADOW E&M-EST. PATIENT-LVL IV: CPT | Mod: PBBFAC,,, | Performed by: UROLOGY

## 2025-03-13 PROCEDURE — 3077F SYST BP >= 140 MM HG: CPT | Mod: CPTII,S$GLB,, | Performed by: UROLOGY

## 2025-03-13 PROCEDURE — 99024 POSTOP FOLLOW-UP VISIT: CPT | Mod: S$GLB,,, | Performed by: UROLOGY

## 2025-03-13 PROCEDURE — 1160F RVW MEDS BY RX/DR IN RCRD: CPT | Mod: CPTII,S$GLB,, | Performed by: UROLOGY

## 2025-03-13 PROCEDURE — 82365 CALCULUS SPECTROSCOPY: CPT | Performed by: UROLOGY

## 2025-03-13 PROCEDURE — 74018 RADEX ABDOMEN 1 VIEW: CPT | Mod: TC

## 2025-03-13 NOTE — PROGRESS NOTES
"Chief Complaint:   Encounter Diagnoses   Name Primary?    Left nephrolithiasis Yes    Left ureteral calculus        HPI:  HPI Altagracia Islas anni 54 y.o. female who presents with follow up from left ureter ESWL.  She had a 4-5 mm left distal ureteral stone.  She had shockwave treatment.  Postoperatively passed stones within the 1st few days.  She brings in fragments today.  She has had no further flank pain.  She does have bilateral renal stones on her CT scan.  This was her 1st stone.    History:  Social History[1]  Past Medical History:   Diagnosis Date    Heart attack     Hx of heart artery stent     Migraine     Skin abnormalities      Past Surgical History:   Procedure Laterality Date    BREAST SURGERY       SECTION      COLONOSCOPY N/A 2024    Procedure: COLONOSCOPY;  Surgeon: Mona Santiago MD;  Location: [facility];  Service: Gastroenterology;  Laterality: N/A;    EXTRACORPOREAL SHOCK WAVE LITHOTRIPSY Left 2025    Procedure: LITHOTRIPSY, ESWL;  Surgeon: Chava Giordano MD;  [facility]: [facility] OR;  Service: Urology;  Laterality: Left;    HYSTERECTOMY      paritial    TOTAL REDUCTION MAMMOPLASTY       Family History   Problem Relation Name Age of Onset    Breast cancer Maternal Grandmother      Breast cancer Other mat great aunt        Medications Ordered Prior to Encounter[2]     Objective:     Vitals:    25 0913   BP: (!) 150/83   Pulse: 71   Weight: 75 kg (165 lb 5.5 oz)   Height: 5' 2" (1.575 m)      BMI Readings from Last 1 Encounters:   25 30.24 kg/m²          Physical Exam    No acute distress alert and oriented   Respirations even unlabored   Abdomen is soft nontender   Urinalysis clear     KUB was independently reviewed and shows stable left lower pole 4 mm renal stone.  Resolution of left distal ureteral stone.    Lab Results   Component Value Date    CREATININE 0.7 2022      Assessment:       1. Left nephrolithiasis    2. Left ureteral calculus        Plan: "     1. Left nephrolithiasis    2. Left ureteral calculus       Orders Placed This Encounter    X-Ray Abdomen AP 1 View    Urinary Stone Analysis      Discussed stone prevention.  Plan repeat KUB in 6 months.  Patient is considering ESWL for left renal stone.       [1]   Social History  Tobacco Use    Smoking status: Never     Passive exposure: Never    Smokeless tobacco: Never   Substance Use Topics    Alcohol use: No    Drug use: No   [2]   Current Outpatient Medications on File Prior to Visit   Medication Sig Dispense Refill    aspirin (ECOTRIN) 81 MG EC tablet Take 81 mg by mouth.      ciprofloxacin HCl (CIPRO) 500 MG tablet Take 1 tablet (500 mg total) by mouth every 12 (twelve) hours. 6 tablet 0    cyclobenzaprine (FLEXERIL) 10 MG tablet Take 1 tablet (10 mg total) by mouth 3 (three) times daily. 10 tablet 0    gabapentin (NEURONTIN) 100 MG capsule Take 1 capsule (100 mg total) by mouth 2 (two) times daily. 60 capsule 11    HYDROcodone-acetaminophen (NORCO) 7.5-325 mg per tablet Take 1 tablet by mouth every 6 (six) hours as needed for Pain. 12 tablet 0    ketoconazole (NIZORAL) 2 % cream APPLY ON THE SKIN BID PRN.  USE ON THE FACE      LORazepam (ATIVAN) 0.5 MG tablet Take 1 tablet (0.5 mg total) by mouth nightly as needed for Anxiety. 20 tablet 0    propranolol (INDERAL) 20 MG tablet propranolol      triamcinolone acetonide 0.1% (KENALOG) 0.1 % ointment       ubrogepant (UBRELVY) 100 mg tablet Take 1 tablet (100 mg total) by mouth as needed for Migraine. If symptoms persist or return, may repeat dose after 2 hours. Maximum: 200 mg per 24 hours 10 tablet 0    zolpidem (AMBIEN) 10 mg Tab Take 1 tablet (10 mg total) by mouth nightly as needed (insomnia). 30 tablet 3    tamsulosin (FLOMAX) 0.4 mg Cap Take 1 capsule (0.4 mg total) by mouth once daily. 30 capsule 0     Current Facility-Administered Medications on File Prior to Visit   Medication Dose Route Frequency Provider Last Rate Last Admin     onabotulinumtoxina injection 200 Units  200 Units Intramuscular 1 time in Clinic/HOD Kale Jean-Baptiste MD

## 2025-04-20 DIAGNOSIS — G43.911 INTRACTABLE MIGRAINE WITH STATUS MIGRAINOSUS, UNSPECIFIED MIGRAINE TYPE: ICD-10-CM

## 2025-04-23 RX ORDER — UBROGEPANT 100 MG/1
TABLET ORAL
Qty: 9 TABLET | Refills: 12 | Status: SHIPPED | OUTPATIENT
Start: 2025-04-23

## 2025-04-30 ENCOUNTER — OFFICE VISIT (OUTPATIENT)
Dept: URGENT CARE | Facility: CLINIC | Age: 55
End: 2025-04-30
Payer: COMMERCIAL

## 2025-04-30 VITALS
BODY MASS INDEX: 29.44 KG/M2 | OXYGEN SATURATION: 100 % | HEIGHT: 62 IN | HEART RATE: 65 BPM | RESPIRATION RATE: 16 BRPM | DIASTOLIC BLOOD PRESSURE: 64 MMHG | SYSTOLIC BLOOD PRESSURE: 149 MMHG | WEIGHT: 160 LBS | TEMPERATURE: 98 F

## 2025-04-30 DIAGNOSIS — J02.9 SORE THROAT: ICD-10-CM

## 2025-04-30 DIAGNOSIS — R52 GENERALIZED BODY ACHES: ICD-10-CM

## 2025-04-30 DIAGNOSIS — R09.81 SINUS CONGESTION: ICD-10-CM

## 2025-04-30 DIAGNOSIS — J01.90 ACUTE NON-RECURRENT SINUSITIS, UNSPECIFIED LOCATION: Primary | ICD-10-CM

## 2025-04-30 LAB
CTP QC/QA: YES
MOLECULAR STREP A: NEGATIVE
POC MOLECULAR INFLUENZA A AGN: NEGATIVE
POC MOLECULAR INFLUENZA B AGN: NEGATIVE
SARS CORONAVIRUS 2 ANTIGEN: NEGATIVE

## 2025-04-30 PROCEDURE — 87811 SARS-COV-2 COVID19 W/OPTIC: CPT | Mod: QW,S$GLB,, | Performed by: PHYSICIAN ASSISTANT

## 2025-04-30 PROCEDURE — 87502 INFLUENZA DNA AMP PROBE: CPT | Mod: QW,S$GLB,, | Performed by: PHYSICIAN ASSISTANT

## 2025-04-30 PROCEDURE — 99214 OFFICE O/P EST MOD 30 MIN: CPT | Mod: S$GLB,,, | Performed by: PHYSICIAN ASSISTANT

## 2025-04-30 PROCEDURE — 87651 STREP A DNA AMP PROBE: CPT | Mod: QW,S$GLB,, | Performed by: PHYSICIAN ASSISTANT

## 2025-04-30 RX ORDER — METHYLPREDNISOLONE 4 MG/1
TABLET ORAL
Qty: 1 EACH | Refills: 0 | Status: SHIPPED | OUTPATIENT
Start: 2025-04-30

## 2025-04-30 NOTE — PATIENT INSTRUCTIONS
General Instructions for Upper Respiratory Infection (URI):    What is a URI?   An upper respiratory infection (URI), also known as the common cold, is an acute infection of the head and chest. Generally, it affects the nose, throat, airways, sinuses and/or ears. URIs are typically caused by a virus and are among the most common diagnoses in Urgent Care.   While COVID and Flu are viruses most commonly tested for in Urgent Care, there are many other viruses that can cause similar symptoms such as: Respiratory Syncytial virus (RSV), Rhinovirus, Adenovirus, Enterovirus, Brayan-Barr virus (EBV), human meta pneumovirus, Parvovirus B19 (Fifth's disease).     How long will it last?   Probably longer than youd like. Symptoms usually worsen during the first 3-5 days, followed by gradual improvement. Most URIs resolve within 10-14 days, even without treatment. People with URIs are most contagious during the first 2-3 days of symptoms and rarely after 1 week. However, a person can remain contagious for several days after symptoms subside.     Treatment   Antibiotics only work on bacterial infections, and will not treat a virus. Because URIs usually are caused by viruses, antibiotics are not an effective treatment.  If taken when not needed, antibiotics can be harmful and can expose you to unnecessary side effects such as allergic reaction (rash, anaphylaxis), yeast infection, nausea, vomiting, diarrhea, Clostridioides difficile (C. diff), immune dysregulation, longer illness and antibiotic resistance. Fortunately, there are many options that help alleviate symptoms when used as directed. The treatments below can help you feel better while your body's own defenses are fighting the virus.    Fever and/or Pain:    Use a pain reliever, such as acetaminophen (Tylenol) or Ibuprofen (Advil). Avoid NSAIDs (Ibuprofen, Aleve, Motrin, Aspirin) if you are pregnant, have advanced kidney disease or history of stomach ulcers/bleeding.      "Dosages listed below are recommended for the average size adult       Acetaminophen (Tylenol): 500mg-650mg every 4 hours, not to exceed 4000mg in 24 hours       Ibuprofen (Advil, Motrin): 400mg-600mg every 6 hours (take with food or eat at least 30 minutes before taking medication)    Nasal congestion, post nasal dripping, or sinus pressure:    Use an oral decongestant, such as pseudoephedrine or Mucinex D to reduce nasal and sinus congestion. Decongestants are available at pharmacies. Decongestants should not be used by individuals with certain medical conditions such as hypertension (high blood pressure) or any history of heart palpitations. If you DO have Hypertension or palpitations, it is safe to take Coricidin HBP for relief of sinus symptoms.   Nasal sprays, such as fluticasone (Flonase), can help relief sneezing, runny nose and nasal congestion, and may take up to one week of consistent use to manage symptoms.     Nasal rinsing with saline nasal spray or salt water (e.g., neti pot) can help relieve nasal dryness.    Use a warm mist humidifier or take a steamy shower to increase moisture in the air and soothe oral and nasal tissues that become irritated with dry air.    Non-drowsy antihistamines during the day, such as Zyrtec, Claritin, Allegra may help with runny nose, post nasal drip, and sneezing. Benadryl can be used at night as needed, unless you are already taking a "night-time" cold medication.   Vicks vapor rub may be helpful to use at night.    Zantac will help if there is reflux from the post nasal drip and helpful to take at night.    Sore throat:    Use a pain reliever, such as acetaminophen (Tylenol) or Ibuprofen (Advil).    Gargle with salt water (1/2 tsp of salt dissolved in 8 oz of warm water). Salt water can be used as often as you like.    Throat lozenges or throat sprays (Cepacol lozenges or Chloroseptic spray) may bring some relief by increasing saliva production and lubricating your " throat.    Drink plenty of fluids (e.g., water, diluted juice, tea) to soothe your throat and to stay well hydrated. Honey/lemon water or warm tea may be soothing.    Coughing:    Coughing often occurs during the later stages of a URI. It may be dry or produce phlegm or mucus.    Medications that contain dextromethorphan (helps to suppress a cough) and/or Guaifenesin (thins mucus and relieves chest congestion). Examples that include both are Robitussin DM, Mucinex DM, Delsym. Guaifenesin works best when you drink plenty of water.     Tea with honey, when taken regularly, can soothe a sore throat and help suppress a cough.    Cough drops   Vicks vapor rub and/or humidifier at night    Take care with medications    Be familiar with the individual ingredients in every medication you take, so you treat your symptoms appropriately.    Watch for ingredient overlap between products (e.g., acetaminophen, ibuprofen, pseudoephedrine). Dont accidentally take too much of any ingredient.    Dont take medications longer than recommended.    Follow any specific instructions given by your health care provider. This is especially important if you have an underlying health condition.    If you have questions or concerns, ask a pharmacist for assistance or consult with your health care provider. Note: generic medications contain the same active ingredients as name brands.     Strengthen your immune system   Make sure you eat well (e.g., a healthy, balanced variety of foods).    Avoid alcohol as it can directly suppress various immune responses.    Stay away from cigarettes, and second hand smoke.    Rest as much as possible and get plenty of sleep (at least 8 hours).     Cold-eeze (Zinc), Elderberry, Emergen-C, may help to reduce the duration of URI symptoms if taken early.    Reduce risk of spreading URI to others    URI infections are contagious; help reduce the spread.    Wash your hands frequently and/or use a hand ,  especially after touching your face or covering cough.    Cover your mouth when coughing or sneezing.    Avoid sharing items like cups and lip balm.     When to seek help    Sometimes upper respiratory infections become worse instead of better. Secondary bacterial infections or other complications can develop that require further treatment. Dont delay seeking medical evaluation if you experience any of the following symptoms:    A fever greater than 101°F for longer than 3 days.    Breathing problems like feeling short of breath, having pain or tightness in your chest, or wheezing (high pitched whistling sounds when you breath in)    A cough that is painful, worsening, or lasting longer than two weeks.    A bad sore throat that lasts longer than three days, or worsens.    Very swollen glands in your neck that arent going away    Pain in your face or teeth that is not improving after a few days of decongestant use    A headache that lasts longer than a few days or is very severe    A new rash    Persistent abdominal pain    Inability to tolerate oral fluids without vomiting   Severe weakness, dizziness, or passing out   Significant drowsiness or confusion     Please follow up with your primary care provider if your signs and symptoms have not resolved after 7-10 days or sooner if symptoms worsen.   If your condition worsens or fails to improve we recommend that you receive another evaluation at the emergency  room immediately or contact your primary medical clinic to discuss your concerns.   You must understand that you have received Urgent Care treatment only and that you may be released before all of  your medical problems are known or treated. You, the patient, are responsible to arrange for follow up care as instructed.    More information    Medicine Plus: nlm.nih.gov/medlineplus/ commoncold.html    Centers for Disease Control &Prevention: cdc.gov/getsmart/community/ materials-references/print-materials/  hcp/adult-tract-infection.pdf

## 2025-04-30 NOTE — PROGRESS NOTES
"Subjective:      Patient ID: Altagracia Islas is a 54 y.o. female.    Vitals:  height is 5' 2" (1.575 m) and weight is 72.6 kg (160 lb). Her oral temperature is 98.2 °F (36.8 °C). Her blood pressure is 149/64 (abnormal) and her pulse is 65. Her respiration is 16 and oxygen saturation is 100%.     Chief Complaint: Sinus Problem    Patient present for sore throat and sinus problem. Reports she started with fever on Sunday. Has not had fever since then. Reports nasal congestion, fatigue, muscle aches, right ear pain, sore throat and nausea.  Throat bothering her worst today. Pain equal on both sides. Administered Tylenol and Mucinex.  Believes her nausea could be due to using liquid Mucinex instead of pill form.     Sinus Problem  This is a new problem. The current episode started in the past 7 days (Sunday). The problem has been gradually worsening since onset. The maximum temperature recorded prior to her arrival was 100.4 - 100.9 F. The fever has been present for Less than 1 day. Her pain is at a severity of 4/10. The pain is mild. Associated symptoms include chills, congestion, coughing, diaphoresis, ear pain, a hoarse voice, neck pain, sinus pressure, sneezing and a sore throat. Pertinent negatives include no headaches, shortness of breath or swollen glands. Past treatments include oral decongestants and acetaminophen. The treatment provided no relief.       Constitution: Positive for chills, sweating, fatigue and fever.   HENT:  Positive for ear pain, congestion, sinus pressure and sore throat. Negative for ear discharge, hearing loss and trouble swallowing.    Neck: Positive for neck pain. Negative for neck stiffness.   Cardiovascular: Negative.    Respiratory:  Positive for cough. Negative for shortness of breath and wheezing.    Gastrointestinal:  Positive for nausea. Negative for abdominal pain, vomiting and diarrhea.   Musculoskeletal:  Positive for muscle ache.   Allergic/Immunologic: Positive for sneezing. "   Neurological:  Negative for headaches.      Objective:     Physical Exam   Constitutional: She appears well-developed.  Non-toxic appearance. She appears ill. No distress.   HENT:   Head: Normocephalic and atraumatic.   Ears:   Right Ear: Tympanic membrane, external ear and ear canal normal.   Left Ear: Tympanic membrane, external ear and ear canal normal.   Nose: Mucosal edema, rhinorrhea and congestion present. No purulent discharge. Right sinus exhibits no maxillary sinus tenderness. Left sinus exhibits no maxillary sinus tenderness.   Mouth/Throat: Uvula is midline and mucous membranes are normal. Mucous membranes are moist. Posterior oropharyngeal erythema present. No oropharyngeal exudate or tonsillar abscesses. Tonsils are 2+ on the right. Tonsils are 2+ on the left. No tonsillar exudate.   Mild edema to face; mostly noted on bilateral eyelids      Comments: Mild edema to face; mostly noted on bilateral eyelids  Eyes: Conjunctivae and EOM are normal. Right eye exhibits no discharge. Left eye exhibits no discharge. Extraocular movement intact periorbital hyperpigmentation   Neck: Neck supple.   Pulmonary/Chest: Effort normal and breath sounds normal.   Abdominal: Normal appearance.   Musculoskeletal: Normal range of motion.         General: Normal range of motion.   Lymphadenopathy:     She has cervical adenopathy.   Neurological: no focal deficit. She is alert. She displays no weakness. Gait normal.   Skin: Skin is warm, dry, not diaphoretic, not pale and no rash.   Psychiatric: Her behavior is normal.     Results for orders placed or performed in visit on 04/30/25   POCT Influenza A/B Molecular    Collection Time: 04/30/25 12:43 PM   Result Value Ref Range    POC Molecular Influenza A Ag Negative Negative    POC Molecular Influenza B Ag Negative Negative     Acceptable Yes    SARS Coronavirus 2 Antigen, POCT Manual Read    Collection Time: 04/30/25 12:44 PM   Result Value Ref Range    SARS  Coronavirus 2 Antigen Negative Negative, Presumptive Negative     Acceptable Yes    POCT Strep A, Molecular    Collection Time: 04/30/25 12:54 PM   Result Value Ref Range    Molecular Strep A, POC Negative Negative     Acceptable Yes          Assessment:     1. Acute non-recurrent sinusitis, unspecified location    2. Sinus congestion    3. Generalized body aches    4. Sore throat        Plan:       Acute non-recurrent sinusitis, unspecified location  -     methylPREDNISolone (MEDROL DOSEPACK) 4 mg tablet; use as directed  Dispense: 1 each; Refill: 0    Sinus congestion  -     SARS Coronavirus 2 Antigen, POCT Manual Read  -     POCT Influenza A/B Molecular  -     methylPREDNISolone (MEDROL DOSEPACK) 4 mg tablet; use as directed  Dispense: 1 each; Refill: 0    Generalized body aches  -     SARS Coronavirus 2 Antigen, POCT Manual Read  -     POCT Influenza A/B Molecular    Sore throat  -     POCT Strep A, Molecular          Medical Decision Making:   Differential Diagnosis:   Viral vs bacterial sinusitis, AOM, strep pharyngitis, season allergies  Clinical Tests:   Lab Tests: Ordered and Reviewed  Urgent Care Management:  POCT negative. Likely viral in nature given pt had fever w/onset of URI symptoms. Believe there is likely some allergy component as well. Continue Mucinex (can switch to pill form if liquid causing nausea). Add daily antihistamine. Pt does not wish to use nasal spray as she states it dries out her nose badly.  Medrol Dosepak as prescribed.  Patient advised to monitor blood pressure while taking oral steroids and can discontinue if any unwanted side effects or if blood pressure remains elevated.  Rest and drink plenty of fluids. Supportive care for sore throat (salt water gargles, lozenges, chloraseptic spray, cough drops, warm tea, etc.). Continue to monitor for fever. Tylenol or Ibuprofen prn for pain or fever. Close follow-up with PCP return to clinic if symptoms worsen  or fail to improve with treatment.

## 2025-05-07 NOTE — PROGRESS NOTES
Established Patient Interventional Pain Note     Referring Physician: Kale Jean-Baptiste MD    PCP: Vita Gardner MD    Chief Complaint:   Migraine HA       SUBJECTIVE:  Interval history 05/08/2025  Patient presents for Botox procedure. She reports at least 90% improvement in migraine headaches with Botox administration. She has only had 1 migraine HA since our last clinic visit. When pain is present, she reports pain has been particularly severe in the occipitalis and cervical paraspinous musculature.   Pain today is rated a 0/10.     Patient took PO Lorazepam  prior to our procedure today.     PROCEDURE:  Informed consent was obtained and the procedure was described to the patient, a timeout was performed prior to starting the procedure.    Surgeon: Kale Jean-Baptiste MD      Pre-Op Diagnosis: chronic intractable migraine    Post-Op Diagnosis:  Same    Procedure:  Botox injections for headache    EBL: None    Complications: None    Specimens: None    Description of Procedure:    The patient was placed in the sitting position and the areas over the planned injections were all prepped with alcohol.  botox was reconstituted in preservative free normal saline, 100 units was placed in 2 mLof saline on a 30-gauge needle.  5 Units each was placed into the following muscles:       Botox mapping:        - muscles:  5 units bilaterally, 10 units total  -Procerus muscle: 5 units  -Frontalis muscle:  4 sites, 5 units each, 20 units total  -Temporalis muscle: 5 units , 4 sites bilaterally, 20 units total  -Occipitalis muscle: 5 units, 3 sites bilaterally, 30 units total  -Trapezius muscles:  5 units, 3 sites bilaterally, 30 units total  -cervical paraspinous:  5 units, 2 sites bilaterally, 20 units total    Total: 135 Units  Waste: 65 Units    Patient tolerated the procedure well and bleeding was nil no Band-Aids were applied the patient was discharged in stable condition.      Interval history 02/06/2025    Of note  patient presented for left-sided nephrolithiasis to urgent Care 02/05/2025 was given, Norco and Flomax following CT renal stone study.     Patient presents for Botox procedure. She reports at least 90% improvement in migraine headaches with Botox administration.  She reports having a migraine this morning.  Since our last clinic visit she reports pain has been particularly severe in the occipitalis and cervical paraspinous musculature.   Pain today is rated a 6/10.  She is requesting a temporary refill for Ubrelvy until she sees Dr. Carbajal.    PROCEDURE:  Informed consent was obtained and the procedure was described to the patient, a timeout was performed prior to starting the procedure.    Surgeon: Kale Jean-Baptiste MD      Pre-Op Diagnosis: chronic intractable migraine    Post-Op Diagnosis:  Same    Procedure:  Botox injections for headache    EBL: None    Complications: None    Specimens: None    Description of Procedure:    The patient was placed in the sitting position and the areas over the planned injections were all prepped with alcohol.  botox was reconstituted in preservative free normal saline, 100 units was placed in 2 mLof saline on a 30-gauge needle.  5 Units each was placed into the following muscles:       Botox mapping:        - muscles:  5 units bilaterally, 10 units total  -Procerus muscle: 5 units  -Frontalis muscle:  4 sites, 5 units each, 20 units total  -Temporalis muscle: 5 units , 4 sites bilaterally, 20 units total  -Occipitalis muscle: 5 units, 3 sites bilaterally, 30 units total  -Trapezius muscles:  5 units, 3 sites bilaterally, 30 units total  -cervical paraspinous:  5 units, 2 sites bilaterally, 20 units total    Total: 135 Units  Waste: 65 Units    Patient tolerated the procedure well and bleeding was nil no Band-Aids were applied the patient was discharged in stable condition.        Interval Hx: 10/15/2024  Patient presents for Botox procedure. She reports at least 90%  improvement in migraine headaches with Botox administration.  Patient reports improvement in intensity and frequency of headaches exceeds 2-1/2 weeks' in duration.  Patient reports 2 weeks prior to her next treatment she can experience migraine headaches in the frontotemporal and occipital regions.  Her most severe pain is in the occipital territory.  Pain today is rated a 3/10.    PROCEDURE:  Informed consent was obtained and the procedure was described to the patient, a timeout was performed prior to starting the procedure.    Surgeon: Kale Jean-Baptiste MD      Pre-Op Diagnosis: chronic intractable migraine    Post-Op Diagnosis:  Same    Procedure:  Botox injections for headache    EBL: None    Complications: None    Specimens: None    Description of Procedure:    The patient was placed in the sitting position and the areas over the planned injections were all prepped with alcohol.  botox was reconstituted in preservative free normal saline, 100 units was placed in 2 mLof saline on a 30-gauge needle.  5 Units each was placed into the following muscles:       Botox mapping:        - muscles:  5 units bilaterally, 10 units total  -Procerus muscle: 5 units  -Frontalis muscle:  4 sites, 5 units each, 20 units total  -Temporalis muscle: 5 units , 4 sites bilaterally, 20 units total  -Occipitalis muscle: 5 units, 3 sites bilaterally, 30 units total  -Trapezius muscles:  5 units, 3 sites bilaterally, 30 units total  -cervical paraspinous:  5 units, 2 sites bilaterally, 20 units total    Total: 135 Units  Waste: 65 Units    Patient tolerated the procedure well and bleeding was nil no Band-Aids were applied the patient was discharged in stable condition.        Interval history 07/09/2024  Patient presents for MRI brain with and without contrast review and for Botox procedure:  Patient reports significant improvement in frequency, intensity of migraine headaches with initiation of Botox treatment.  Patient reports  80% improvement.  She reports headaches may return approximately after 2-1/2 months.  Even with recurrence, these headaches tend to be less intense with shorter durations.  She does report pain along primarily frontotemporal and cervical paraspinous musculature when headaches are present.  Patient has not needed to use Fioricet medication during this period of time.    Of note patient followed with Dr. Tilley 04/23/2024 with the following evaluation:      Patient has been taking Ambien as needed by Dr. Tilley to help with sleep hygiene.  She has inquiring about a refill of this medication.    PROCEDURE:  Informed consent was obtained and the procedure was described to the patient, a timeout was performed prior to starting the procedure.    Surgeon: Kale Jean-Baptiste MD      Pre-Op Diagnosis: chronic intractable migraine    Post-Op Diagnosis:  Same    Procedure:  Botox injections for headache    EBL: None    Complications: None    Specimens: None    Description of Procedure:    The patient was placed in the sitting position and the areas over the planned injections were all prepped with alcohol.  botox was reconstituted in preservative free normal saline, 100 units was placed in 2 mLof saline on a 30-gauge needle.  5 Units each was placed into the following muscles:       Botox mapping:        - muscles:  5 units bilaterally, 10 units total  -Procerus muscle: 5 units  -Frontalis muscle:  4 sites, 5 units each, 20 units total  -Temporalis muscle: 5 units , 4 sites bilaterally, 20 units total  -Occipitalis muscle: 5 units, 3 sites bilaterally, 30 units total  -Trapezius muscles:  5 units, 3 sites bilaterally, 30 units total  -cervical paraspinous:  5 units, 2 sites bilaterally, 20 units total    Total: 135 Units  Waste: 65 Units    Patient tolerated the procedure well and bleeding was nil no Band-Aids were applied the patient was discharged in stable condition.      Interval history 04/09/2024  Patient presents for  Botox procedure:  Patient reports significant improvement in frequency, intensity of migraine headaches with initiation of Botox treatment.  Patient reports 80% improvement.  She reports headaches may return approximately after 2-1/2 months.  Even with recurrence, these headaches tend to be less intense with shorter durations.  She does report pain along primarily frontotemporal and cervical paraspinous musculature when headaches are present.  She reports stress has been increased as her mother-in-law has been sick in [place].  Patient continues to work 2 jobs during this period as well.  She does report difficulty scheduling follow with Dr. Mckeon, with whom she discussed headaches and insomnia.     PROCEDURE:  Informed consent was obtained and the procedure was described to the patient, a timeout was performed prior to starting the procedure.    Surgeon: Kale Jean-Baptiste MD      Pre-Op Diagnosis: chronic intractable migraine    Post-Op Diagnosis:  Same    Procedure:  Botox injections for headache    EBL: None    Complications: None    Specimens: None    Description of Procedure:    The patient was placed in the sitting position and the areas over the planned injections were all prepped with alcohol.  botox was reconstituted in preservative free normal saline, 100 units was placed in 2 mLof saline on a 30-gauge needle.  5 Units each was placed into the following muscles:       Botox mapping:        - muscles:  5 units bilaterally, 10 units total  -Procerus muscle: 5 units  -Frontalis muscle:  4 sites, 5 units each, 20 units total  -Temporalis muscle: 5 units , 4 sites bilaterally, 20 units total  -Occipitalis muscle: 5 units, 3 sites bilaterally, 30 units total  -Trapezius muscles:  5 units, 3 sites bilaterally, 30 units total  -cervical paraspinous:  5 units, 2 sites bilaterally, 20 units total    Total: 135 Units    Patient tolerated the procedure well and bleeding was nil no Band-Aids were applied the  patient was discharged in stable condition.          Interval history 08/21/2023   Patient presents for Botox treatment. Patient reports diagnosis of migraines exceeding 20 years in duration.  Patient reports her migraines occur at least 2-3 days per week and lasts in between 3-5 days in duration.  She reports greater than 15 days per month with migraine headaches.  Pain is typically in the frontotemporal, retro-orbital and periauricular territories.  Patient reports when present headaches are typically unilateral but can vary from right to left.  Pain today is rated 0/10 but at its worse is a 10/10.  Pain is described as dull and throbbing and pounding in nature.  Headaches are compounded by diplopia, nausea, photophobia, phonophobia and neck stiffness.  Headaches can be brought on by weather or stress.  Pain is marginally improved with rest as well as taking propranolol 3 times daily.  Patient has failed to have improvement with Imitrex and Topamax in the past    PROCEDURE:  Informed consent was obtained and the procedure was described to the patient, a timeout was performed prior to starting the procedure.    Surgeon: Kale Jean-Baptiste MD      Pre-Op Diagnosis: chronic intractable migraine, atypical cluster headache    Post-Op Diagnosis:  Same    Procedure:  Botox injections for headache    EBL: None    Complications: None    Specimens: None    Description of Procedure:    The patient was placed in the sitting position and the areas over the planned injections were all prepped with alcohol.  botox was reconstituted in preservative free normal saline, 100 units was placed in 2 mLof saline on a 30-gauge needle.  5 Units each was placed into the following muscles:       Botox mapping:        - muscles:  5 units bilaterally, 10 units total  -Procerus muscle: 5 units  -Frontalis muscle:  4 sites, 5 units each, 20 units total  -Temporalis muscle: 5 units , 4 sites bilaterally, 20 units total  -Occipitalis muscle:  "5 units, 3 sites bilaterally, 30 units total  -Trapezius muscles:  5 units, 3 sites bilaterally, 30 units total  -cervical paraspinous:  5 units, 2 sites bilaterally, 20 units total    Total: 135 Units  Waste: 65 Units    Patient tolerated the procedure well and bleeding was nil no Band-Aids were applied the patient was discharged in stable condition.        HPI 07/24/2023  Altagracia Islas is a 54 y.o. female with past medical history significant for migraine HA, CAD s/p MI s/p PTCA who presents to the clinic for the evaluation of headaches.  Patient reports diagnosis of migraines exceeding 20 years in duration.  Patient reports her migraines occur at least 2-3 days per week and lasts in between 3-5 days in duration.  She reports greater than 15 days per month with migraine headaches.  Pain is typically in the frontotemporal, retro-orbital and periauricular territories.  Patient reports when present headaches are typically unilateral but can vary from right to left.  Pain today is rated 0/10 but at its worse is a 10/10.  Pain is described as dull and throbbing and pounding in nature.  Headaches are compounded by diplopia, nausea, photophobia, phonophobia and neck stiffness.  Headaches can be brought on by weather or stress.  Pain is marginally improved with rest as well as taking propranolol 3 times daily.  Patient has failed to have improvement with Imitrex and Topamax in the past.  Of note patient reports complex prior history of having trifecta of dizziness, migraines and chest pain with echo and stress test unremarkable.  Patient had a heart catheterization demonstrating a blood clot in the right coronary artery." Since that time patient reports she has been advised to avoid migraine medications and birth control, which may have been contributing to her symptoms as well.  Today she denies more distal radiculopathy into the upper extremities or hands, compromise in hand  strength or dexterity.  Of note " patient has not had a follow-up with Neurology since 2019 during her cardiac workup.  Patient has performed physician directed cervical traction and strengthening exercises over the last 8 weeks without meaningful improvement in her headaches or pain.    Patient denies night fever/night sweats, urinary incontinence, bowel incontinence, significant weight loss, significant motor weakness, and loss of sensations.    Pain Disability Index Review:         2025    10:50 AM 2025    12:36 PM 10/15/2024    12:09 PM   Last 3 PDI Scores   Pain Disability Index (PDI) 5 42 18       Non-Pharmacologic Treatments:  Physical Therapy/Home Exercise: yes  Ice/Heat:yes  TENS: no  Acupuncture: no  Massage: no  Chiropractic: no    Other: no      Pain Medications:  - Opioids: Percocet (Oxycodone/Acetaminophen)  - Adjuvant Medications: Cyclobenzaprine (Flexeril)  - Anti-Coagulants: Aspirin    Pain Procedures:   None    Past Medical History:   Diagnosis Date    Heart attack     Hx of heart artery stent     Migraine     Skin abnormalities      Past Surgical History:   Procedure Laterality Date    BREAST SURGERY       SECTION      COLONOSCOPY N/A 2024    Procedure: COLONOSCOPY;  Surgeon: Mona Santiago MD;  Location: [facility];  Service: Gastroenterology;  Laterality: N/A;    EXTRACORPOREAL SHOCK WAVE LITHOTRIPSY Left 2025    Procedure: LITHOTRIPSY, ESWL;  Surgeon: Chava Giordano MD;  Location: [facility] OR;  Service: Urology;  Laterality: Left;    HYSTERECTOMY      paritial    TOTAL REDUCTION MAMMOPLASTY       Review of patient's allergies indicates:   Allergen Reactions    Latex      Other reaction(s): Rash, facial       Current Outpatient Medications   Medication Sig    gabapentin (NEURONTIN) 100 MG capsule Take 1 capsule (100 mg total) by mouth 2 (two) times daily.    ketoconazole (NIZORAL) 2 % cream APPLY ON THE SKIN BID PRN.  USE ON THE FACE    methylPREDNISolone (MEDROL DOSEPACK) 4 mg tablet use as directed  "   propranolol (INDERAL) 20 MG tablet propranolol    triamcinolone acetonide 0.1% (KENALOG) 0.1 % ointment     ubrogepant (UBRELVY) 100 mg tablet TAKE 1 TABLET BY MOUTH AS NEEDED FOR MIGRAINE. IF SYMPTOMS PERSIST OR RETURN, MAY REPEAT DOSE AFTER 2 HOURS. MAXIMUM 200MG PER 24 HOURS    zolpidem (AMBIEN) 10 mg Tab Take 1 tablet (10 mg total) by mouth nightly as needed (insomnia).    aspirin (ECOTRIN) 81 MG EC tablet Take 81 mg by mouth. (Patient not taking: Reported on 5/8/2025)    LORazepam (ATIVAN) 0.5 MG tablet Take 1 tablet (0.5 mg total) by mouth nightly as needed for Anxiety.    tamsulosin (FLOMAX) 0.4 mg Cap Take 1 capsule (0.4 mg total) by mouth once daily.     Current Facility-Administered Medications   Medication    onabotulinumtoxina injection 200 Units       Review of Systems     GENERAL:  No weight loss, malaise or fevers.  HEENT:   No recent changes in vision or hearing  NECK:  Negative for lumps, no difficulty with swallowing.  RESPIRATORY:  Negative for cough, wheezing or shortness of breath, patient denies any recent URI.  CARDIOVASCULAR:  Negative for chest pain or palpitations.  GI:  Negative for abdominal discomfort, blood in stools or black stools or change in bowel habits.  MUSCULOSKELETAL:  See HPI.  SKIN:  Negative for lesions, rash, and itching.  PSYCH:  No mood disorder or recent psychosocial stressors.   HEMATOLOGY/LYMPHOLOGY:  Negative for prolonged bleeding, bruising easily or swollen nodes.    NEURO:   No history of syncope, paralysis, seizures or tremors.  All other reviewed and negative other than HPI.    OBJECTIVE:    BP (!) 165/74   Pulse (!) 58   Resp 17   Ht 5' 2" (1.575 m)   Wt 74 kg (163 lb 2.3 oz)   BMI 29.84 kg/m²       Physical Exam    GENERAL: Well appearing, in no acute distress, alert and oriented x3.  PSYCH:  Mood and affect appropriate.  SKIN: Skin color, texture, turgor normal, no rashes or lesions.  HEAD/FACE:  Normocephalic, atraumatic. Cranial nerves grossly " intact.    NECK: No pain to palpation over the cervical paraspinous muscles. Spurling Negative. No pain with neck flexion, extension, or lateral flexion.   Normaltesting biceps, triceps and brachioradialis bilaterally.    NegativeHoffmann's bilaterally.    5/5 strength testing deltoid, biceps, triceps, wrist extensor, wrist flexor and ulnar intrinsics bilaterally.    Normal  strength bilaterally    CV: RRR with palpation of the radial artery.  PULM: No evidence of respiratory difficulty, symmetric chest rise.  GI:  Soft and non-tender.    NEURO: Bilateral upper and lower extremity coordination and muscle stretch reflexes are physiologic and symmetric. No loss of sensation is noted.  GAIT: normal.    Imaging:   MRI brain with and without contrast 05/01/2024  FINDINGS:  Cerebral and ventricular volumes are within normal limits for the patient's age.  No evidence of hydrocephalus.     No evidence of acute territorial infarct, intracranial hemorrhage, or mass lesion.  No abnormal enhancement, restricted diffusion, or susceptibility artifact.  No significant midline shift or mass effect.     Midline structures and posterior fossa structures are unremarkable.  Basal cisterns are clear.  Major vascular flow voids are unremarkable.     Cranium is unremarkable.  Orbits and globes are within normal limits.  Paranasal sinuses and mastoid air cells are clear.     Impression:     Unremarkable MR appearance of the brain.  No acute findings or abnormal enhancement.    ASSESSMENT: 54 y.o. year old female with head pain, consistent with     1. Intractable migraine with status migrainosus, unspecified migraine type  onabotulinumtoxina injection 100 Units    onabotulinumtoxina injection 100 Units    Prior authorization Order      2. Myofascial pain          PLAN:   - Interventions: Schedule repeat botox in 3 months:     Botox Treatments:  -05/08/2025  -02/06/2025  -10/16/2024  -08/21/2023  -04/09/2024  -07/09/2024    Patient is a  candidate for Botox treatment secondary to greater than 15 migraine headaches per month, lasting 3-5 days in duration with failed response to agents including nonsteroidal anti-inflammatory medications, Imitrex, Topamax and Propranolol.  Patient notes 80% improvement in intensity, frequency of headaches with initiation of Botox treatments.    Pre-Op Diagnosis: chronic intractable migraine    Post-Op Diagnosis:  Same     Anticipated Botox mapping:    - muscles:  5 units bilaterally, 10 units total  -Procerus muscle: 5 units  -Frontalis muscle:  4 sites, 5 units each, 20 units total  -Temporalis muscle: 5 units , 4 sites bilaterally, 20 units total  -Occipitalis muscle: 5 units, 3 sites bilaterally, 30 units total  -Trapezius muscles:  5 units, 3 sites bilaterally, 30 units total  -cervical paraspinous:  5 units, 2 sites bilaterally, 20 units total    Total: 135 Units  Waste: 65 Units    - Anticoagulation use: Yes aspirin     report:  Reviewed and consistent with medication use as prescribed.    - Medications:  -Continue Propranolol per Ms. Harris, NP  -Continue Ambien and Ubrevly per Dr. Carbajal    - Therapy:   -We discussed continuing physician directed at home physical therapy to help manage the patient's painful condition. The patient was counseled that muscle strengthening will improve the long term prognosis in regards to pain and may also help increase range of motion and mobility. .    -Imaging:  Diagnostic imaging (MRI brain with and without contrast) reviewed and discussed with the patient    - Consults/Referrals: (PRN)  -Neurology: migraine [facility]; Dr. Santos Tilley    - Follow up visit: 3 mo extended with me. Botox.      The above plan and management options were discussed at length with patient. Patient is in agreement with the above and verbalized understanding.    - I discussed the goals of interventional chronic pain management with the patient on today's visit. We discussed a multimodal and  systematic approach to pain.  This includes diagnostic and therapeutic injections, adjuvant pharmacologic treatment, physical therapy, and at times psychiatry.  I emphasized the importance of regular exercise, core strengthening and stretching, diet and weight loss as a cornerstone of long-term pain management.    - This condition does not require this patient to take time off of work, and the primary goal of our Pain Management services is to improve the patient's functional capacity.  - Patient Questions: Answered all of the patient's questions regarding diagnoses, therapy, treatment and next steps        Kale Jean-Baptiste MD  Interventional Pain Management  [facility]    Disclaimer:  This note was prepared using voice recognition system and is likely to have sound alike errors that may have been overlooked even after proof reading.  Please call me with any questions

## 2025-05-08 ENCOUNTER — OFFICE VISIT (OUTPATIENT)
Dept: PAIN MEDICINE | Facility: CLINIC | Age: 55
End: 2025-05-08
Payer: COMMERCIAL

## 2025-05-08 VITALS
SYSTOLIC BLOOD PRESSURE: 165 MMHG | HEIGHT: 62 IN | WEIGHT: 163.13 LBS | BODY MASS INDEX: 30.02 KG/M2 | HEART RATE: 58 BPM | DIASTOLIC BLOOD PRESSURE: 74 MMHG | RESPIRATION RATE: 17 BRPM

## 2025-05-08 DIAGNOSIS — G43.911 INTRACTABLE MIGRAINE WITH STATUS MIGRAINOSUS, UNSPECIFIED MIGRAINE TYPE: Primary | ICD-10-CM

## 2025-05-08 DIAGNOSIS — M79.18 MYOFASCIAL PAIN: ICD-10-CM

## 2025-05-08 PROCEDURE — 99999 PR PBB SHADOW E&M-EST. PATIENT-LVL IV: CPT | Mod: PBBFAC,,, | Performed by: ANESTHESIOLOGY

## 2025-07-31 ENCOUNTER — TELEPHONE (OUTPATIENT)
Dept: PAIN MEDICINE | Facility: CLINIC | Age: 55
End: 2025-07-31
Payer: COMMERCIAL

## 2025-07-31 NOTE — TELEPHONE ENCOUNTER
Reached out to patient to reschedule appointment because the provider will be out of clinic.  Apt has been made . All questions answered.

## 2025-08-11 DIAGNOSIS — G43.911 INTRACTABLE MIGRAINE WITH STATUS MIGRAINOSUS, UNSPECIFIED MIGRAINE TYPE: Primary | ICD-10-CM

## 2025-08-11 DIAGNOSIS — F41.9 ANXIETY: ICD-10-CM

## 2025-08-11 RX ORDER — DIAZEPAM 5 MG/1
5-10 TABLET ORAL ONCE
Qty: 2 TABLET | Refills: 0 | Status: SHIPPED | OUTPATIENT
Start: 2025-08-11 | End: 2025-08-11

## 2025-08-12 ENCOUNTER — PATIENT MESSAGE (OUTPATIENT)
Dept: PAIN MEDICINE | Facility: CLINIC | Age: 55
End: 2025-08-12
Payer: COMMERCIAL

## 2025-08-14 ENCOUNTER — OFFICE VISIT (OUTPATIENT)
Dept: PAIN MEDICINE | Facility: CLINIC | Age: 55
End: 2025-08-14
Payer: COMMERCIAL

## 2025-08-14 VITALS
RESPIRATION RATE: 17 BRPM | SYSTOLIC BLOOD PRESSURE: 116 MMHG | HEART RATE: 58 BPM | WEIGHT: 161.63 LBS | BODY MASS INDEX: 29.74 KG/M2 | DIASTOLIC BLOOD PRESSURE: 70 MMHG | HEIGHT: 62 IN

## 2025-08-14 DIAGNOSIS — M79.18 MYOFASCIAL PAIN: ICD-10-CM

## 2025-08-14 DIAGNOSIS — G43.911 INTRACTABLE MIGRAINE WITH STATUS MIGRAINOSUS, UNSPECIFIED MIGRAINE TYPE: Primary | ICD-10-CM

## 2025-08-14 PROCEDURE — 99999 PR PBB SHADOW E&M-EST. PATIENT-LVL IV: CPT | Mod: PBBFAC,,, | Performed by: PHYSICIAN ASSISTANT

## 2025-08-16 DIAGNOSIS — G47.00 INSOMNIA, UNSPECIFIED TYPE: ICD-10-CM

## 2025-08-19 RX ORDER — ZOLPIDEM TARTRATE 10 MG/1
10 TABLET ORAL NIGHTLY
Qty: 30 TABLET | Refills: 0 | Status: SHIPPED | OUTPATIENT
Start: 2025-08-19

## (undated) DEVICE — SUPPORT ULNA NERVE PROTECTOR